# Patient Record
Sex: MALE | Race: WHITE | NOT HISPANIC OR LATINO | Employment: FULL TIME | URBAN - METROPOLITAN AREA
[De-identification: names, ages, dates, MRNs, and addresses within clinical notes are randomized per-mention and may not be internally consistent; named-entity substitution may affect disease eponyms.]

---

## 2017-06-14 ENCOUNTER — GENERIC CONVERSION - ENCOUNTER (OUTPATIENT)
Dept: OTHER | Facility: OTHER | Age: 57
End: 2017-06-14

## 2017-06-14 ENCOUNTER — ALLSCRIPTS OFFICE VISIT (OUTPATIENT)
Dept: OTHER | Facility: OTHER | Age: 57
End: 2017-06-14

## 2017-06-14 ENCOUNTER — TRANSCRIBE ORDERS (OUTPATIENT)
Dept: LAB | Facility: CLINIC | Age: 57
End: 2017-06-14

## 2017-06-14 ENCOUNTER — APPOINTMENT (OUTPATIENT)
Dept: LAB | Facility: CLINIC | Age: 57
End: 2017-06-14
Payer: COMMERCIAL

## 2017-06-14 DIAGNOSIS — I10 ESSENTIAL (PRIMARY) HYPERTENSION: ICD-10-CM

## 2017-06-14 DIAGNOSIS — R60.9 EDEMA: ICD-10-CM

## 2017-06-14 DIAGNOSIS — Z86.718 PERSONAL HISTORY OF OTHER VENOUS THROMBOSIS AND EMBOLISM: ICD-10-CM

## 2017-06-14 DIAGNOSIS — Z11.59 ENCOUNTER FOR SCREENING FOR OTHER VIRAL DISEASES: ICD-10-CM

## 2017-06-14 DIAGNOSIS — Z12.5 ENCOUNTER FOR SCREENING FOR MALIGNANT NEOPLASM OF PROSTATE: ICD-10-CM

## 2017-06-14 LAB
ALBUMIN SERPL BCP-MCNC: 3.8 G/DL (ref 3.5–5)
ALP SERPL-CCNC: 92 U/L (ref 46–116)
ALT SERPL W P-5'-P-CCNC: 54 U/L (ref 12–78)
ANION GAP SERPL CALCULATED.3IONS-SCNC: 6 MMOL/L (ref 4–13)
AST SERPL W P-5'-P-CCNC: 22 U/L (ref 5–45)
BILIRUB SERPL-MCNC: 0.55 MG/DL (ref 0.2–1)
BUN SERPL-MCNC: 15 MG/DL (ref 5–25)
CALCIUM SERPL-MCNC: 8.8 MG/DL (ref 8.3–10.1)
CHLORIDE SERPL-SCNC: 106 MMOL/L (ref 100–108)
CHOLEST SERPL-MCNC: 207 MG/DL (ref 50–200)
CO2 SERPL-SCNC: 29 MMOL/L (ref 21–32)
CREAT SERPL-MCNC: 0.78 MG/DL (ref 0.6–1.3)
ERYTHROCYTE [DISTWIDTH] IN BLOOD BY AUTOMATED COUNT: 14.2 % (ref 11.6–15.1)
GFR SERPL CREATININE-BSD FRML MDRD: >60 ML/MIN/1.73SQ M
GLUCOSE P FAST SERPL-MCNC: 93 MG/DL (ref 65–99)
HCT VFR BLD AUTO: 43.7 % (ref 36.5–49.3)
HDLC SERPL-MCNC: 44 MG/DL (ref 40–60)
HGB BLD-MCNC: 14.5 G/DL (ref 12–17)
LDLC SERPL CALC-MCNC: 136 MG/DL (ref 0–100)
MCH RBC QN AUTO: 28.3 PG (ref 26.8–34.3)
MCHC RBC AUTO-ENTMCNC: 33.2 G/DL (ref 31.4–37.4)
MCV RBC AUTO: 85 FL (ref 82–98)
PLATELET # BLD AUTO: 204 THOUSANDS/UL (ref 149–390)
PMV BLD AUTO: 10.6 FL (ref 8.9–12.7)
POTASSIUM SERPL-SCNC: 4 MMOL/L (ref 3.5–5.3)
PROT SERPL-MCNC: 7.2 G/DL (ref 6.4–8.2)
RBC # BLD AUTO: 5.12 MILLION/UL (ref 3.88–5.62)
SODIUM SERPL-SCNC: 141 MMOL/L (ref 136–145)
TRIGL SERPL-MCNC: 137 MG/DL
WBC # BLD AUTO: 7.3 THOUSAND/UL (ref 4.31–10.16)

## 2017-06-14 PROCEDURE — 80053 COMPREHEN METABOLIC PANEL: CPT

## 2017-06-14 PROCEDURE — 85027 COMPLETE CBC AUTOMATED: CPT

## 2017-06-14 PROCEDURE — 80061 LIPID PANEL: CPT

## 2017-06-14 PROCEDURE — 36415 COLL VENOUS BLD VENIPUNCTURE: CPT

## 2017-06-30 ENCOUNTER — ALLSCRIPTS OFFICE VISIT (OUTPATIENT)
Dept: OTHER | Facility: OTHER | Age: 57
End: 2017-06-30

## 2017-12-26 ENCOUNTER — APPOINTMENT (OUTPATIENT)
Dept: LAB | Facility: CLINIC | Age: 57
End: 2017-12-26
Payer: COMMERCIAL

## 2017-12-26 ENCOUNTER — TRANSCRIBE ORDERS (OUTPATIENT)
Dept: LAB | Facility: CLINIC | Age: 57
End: 2017-12-26

## 2017-12-26 ENCOUNTER — GENERIC CONVERSION - ENCOUNTER (OUTPATIENT)
Dept: OTHER | Facility: OTHER | Age: 57
End: 2017-12-26

## 2017-12-26 ENCOUNTER — ALLSCRIPTS OFFICE VISIT (OUTPATIENT)
Dept: OTHER | Facility: OTHER | Age: 57
End: 2017-12-26

## 2017-12-26 DIAGNOSIS — E78.5 HYPERLIPIDEMIA: ICD-10-CM

## 2017-12-26 DIAGNOSIS — Z12.5 ENCOUNTER FOR SCREENING FOR MALIGNANT NEOPLASM OF PROSTATE: ICD-10-CM

## 2017-12-26 DIAGNOSIS — Z11.59 ENCOUNTER FOR SCREENING FOR OTHER VIRAL DISEASES (CODE): ICD-10-CM

## 2017-12-26 DIAGNOSIS — I10 ESSENTIAL (PRIMARY) HYPERTENSION: ICD-10-CM

## 2017-12-26 LAB
ALBUMIN SERPL BCP-MCNC: 3.6 G/DL (ref 3.5–5)
ALP SERPL-CCNC: 97 U/L (ref 46–116)
ALT SERPL W P-5'-P-CCNC: 57 U/L (ref 12–78)
ANION GAP SERPL CALCULATED.3IONS-SCNC: 5 MMOL/L (ref 4–13)
AST SERPL W P-5'-P-CCNC: 22 U/L (ref 5–45)
BILIRUB SERPL-MCNC: 0.54 MG/DL (ref 0.2–1)
BUN SERPL-MCNC: 14 MG/DL (ref 5–25)
CALCIUM SERPL-MCNC: 8.9 MG/DL (ref 8.3–10.1)
CHLORIDE SERPL-SCNC: 105 MMOL/L (ref 100–108)
CO2 SERPL-SCNC: 29 MMOL/L (ref 21–32)
CREAT SERPL-MCNC: 0.74 MG/DL (ref 0.6–1.3)
CREAT UR-MCNC: 129 MG/DL
GFR SERPL CREATININE-BSD FRML MDRD: 103 ML/MIN/1.73SQ M
GLUCOSE P FAST SERPL-MCNC: 109 MG/DL (ref 65–99)
LDLC SERPL DIRECT ASSAY-MCNC: 136 MG/DL (ref 0–100)
MICROALBUMIN UR-MCNC: 8.8 MG/L (ref 0–20)
MICROALBUMIN/CREAT 24H UR: 7 MG/G CREATININE (ref 0–30)
POTASSIUM SERPL-SCNC: 4.2 MMOL/L (ref 3.5–5.3)
PROT SERPL-MCNC: 7.2 G/DL (ref 6.4–8.2)
PSA SERPL-MCNC: 3 NG/ML (ref 0–4)
SODIUM SERPL-SCNC: 139 MMOL/L (ref 136–145)
TRIGL SERPL-MCNC: 91 MG/DL
TSH SERPL DL<=0.05 MIU/L-ACNC: 1.18 UIU/ML (ref 0.36–3.74)

## 2017-12-26 PROCEDURE — 84478 ASSAY OF TRIGLYCERIDES: CPT

## 2017-12-26 PROCEDURE — 84443 ASSAY THYROID STIM HORMONE: CPT

## 2017-12-26 PROCEDURE — 82043 UR ALBUMIN QUANTITATIVE: CPT

## 2017-12-26 PROCEDURE — G0103 PSA SCREENING: HCPCS

## 2017-12-26 PROCEDURE — 80053 COMPREHEN METABOLIC PANEL: CPT

## 2017-12-26 PROCEDURE — 86803 HEPATITIS C AB TEST: CPT

## 2017-12-26 PROCEDURE — 83721 ASSAY OF BLOOD LIPOPROTEIN: CPT

## 2017-12-26 PROCEDURE — 36415 COLL VENOUS BLD VENIPUNCTURE: CPT

## 2017-12-26 PROCEDURE — 82570 ASSAY OF URINE CREATININE: CPT

## 2017-12-27 LAB — HCV AB SER QL: NORMAL

## 2018-01-10 NOTE — RESULT NOTES
Verified Results  (1) CBC/ PLT (NO DIFF) [id number] 11:43AM [facility] Order Number: [id number]     Test Name Result Flag Reference   HEMATOCRIT 43 7 %  36 5-49 3   HEMOGLOBIN 14 5 g/dL  12 0-17 0   MCHC 33 2 g/dL  31 4-37 4   MCH 28 3 pg  26 8-34 3   MCV 85 fL  82-98   PLATELET COUNT 538 Thousands/uL  149-390   RBC COUNT 5 12 Million/uL  3 88-5 62   RDW 14 2 %  11 6-15 1   WBC COUNT 7 30 Thousand/uL  4 31-10 16   MPV 10 6 fL  8 9-12 7     (1) LIPID PANEL FASTING W DIRECT LDL REFLEX [id number] 11:43AM [facility] Order Number: [id number]     Test Name Result Flag Reference   CHOLESTEROL 207 mg/dL H    LDL CHOLESTEROL CALCULATED 136 mg/dL H 0-100   Triglyceride:         Normal              <150 mg/dl       Borderline High    150-199 mg/dl       High               200-499 mg/dl       Very High          >499 mg/dl  Cholesterol:         Desirable        <200 mg/dl      Borderline High  200-239 mg/dl      High             >239 mg/dl  HDL Cholesterol:        High    >59 mg/dL      Low     <41 mg/dL  LDL Cholesterol:        Optimal          <100 mg/dl        Near Optimal     100-129 mg/dl        Above Optimal          Borderline High   130-159 mg/dl          High              160-189 mg/dl          Very High        >189 mg/dl  LDL CALCULATED:    This screening LDL is a calculated result  It does not have the accuracy of the Direct Measured LDL in the monitoring of patients with hyperlipidemia and/or statin therapy  Direct Measure LDL (CAS391) must be ordered separately in these patients  TRIGLYCERIDES 137 mg/dL  <=150   Specimen collection should occur prior to N-Acetylcysteine or Metamizole administration due to the potential for falsely depressed results  HDL,DIRECT 44 mg/dL  40-60   Specimen collection should occur prior to Metamizole administration due to the potential for falsely depressed results       (1) COMPREHENSIVE METABOLIC PANEL [id number] 15:43TA Emilee Sandy TW Order Number: [id number]     Test Name Result Flag Reference   SODIUM 141 mmol/L  136-145   POTASSIUM 4 0 mmol/L  3 5-5 3   CHLORIDE 106 mmol/L  100-108   CARBON DIOXIDE 29 mmol/L  21-32   ANION GAP (CALC) 6 mmol/L  4-13   BLOOD UREA NITROGEN 15 mg/dL  5-25   CREATININE 0 78 mg/dL  0 60-1 30   Standardized to IDMS reference method   CALCIUM 8 8 mg/dL  8 3-10 1   BILI, TOTAL 0 55 mg/dL  0 20-1 00   ALK PHOSPHATAS 92 U/L     ALT (SGPT) 54 U/L  12-78   AST(SGOT) 22 U/L  5-45   ALBUMIN 3 8 g/dL  3 5-5 0   TOTAL PROTEIN 7 2 g/dL  6 4-8 2   eGFR Non-African American      >60 0 ml/min/1 73sq m   [facility] Disease Education Program recommendations are as follows:  GFR calculation is accurate only with a steady state creatinine  Chronic Kidney disease less than 60 ml/min/1 73 sq  meters  Kidney failure less than 15 ml/min/1 73 sq  meters     GLUCOSE FASTING 93 mg/dL  65-99

## 2018-01-13 VITALS
HEIGHT: 71 IN | SYSTOLIC BLOOD PRESSURE: 130 MMHG | HEART RATE: 84 BPM | BODY MASS INDEX: 44.1 KG/M2 | TEMPERATURE: 98.2 F | WEIGHT: 315 LBS | DIASTOLIC BLOOD PRESSURE: 82 MMHG | RESPIRATION RATE: 18 BRPM

## 2018-01-14 VITALS
SYSTOLIC BLOOD PRESSURE: 160 MMHG | WEIGHT: 315 LBS | BODY MASS INDEX: 57.9 KG/M2 | DIASTOLIC BLOOD PRESSURE: 108 MMHG | HEART RATE: 88 BPM | RESPIRATION RATE: 20 BRPM | TEMPERATURE: 98.8 F

## 2018-01-15 NOTE — RESULT NOTES
Verified Results  (1) COMPREHENSIVE METABOLIC PANEL [id number] 54:39UZ Robi Gracia Order Number: [id number]      National Kidney Disease Education Program recommendations are as follows:  GFR calculation is accurate only with a steady state creatinine  Chronic Kidney disease less than 60 ml/min/1 73 sq  meters  Kidney failure less than 15 ml/min/1 73 sq  meters  Test Name Result Flag Reference   GLUCOSE,RANDM 89 mg/dL     If the patient is fasting, the ADA then defines impaired fasting glucose as > 100 mg/dL and diabetes as > or equal to 123 mg/dL     SODIUM 142 mmol/L  136-145   POTASSIUM 4 7 mmol/L  3 5-5 3   CHLORIDE 106 mmol/L  100-108   CARBON DIOXIDE 29 mmol/L  21-32   ANION GAP (CALC) 7 mmol/L  4-13   BLOOD UREA NITROGEN 16 mg/dL  5-25   CREATININE 0 74 mg/dL  0 60-1 30   Standardized to IDMS reference method   CALCIUM 8 6 mg/dL  8 3-10 1   BILI, TOTAL 0 59 mg/dL  0 20-1 00   ALK PHOSPHATAS 105 U/L     ALT (SGPT) 46 U/L  12-78   AST(SGOT) 18 U/L  5-45   ALBUMIN 3 9 g/dL  3 5-5 0   TOTAL PROTEIN 6 6 g/dL  6 4-8 2   eGFR Non-African American      >60 0 ml/min/1 73sq m

## 2018-01-24 VITALS
DIASTOLIC BLOOD PRESSURE: 100 MMHG | OXYGEN SATURATION: 97 % | BODY MASS INDEX: 44.1 KG/M2 | TEMPERATURE: 98.6 F | RESPIRATION RATE: 18 BRPM | SYSTOLIC BLOOD PRESSURE: 140 MMHG | WEIGHT: 315 LBS | HEIGHT: 71 IN | HEART RATE: 82 BPM

## 2018-02-12 ENCOUNTER — OFFICE VISIT (OUTPATIENT)
Dept: INTERNAL MEDICINE CLINIC | Facility: CLINIC | Age: 58
End: 2018-02-12
Payer: COMMERCIAL

## 2018-02-12 VITALS
BODY MASS INDEX: 44.1 KG/M2 | HEIGHT: 71 IN | OXYGEN SATURATION: 98 % | RESPIRATION RATE: 18 BRPM | TEMPERATURE: 101.8 F | HEART RATE: 82 BPM | WEIGHT: 315 LBS | DIASTOLIC BLOOD PRESSURE: 82 MMHG | SYSTOLIC BLOOD PRESSURE: 146 MMHG

## 2018-02-12 DIAGNOSIS — G47.9 SLEEP DISTURBANCE: Primary | ICD-10-CM

## 2018-02-12 DIAGNOSIS — J11.1 INFLUENZA: ICD-10-CM

## 2018-02-12 PROBLEM — E78.5 HYPERLIPEMIA: Status: ACTIVE | Noted: 2017-06-30

## 2018-02-12 PROBLEM — F41.1 GENERALIZED ANXIETY DISORDER: Status: ACTIVE | Noted: 2017-12-26

## 2018-02-12 PROCEDURE — 99213 OFFICE O/P EST LOW 20 MIN: CPT | Performed by: INTERNAL MEDICINE

## 2018-02-12 PROCEDURE — 3008F BODY MASS INDEX DOCD: CPT | Performed by: INTERNAL MEDICINE

## 2018-02-12 RX ORDER — ZOLPIDEM TARTRATE 10 MG/1
1 TABLET ORAL
COMMUNITY
Start: 2015-10-30 | End: 2018-02-12 | Stop reason: SDUPTHER

## 2018-02-12 RX ORDER — OSELTAMIVIR PHOSPHATE 75 MG/1
75 CAPSULE ORAL 2 TIMES DAILY
Qty: 10 CAPSULE | Refills: 0 | Status: SHIPPED | OUTPATIENT
Start: 2018-02-12 | End: 2018-02-17

## 2018-02-12 RX ORDER — LISINOPRIL 10 MG/1
1 TABLET ORAL DAILY
COMMUNITY
Start: 2016-04-07 | End: 2018-03-05 | Stop reason: SDUPTHER

## 2018-02-12 RX ORDER — ZOLPIDEM TARTRATE 10 MG/1
10 TABLET ORAL
Qty: 30 TABLET | Refills: 0 | Status: SHIPPED | OUTPATIENT
Start: 2018-02-12 | End: 2018-03-19 | Stop reason: SDUPTHER

## 2018-02-12 RX ORDER — ESCITALOPRAM OXALATE 20 MG/1
TABLET ORAL
Refills: 0 | COMMUNITY
Start: 2018-01-24 | End: 2018-12-31 | Stop reason: SDUPTHER

## 2018-02-12 NOTE — PATIENT INSTRUCTIONS
Influenza   AMBULATORY CARE:   Influenza  (the flu) is an infection caused by the influenza virus  The flu is easily spread when an infected person coughs, sneezes, or has close contact with others  You may be able to spread the flu to others for 1 week or longer after signs or symptoms appear  Common signs and symptoms include the following:   · Fever and chills    · Headaches, body aches, and muscle or joint pain    · Cough, runny nose, and sore throat    · Loss of appetite, nausea, vomiting, or diarrhea    · Tiredness    · Trouble breathing  Call 911 for any of the following:   · You have trouble breathing, and your lips look purple or blue  · You have a seizure  Seek care immediately if:   · You are dizzy, or you are urinating less or not at all  · You have a headache with a stiff neck, and you feel tired or confused  · You have new pain or pressure in your chest     · Your symptoms, such as shortness of breath, vomiting, or diarrhea, get worse  · Your symptoms, such as fever and coughing, seem to get better, but then get worse  Contact your healthcare provider if:   · You have new muscle pain or weakness  · You have questions or concerns about your condition or care  Treatment for influenza  may include any of the following:  · Acetaminophen  decreases pain and fever  It is available without a doctor's order  Ask how much to take and how often to take it  Follow directions  Acetaminophen can cause liver damage if not taken correctly  · NSAIDs , such as ibuprofen, help decrease swelling, pain, and fever  This medicine is available with or without a doctor's order  NSAIDs can cause stomach bleeding or kidney problems in certain people  If you take blood thinner medicine, always ask your healthcare provider if NSAIDs are safe for you  Always read the medicine label and follow directions  · Antivirals  help fight a viral infection    Manage your symptoms:   · Rest  as much as you can to help you recover  · Drink liquids as directed  to help prevent dehydration  Ask how much liquid to drink each day and which liquids are best for you  Prevent the spread of the flu:   · Wash your hands often  Use soap and water  Wash your hands after you use the bathroom, change a child's diapers, or sneeze  Wash your hands before you prepare or eat food  Use gel hand cleanser when soap and water are not available  Do not touch your eyes, nose, or mouth unless you have washed your hands first            · Cover your mouth when you sneeze or cough  Cough into a tissue or the bend of your arm  · Clean shared items with a germ-killing   Clean table surfaces, doorknobs, and light switches  Do not share towels, silverware, and dishes with people who are sick  Wash bed sheets, towels, silverware, and dishes with soap and water  · Wear a mask  over your mouth and nose if you are sick or are near anyone who is sick  · Stay away from others  if you are sick  · Influenza vaccine  helps prevent influenza (flu)  Everyone older than 6 months should get a yearly influenza vaccine  Get the vaccine as soon as it is available, usually in September or October each year  Follow up with your healthcare provider as directed:  Write down your questions so you remember to ask them during your visits  © 2017 [address] Information is for End User's use only and may not be sold, redistributed or otherwise used for commercial purposes  All illustrations and images included in CareNotes® are the copyrighted property of A D A M [employer] Inc  or Jeremy Venegas  The above information is an  only  It is not intended as medical advice for individual conditions or treatments  Talk to your doctor, nurse or pharmacist before following any medical regimen to see if it is safe and effective for you

## 2018-02-12 NOTE — PROGRESS NOTES
Assessment/Plan:    No problem-specific Assessment & Plan notes found for this encounter  Diagnoses and all orders for this visit:    Sleep disturbance  -     zolpidem (AMBIEN) 10 mg tablet; Take 1 tablet (10 mg total) by mouth daily at bedtime as needed for sleep    Influenza  -     oseltamivir (TAMIFLU) 75 mg capsule; Take 1 capsule (75 mg total) by mouth 2 (two) times a day for 5 days    Other orders  -     Lorcaserin HCl (BELVIQ) 10 MG TABS; Take 1 tablet by mouth 2 (two) times a day  -     escitalopram (LEXAPRO) 20 mg tablet;   -     lisinopril (ZESTRIL) 10 mg tablet; Take 1 tablet by mouth daily  -     Discontinue: zolpidem (AMBIEN) 10 mg tablet; Take 1 tablet by mouth      A/P: Acting more like the flu than a bacterial infection  Rest, increase PO fluids, and OTC motrin or tylenol prn  Will start tamiflu  To call if he starts to develop colored sputum and productive cough  RTC PRN  Subjective:      Patient ID: Dariela Johnson is a 65 y o  male  Non smoking WM presents with a one day h/o URI s/s  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, but works in a school  Reports acute onset of fever/chills, headaches, muscle aches, and general malaise  Mild sore throat and slight nasal congestion  No cough or chest congestion  Headache    Associated symptoms include coughing, a fever and a sore throat  Pertinent negatives include no abdominal pain, ear pain, eye redness, nausea, neck pain, rhinorrhea, sinus pressure, vomiting or weakness  Cough   Associated symptoms include chills, a fever, headaches, myalgias and a sore throat  Pertinent negatives include no chest pain, ear pain, eye redness, postnasal drip, rhinorrhea, shortness of breath or wheezing  The following portions of the patient's history were reviewed and updated as appropriate:   He  has a past medical history of Cancer ([address] ); Hypertension; and Obesity  He  does not have any pertinent problems on file    He  has a past surgical history that includes Knee surgery (Bilateral)  His family history includes Heart disease in his father  He  reports that he has never smoked  He has never used smokeless tobacco  He reports that he drinks alcohol  He reports that he does not use drugs  Current Outpatient Prescriptions   Medication Sig Dispense Refill    escitalopram (LEXAPRO) 20 mg tablet   0    lisinopril (ZESTRIL) 10 mg tablet Take 1 tablet by mouth daily      zolpidem (AMBIEN) 10 mg tablet Take 1 tablet (10 mg total) by mouth daily at bedtime as needed for sleep 30 tablet 0    Lorcaserin HCl (BELVIQ) 10 MG TABS Take 1 tablet by mouth 2 (two) times a day      oseltamivir (TAMIFLU) 75 mg capsule Take 1 capsule (75 mg total) by mouth 2 (two) times a day for 5 days 10 capsule 0     No current facility-administered medications for this visit  No current outpatient prescriptions on file prior to visit  No current facility-administered medications on file prior to visit  He is allergic to penicillin v     Review of Systems   Constitutional: Positive for activity change, appetite change, chills, fatigue and fever  Negative for diaphoresis  HENT: Positive for sore throat  Negative for congestion, ear pain, facial swelling, nosebleeds, postnasal drip, rhinorrhea, sinus pain, sinus pressure and sneezing  Eyes: Negative for redness  Respiratory: Positive for cough  Negative for chest tightness, shortness of breath and wheezing  Cardiovascular: Negative for chest pain, palpitations and leg swelling  Gastrointestinal: Negative for abdominal pain, constipation, diarrhea, nausea and vomiting  Genitourinary: Negative for difficulty urinating, dysuria and frequency  Musculoskeletal: Positive for myalgias  Negative for arthralgias, gait problem, neck pain and neck stiffness  Neurological: Positive for headaches  Negative for weakness and light-headedness  Psychiatric/Behavioral: Negative for confusion  The patient is not nervous/anxious  Objective:    Vitals:    02/12/18 1004   BP: 146/82   Pulse: 82   Resp: 18   Temp: (!) 101 8 °F (38 8 °C)   SpO2: 98%        Physical Exam   Constitutional: He is oriented to person, place, and time  He appears well-developed and well-nourished  No distress  HENT:   Head: Normocephalic and atraumatic  Right Ear: External ear normal    Left Ear: External ear normal    Mouth/Throat: Oropharynx is clear and moist  No oropharyngeal exudate  Eyes: Conjunctivae and EOM are normal  Pupils are equal, round, and reactive to light  Neck: Neck supple  Cardiovascular: Normal rate, regular rhythm and normal heart sounds  Pulmonary/Chest: Effort normal and breath sounds normal  No respiratory distress  He has no wheezes  Abdominal: Soft  Bowel sounds are normal  There is no tenderness  Musculoskeletal: He exhibits tenderness  He exhibits no edema  Lymphadenopathy:     He has no cervical adenopathy  Neurological: He is alert and oriented to person, place, and time  Skin: He is not diaphoretic  Psychiatric: He has a normal mood and affect  His behavior is normal  Judgment and thought content normal    Nursing note and vitals reviewed

## 2018-02-13 ENCOUNTER — TELEPHONE (OUTPATIENT)
Dept: INTERNAL MEDICINE CLINIC | Facility: CLINIC | Age: 58
End: 2018-02-13

## 2018-02-13 DIAGNOSIS — R30.0 DYSURIA: Primary | ICD-10-CM

## 2018-02-13 RX ORDER — LEVOFLOXACIN 500 MG/1
500 TABLET, FILM COATED ORAL EVERY 24 HOURS
Qty: 10 TABLET | Refills: 0 | Status: SHIPPED | OUTPATIENT
Start: 2018-02-13 | End: 2018-02-23

## 2018-02-13 NOTE — TELEPHONE ENCOUNTER
Pt called, saw you yesterday for flu sx"s, now thinks he has a UTI, a lot of pressure, urge, some burning, asking for a rx? p t cant come in, still now feeling well, pt uses [facility]

## 2018-03-05 ENCOUNTER — TELEPHONE (OUTPATIENT)
Dept: FAMILY MEDICINE CLINIC | Facility: CLINIC | Age: 58
End: 2018-03-05

## 2018-03-05 DIAGNOSIS — I10 HYPERTENSION, UNSPECIFIED TYPE: Primary | ICD-10-CM

## 2018-03-05 RX ORDER — LISINOPRIL 10 MG/1
10 TABLET ORAL DAILY
Qty: 30 TABLET | Refills: 5 | Status: SHIPPED | OUTPATIENT
Start: 2018-03-05 | End: 2018-09-10 | Stop reason: SDUPTHER

## 2018-03-05 NOTE — TELEPHONE ENCOUNTER
Pt needs the office to call a script into [facility] on [address] for the lisinopril 10 mg tablet 1 QD    Due to no power at the [facility]    Please advise    [phone]

## 2018-03-19 DIAGNOSIS — G47.9 SLEEP DISTURBANCE: ICD-10-CM

## 2018-03-19 RX ORDER — ZOLPIDEM TARTRATE 10 MG/1
10 TABLET ORAL
Qty: 30 TABLET | Refills: 0 | Status: SHIPPED | OUTPATIENT
Start: 2018-03-19 | End: 2018-04-19 | Stop reason: SDUPTHER

## 2018-04-19 DIAGNOSIS — G47.9 SLEEP DISTURBANCE: ICD-10-CM

## 2018-04-19 RX ORDER — ZOLPIDEM TARTRATE 10 MG/1
10 TABLET ORAL
Qty: 30 TABLET | Refills: 0 | Status: SHIPPED | OUTPATIENT
Start: 2018-04-19 | End: 2018-05-24 | Stop reason: SDUPTHER

## 2018-05-24 DIAGNOSIS — G47.9 SLEEP DISTURBANCE: ICD-10-CM

## 2018-05-24 RX ORDER — ZOLPIDEM TARTRATE 10 MG/1
10 TABLET ORAL
Qty: 30 TABLET | Refills: 0 | Status: SHIPPED | OUTPATIENT
Start: 2018-05-24 | End: 2018-06-22 | Stop reason: SDUPTHER

## 2018-06-22 DIAGNOSIS — G47.9 SLEEP DISTURBANCE: ICD-10-CM

## 2018-06-22 RX ORDER — ZOLPIDEM TARTRATE 10 MG/1
10 TABLET ORAL
Qty: 30 TABLET | Refills: 0 | Status: SHIPPED | OUTPATIENT
Start: 2018-06-22 | End: 2018-07-30 | Stop reason: SDUPTHER

## 2018-07-30 DIAGNOSIS — G47.9 SLEEP DISTURBANCE: ICD-10-CM

## 2018-07-30 RX ORDER — ZOLPIDEM TARTRATE 10 MG/1
10 TABLET ORAL
Qty: 30 TABLET | Refills: 0 | Status: SHIPPED | OUTPATIENT
Start: 2018-07-30 | End: 2018-08-31 | Stop reason: SDUPTHER

## 2018-08-31 DIAGNOSIS — G47.9 SLEEP DISTURBANCE: ICD-10-CM

## 2018-08-31 RX ORDER — ZOLPIDEM TARTRATE 10 MG/1
10 TABLET ORAL
Qty: 30 TABLET | Refills: 0 | OUTPATIENT
Start: 2018-08-31

## 2018-08-31 RX ORDER — ZOLPIDEM TARTRATE 10 MG/1
10 TABLET ORAL
Qty: 30 TABLET | Refills: 0 | Status: SHIPPED | OUTPATIENT
Start: 2018-08-31 | End: 2018-10-08 | Stop reason: SDUPTHER

## 2018-09-10 DIAGNOSIS — I10 HYPERTENSION, UNSPECIFIED TYPE: ICD-10-CM

## 2018-09-10 RX ORDER — LISINOPRIL 10 MG/1
10 TABLET ORAL DAILY
Qty: 30 TABLET | Refills: 0 | Status: SHIPPED | OUTPATIENT
Start: 2018-09-10 | End: 2018-10-08 | Stop reason: SDUPTHER

## 2018-10-08 ENCOUNTER — OFFICE VISIT (OUTPATIENT)
Dept: INTERNAL MEDICINE CLINIC | Facility: CLINIC | Age: 58
End: 2018-10-08
Payer: COMMERCIAL

## 2018-10-08 VITALS
BODY MASS INDEX: 44.1 KG/M2 | TEMPERATURE: 99.3 F | OXYGEN SATURATION: 94 % | HEART RATE: 83 BPM | SYSTOLIC BLOOD PRESSURE: 138 MMHG | DIASTOLIC BLOOD PRESSURE: 80 MMHG | RESPIRATION RATE: 18 BRPM | HEIGHT: 71 IN | WEIGHT: 315 LBS

## 2018-10-08 DIAGNOSIS — L81.7 SCHAMBERG'S CAPILLARITIS: ICD-10-CM

## 2018-10-08 DIAGNOSIS — E66.01 CLASS 3 SEVERE OBESITY DUE TO EXCESS CALORIES WITHOUT SERIOUS COMORBIDITY WITH BODY MASS INDEX (BMI) OF 50.0 TO 59.9 IN ADULT (HCC): ICD-10-CM

## 2018-10-08 DIAGNOSIS — Z12.11 SCREEN FOR COLON CANCER: ICD-10-CM

## 2018-10-08 DIAGNOSIS — C43.9 MALIGNANT MELANOMA OF SKIN (HCC): ICD-10-CM

## 2018-10-08 DIAGNOSIS — Z23 ENCOUNTER FOR VACCINATION: ICD-10-CM

## 2018-10-08 DIAGNOSIS — Z13.1 SCREENING FOR DIABETES MELLITUS (DM): ICD-10-CM

## 2018-10-08 DIAGNOSIS — I10 BENIGN ESSENTIAL HYPERTENSION: Primary | ICD-10-CM

## 2018-10-08 DIAGNOSIS — E78.2 MIXED HYPERLIPIDEMIA: ICD-10-CM

## 2018-10-08 DIAGNOSIS — F41.1 GENERALIZED ANXIETY DISORDER: ICD-10-CM

## 2018-10-08 DIAGNOSIS — I10 HYPERTENSION, UNSPECIFIED TYPE: ICD-10-CM

## 2018-10-08 DIAGNOSIS — G47.33 OBSTRUCTIVE SLEEP APNEA: ICD-10-CM

## 2018-10-08 DIAGNOSIS — G47.9 SLEEP DISTURBANCE: ICD-10-CM

## 2018-10-08 DIAGNOSIS — J30.1 SEASONAL ALLERGIC RHINITIS DUE TO POLLEN: ICD-10-CM

## 2018-10-08 PROCEDURE — 1036F TOBACCO NON-USER: CPT | Performed by: INTERNAL MEDICINE

## 2018-10-08 PROCEDURE — 3075F SYST BP GE 130 - 139MM HG: CPT | Performed by: INTERNAL MEDICINE

## 2018-10-08 PROCEDURE — 3079F DIAST BP 80-89 MM HG: CPT | Performed by: INTERNAL MEDICINE

## 2018-10-08 PROCEDURE — 90471 IMMUNIZATION ADMIN: CPT | Performed by: INTERNAL MEDICINE

## 2018-10-08 PROCEDURE — 90682 RIV4 VACC RECOMBINANT DNA IM: CPT

## 2018-10-08 PROCEDURE — 99214 OFFICE O/P EST MOD 30 MIN: CPT | Performed by: INTERNAL MEDICINE

## 2018-10-08 RX ORDER — LISINOPRIL 10 MG/1
10 TABLET ORAL DAILY
Qty: 30 TABLET | Refills: 5 | Status: SHIPPED | OUTPATIENT
Start: 2018-10-08 | End: 2019-04-10 | Stop reason: SDUPTHER

## 2018-10-08 RX ORDER — ZOLPIDEM TARTRATE 10 MG/1
10 TABLET ORAL
Qty: 30 TABLET | Refills: 0 | Status: SHIPPED | OUTPATIENT
Start: 2018-10-08 | End: 2018-11-07 | Stop reason: SDUPTHER

## 2018-10-08 NOTE — PROGRESS NOTES
Assessment/Plan:    No problem-specific Assessment & Plan notes found for this encounter  Diagnoses and all orders for this visit:    Benign essential hypertension  -     CBC and differential; Future  -     Comprehensive metabolic panel; Future    Mixed hyperlipidemia  -     Lipid Panel with Direct LDL reflex; Future    Generalized anxiety disorder    Malignant melanoma of skin ([facility] [address] )    Obstructive sleep apnea    Seasonal allergic rhinitis due to pollen    Screening for diabetes mellitus (DM)  -     Hemoglobin A1C; Future    Screen for colon cancer    Sleep disturbance  -     zolpidem (AMBIEN) 10 mg tablet; Take 1 tablet (10 mg total) by mouth daily at bedtime as needed for sleep    Hypertension, unspecified type  -     lisinopril (ZESTRIL) 10 mg tablet; Take 1 tablet (10 mg total) by mouth daily    Encounter for vaccination  -     influenza vaccine, 1841-4763, quadrivalent, recombinant, PF, 0 5 mL, for patients 18 yr+ (FLUBLOK)    Class 3 severe obesity due to excess calories without serious comorbidity with body mass index (BMI) of 50 0 to 59 9 in adult [facility])    Schamberg's capillaritis      A/P: Doing well and will check labs  Discussed the weight gain and bariatric considerations and wants to defer referral at this time  Update flu vaccine and has a FIT test at home  Continue current treatment  RTC four months for routine  Subjective:      Patient ID: Sury Springer is a 63 y o  male  WM RTC for f/u htn, hyperlipidemia, etc  Doing well and no new issues  Remains active w/o difficutly and no falls  BRITTANI and DJD s/s are controlled  Allergies starting to flare  No new suspicious skin lesions with Melanoma in remission after just seeing derm  ELIDIA stable and using CPAP nightly  Due for labs, vaccines, and CRC  The following portions of the patient's history were reviewed and updated as appropriate:   He  has a past medical history of Arthritis; 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[facility] [address] );  Cancer ([facility] [address] ); Generalized anxiety disorder; Hypertension; Obesity; and Primary osteoarthritis of both knees  He   Patient Active Problem List    Diagnosis Date Noted    Schamberg's capillaritis 10/08/2018    Generalized anxiety disorder 12/26/2017    Hyperlipemia 06/30/2017    Allergic rhinitis due to pollen 04/07/2016    Benign essential hypertension 23/20/4002    Umbilical hernia 66/60/9250    First degree AV block 10/30/2015    Malignant melanoma of skin ([facility] [address] ) 10/30/2015    Obesity 10/30/2015    Obstructive sleep apnea 10/30/2015    Primary osteoarthritis of both knees 10/30/2015    Sleep disturbances 10/30/2015     He  has a past surgical history that includes Knee surgery (Bilateral, 2006, 2008); Knee arthroscopy; Mohs surgery; Tonsillectomy and adenoidectomy; and Uvulectomy  His family history includes Arthritis in his maternal grandmother; Cancer in his mother; Heart disease in his father, maternal grandmother, other, and paternal grandmother; Hypotension in his mother; Liver cancer in his other and paternal grandfather; Lung cancer in his other; Other in his brother; Prostate cancer in his family and other; Stroke in his maternal grandfather, maternal grandmother, and mother; Thyroid cancer in his mother  He  reports that he has never smoked  He has never used smokeless tobacco  He reports that he drinks alcohol  He reports that he does not use drugs  Current Outpatient Prescriptions   Medication Sig Dispense Refill    escitalopram (LEXAPRO) 20 mg tablet   0    lisinopril (ZESTRIL) 10 mg tablet Take 1 tablet (10 mg total) by mouth daily 30 tablet 5    zolpidem (AMBIEN) 10 mg tablet Take 1 tablet (10 mg total) by mouth daily at bedtime as needed for sleep 30 tablet 0     No current facility-administered medications for this visit        Current Outpatient Prescriptions on File Prior to Visit   Medication Sig    escitalopram (LEXAPRO) 20 mg tablet     [DISCONTINUED] lisinopril (ZESTRIL) 10 mg tablet Take 1 tablet (10 mg total) by mouth daily    [DISCONTINUED] zolpidem (AMBIEN) 10 mg tablet Take 1 tablet (10 mg total) by mouth daily at bedtime as needed for sleep    [DISCONTINUED] Lorcaserin HCl (BELVIQ) 10 MG TABS Take 1 tablet by mouth 2 (two) times a day     No current facility-administered medications on file prior to visit  He is allergic to penicillin v     Review of Systems   Constitutional: Negative for activity change, chills, diaphoresis, fatigue and fever  HENT: Negative  Eyes: Negative for visual disturbance  Respiratory: Negative for cough, chest tightness, shortness of breath and wheezing  Cardiovascular: Negative for chest pain, palpitations and leg swelling  Gastrointestinal: Negative for abdominal pain, constipation, diarrhea, nausea and vomiting  Endocrine: Negative for cold intolerance and heat intolerance  Genitourinary: Negative for difficulty urinating, dysuria and frequency  Musculoskeletal: Negative for arthralgias, gait problem and myalgias  Allergic/Immunologic: Positive for environmental allergies  Neurological: Negative for dizziness, tremors, seizures, weakness, light-headedness and headaches  Psychiatric/Behavioral: Negative for confusion and dysphoric mood  The patient is not nervous/anxious  Objective:      /80 (BP Location: Left arm, Patient Position: Sitting, Cuff Size: Large)   Pulse 83   Temp 99 3 °F (37 4 °C) (Tympanic)   Resp 18   Ht 5' 11" (1 803 m)   Wt (!) 191 kg (422 lb)   SpO2 94%   BMI 58 86 kg/m²          Physical Exam   Constitutional: He is oriented to person, place, and time  He appears well-developed and well-nourished  No distress  HENT:   Head: Normocephalic and atraumatic  Mouth/Throat: Oropharynx is clear and moist    Eyes: Pupils are equal, round, and reactive to light  Conjunctivae and EOM are normal    Neck: Neck supple  No JVD present     Cardiovascular: Normal rate, regular rhythm and normal heart sounds  No murmur heard  Pulmonary/Chest: Breath sounds normal  No respiratory distress  He has no wheezes  Abdominal: Soft  Bowel sounds are normal  He exhibits no distension  There is no tenderness  Musculoskeletal: He exhibits no edema  Neurological: He is alert and oriented to person, place, and time  Psychiatric: He has a normal mood and affect  His behavior is normal  Judgment and thought content normal    Nursing note and vitals reviewed

## 2018-10-08 NOTE — PATIENT INSTRUCTIONS

## 2018-11-07 DIAGNOSIS — G47.9 SLEEP DISTURBANCE: ICD-10-CM

## 2018-11-07 RX ORDER — ZOLPIDEM TARTRATE 10 MG/1
10 TABLET ORAL
Qty: 30 TABLET | Refills: 0 | Status: SHIPPED | OUTPATIENT
Start: 2018-11-07 | End: 2018-12-06 | Stop reason: SDUPTHER

## 2018-12-06 DIAGNOSIS — G47.9 SLEEP DISTURBANCE: ICD-10-CM

## 2018-12-07 RX ORDER — ZOLPIDEM TARTRATE 10 MG/1
10 TABLET ORAL
Qty: 30 TABLET | Refills: 0 | Status: SHIPPED | OUTPATIENT
Start: 2018-12-07 | End: 2019-01-08 | Stop reason: SDUPTHER

## 2018-12-31 DIAGNOSIS — F41.1 GENERALIZED ANXIETY DISORDER: Primary | ICD-10-CM

## 2018-12-31 RX ORDER — ESCITALOPRAM OXALATE 20 MG/1
20 TABLET ORAL DAILY
Qty: 30 TABLET | Refills: 5 | Status: SHIPPED | OUTPATIENT
Start: 2018-12-31 | End: 2019-07-25 | Stop reason: SDUPTHER

## 2019-01-08 DIAGNOSIS — G47.9 SLEEP DISTURBANCE: ICD-10-CM

## 2019-01-08 RX ORDER — ZOLPIDEM TARTRATE 10 MG/1
10 TABLET ORAL
Qty: 30 TABLET | Refills: 0 | Status: SHIPPED | OUTPATIENT
Start: 2019-01-08 | End: 2019-02-15 | Stop reason: SDUPTHER

## 2019-02-15 DIAGNOSIS — G47.9 SLEEP DISTURBANCE: ICD-10-CM

## 2019-02-16 RX ORDER — ZOLPIDEM TARTRATE 10 MG/1
10 TABLET ORAL
Qty: 30 TABLET | Refills: 0 | Status: SHIPPED | OUTPATIENT
Start: 2019-02-16 | End: 2019-03-20 | Stop reason: SDUPTHER

## 2019-03-20 DIAGNOSIS — G47.9 SLEEP DISTURBANCE: ICD-10-CM

## 2019-03-20 RX ORDER — ZOLPIDEM TARTRATE 10 MG/1
10 TABLET ORAL
Qty: 30 TABLET | Refills: 0 | Status: SHIPPED | OUTPATIENT
Start: 2019-03-20 | End: 2019-04-22 | Stop reason: SDUPTHER

## 2019-04-10 DIAGNOSIS — I10 HYPERTENSION, UNSPECIFIED TYPE: ICD-10-CM

## 2019-04-10 RX ORDER — LISINOPRIL 10 MG/1
10 TABLET ORAL DAILY
Qty: 30 TABLET | Refills: 5 | Status: SHIPPED | OUTPATIENT
Start: 2019-04-10 | End: 2019-10-15 | Stop reason: SDUPTHER

## 2019-04-22 DIAGNOSIS — G47.9 SLEEP DISTURBANCE: ICD-10-CM

## 2019-04-22 RX ORDER — ZOLPIDEM TARTRATE 10 MG/1
10 TABLET ORAL
Qty: 30 TABLET | Refills: 0 | Status: SHIPPED | OUTPATIENT
Start: 2019-04-22 | End: 2019-05-24 | Stop reason: SDUPTHER

## 2019-05-24 DIAGNOSIS — G47.9 SLEEP DISTURBANCE: ICD-10-CM

## 2019-05-24 RX ORDER — ZOLPIDEM TARTRATE 10 MG/1
10 TABLET ORAL
Qty: 30 TABLET | Refills: 0 | Status: SHIPPED | OUTPATIENT
Start: 2019-05-24 | End: 2019-06-25 | Stop reason: SDUPTHER

## 2019-06-25 DIAGNOSIS — G47.9 SLEEP DISTURBANCE: ICD-10-CM

## 2019-06-25 RX ORDER — ZOLPIDEM TARTRATE 10 MG/1
10 TABLET ORAL
Qty: 30 TABLET | Refills: 0 | Status: SHIPPED | OUTPATIENT
Start: 2019-06-25 | End: 2019-07-25 | Stop reason: SDUPTHER

## 2019-07-25 ENCOUNTER — TELEPHONE (OUTPATIENT)
Dept: INTERNAL MEDICINE CLINIC | Facility: CLINIC | Age: 59
End: 2019-07-25

## 2019-07-25 DIAGNOSIS — F41.1 GENERALIZED ANXIETY DISORDER: ICD-10-CM

## 2019-07-25 DIAGNOSIS — G47.9 SLEEP DISTURBANCE: ICD-10-CM

## 2019-07-25 RX ORDER — ZOLPIDEM TARTRATE 10 MG/1
10 TABLET ORAL
Qty: 30 TABLET | Refills: 0 | Status: SHIPPED | OUTPATIENT
Start: 2019-07-25 | End: 2019-08-23 | Stop reason: SDUPTHER

## 2019-07-25 RX ORDER — ESCITALOPRAM OXALATE 20 MG/1
20 TABLET ORAL DAILY
Qty: 30 TABLET | Refills: 5 | Status: SHIPPED | OUTPATIENT
Start: 2019-07-25 | End: 2020-01-27 | Stop reason: SDUPTHER

## 2019-08-23 DIAGNOSIS — G47.9 SLEEP DISTURBANCE: ICD-10-CM

## 2019-08-23 RX ORDER — ZOLPIDEM TARTRATE 10 MG/1
10 TABLET ORAL
Qty: 30 TABLET | Refills: 0 | Status: SHIPPED | OUTPATIENT
Start: 2019-08-23 | End: 2019-09-25 | Stop reason: SDUPTHER

## 2019-09-12 ENCOUNTER — APPOINTMENT (OUTPATIENT)
Dept: LAB | Facility: CLINIC | Age: 59
End: 2019-09-12
Payer: COMMERCIAL

## 2019-09-12 ENCOUNTER — TRANSCRIBE ORDERS (OUTPATIENT)
Dept: LAB | Facility: CLINIC | Age: 59
End: 2019-09-12

## 2019-09-12 DIAGNOSIS — Z13.1 SCREENING FOR DIABETES MELLITUS (DM): ICD-10-CM

## 2019-09-12 DIAGNOSIS — Z99.89 OSA ON CPAP: ICD-10-CM

## 2019-09-12 DIAGNOSIS — E66.01 MORBID (SEVERE) OBESITY DUE TO EXCESS CALORIES (HCC): Primary | ICD-10-CM

## 2019-09-12 DIAGNOSIS — G47.33 OSA ON CPAP: ICD-10-CM

## 2019-09-12 DIAGNOSIS — I10 BENIGN ESSENTIAL HYPERTENSION: ICD-10-CM

## 2019-09-12 DIAGNOSIS — E78.2 MIXED HYPERLIPIDEMIA: ICD-10-CM

## 2019-09-12 DIAGNOSIS — E66.01 MORBID (SEVERE) OBESITY DUE TO EXCESS CALORIES (HCC): ICD-10-CM

## 2019-09-12 DIAGNOSIS — I10 ESSENTIAL HYPERTENSION: ICD-10-CM

## 2019-09-12 LAB
25(OH)D3 SERPL-MCNC: 26.4 NG/ML (ref 30–100)
ALBUMIN SERPL BCP-MCNC: 3.8 G/DL (ref 3.5–5)
ALP SERPL-CCNC: 110 U/L (ref 46–116)
ALT SERPL W P-5'-P-CCNC: 55 U/L (ref 12–78)
ANION GAP SERPL CALCULATED.3IONS-SCNC: 4 MMOL/L (ref 4–13)
AST SERPL W P-5'-P-CCNC: 21 U/L (ref 5–45)
BASOPHILS # BLD AUTO: 0.05 THOUSANDS/ΜL (ref 0–0.1)
BASOPHILS NFR BLD AUTO: 1 % (ref 0–1)
BILIRUB SERPL-MCNC: 0.5 MG/DL (ref 0.2–1)
BUN SERPL-MCNC: 17 MG/DL (ref 5–25)
CALCIUM SERPL-MCNC: 9.2 MG/DL (ref 8.3–10.1)
CHLORIDE SERPL-SCNC: 105 MMOL/L (ref 100–108)
CHOLEST SERPL-MCNC: 184 MG/DL (ref 50–200)
CO2 SERPL-SCNC: 28 MMOL/L (ref 21–32)
CREAT SERPL-MCNC: 0.87 MG/DL (ref 0.6–1.3)
EOSINOPHIL # BLD AUTO: 0.12 THOUSAND/ΜL (ref 0–0.61)
EOSINOPHIL NFR BLD AUTO: 1 % (ref 0–6)
ERYTHROCYTE [DISTWIDTH] IN BLOOD BY AUTOMATED COUNT: 14.5 % (ref 11.6–15.1)
EST. AVERAGE GLUCOSE BLD GHB EST-MCNC: 123 MG/DL
GFR SERPL CREATININE-BSD FRML MDRD: 96 ML/MIN/1.73SQ M
GLUCOSE P FAST SERPL-MCNC: 94 MG/DL (ref 65–99)
HBA1C MFR BLD: 5.9 % (ref 4.2–6.3)
HCT VFR BLD AUTO: 48.3 % (ref 36.5–49.3)
HDLC SERPL-MCNC: 42 MG/DL (ref 40–60)
HGB BLD-MCNC: 15.5 G/DL (ref 12–17)
IMM GRANULOCYTES # BLD AUTO: 0.02 THOUSAND/UL (ref 0–0.2)
IMM GRANULOCYTES NFR BLD AUTO: 0 % (ref 0–2)
LDLC SERPL CALC-MCNC: 120 MG/DL (ref 0–100)
LYMPHOCYTES # BLD AUTO: 2.13 THOUSANDS/ΜL (ref 0.6–4.47)
LYMPHOCYTES NFR BLD AUTO: 22 % (ref 14–44)
MCH RBC QN AUTO: 27.6 PG (ref 26.8–34.3)
MCHC RBC AUTO-ENTMCNC: 32.1 G/DL (ref 31.4–37.4)
MCV RBC AUTO: 86 FL (ref 82–98)
MONOCYTES # BLD AUTO: 0.79 THOUSAND/ΜL (ref 0.17–1.22)
MONOCYTES NFR BLD AUTO: 8 % (ref 4–12)
NEUTROPHILS # BLD AUTO: 6.71 THOUSANDS/ΜL (ref 1.85–7.62)
NEUTS SEG NFR BLD AUTO: 68 % (ref 43–75)
NRBC BLD AUTO-RTO: 0 /100 WBCS
PLATELET # BLD AUTO: 207 THOUSANDS/UL (ref 149–390)
PMV BLD AUTO: 10.7 FL (ref 8.9–12.7)
POTASSIUM SERPL-SCNC: 4.2 MMOL/L (ref 3.5–5.3)
PROT SERPL-MCNC: 7.5 G/DL (ref 6.4–8.2)
RBC # BLD AUTO: 5.61 MILLION/UL (ref 3.88–5.62)
SODIUM SERPL-SCNC: 137 MMOL/L (ref 136–145)
TRIGL SERPL-MCNC: 110 MG/DL
TSH SERPL DL<=0.05 MIU/L-ACNC: 1.67 UIU/ML (ref 0.36–3.74)
VIT B12 SERPL-MCNC: 664 PG/ML (ref 100–900)
WBC # BLD AUTO: 9.82 THOUSAND/UL (ref 4.31–10.16)

## 2019-09-12 PROCEDURE — 82607 VITAMIN B-12: CPT

## 2019-09-12 PROCEDURE — 80061 LIPID PANEL: CPT

## 2019-09-12 PROCEDURE — 82306 VITAMIN D 25 HYDROXY: CPT

## 2019-09-12 PROCEDURE — 80053 COMPREHEN METABOLIC PANEL: CPT

## 2019-09-12 PROCEDURE — 84443 ASSAY THYROID STIM HORMONE: CPT

## 2019-09-12 PROCEDURE — 36415 COLL VENOUS BLD VENIPUNCTURE: CPT

## 2019-09-12 PROCEDURE — 83036 HEMOGLOBIN GLYCOSYLATED A1C: CPT

## 2019-09-12 PROCEDURE — 85025 COMPLETE CBC W/AUTO DIFF WBC: CPT

## 2019-09-25 DIAGNOSIS — G47.9 SLEEP DISTURBANCE: ICD-10-CM

## 2019-09-25 RX ORDER — ZOLPIDEM TARTRATE 10 MG/1
10 TABLET ORAL
Qty: 30 TABLET | Refills: 0 | Status: SHIPPED | OUTPATIENT
Start: 2019-09-25 | End: 2019-10-24 | Stop reason: SDUPTHER

## 2019-09-25 NOTE — TELEPHONE ENCOUNTER
Scheduled Medication Review:  Pt's scheduled medication use was reviewed by myself/staff via the [employer] website  Pt's use has been found to be appropriate w/o any concerns for misuse by the patient  Pt's current conditions require continued scheduled medication use at this time  Future review for continued appropriate medication use and misuse will continue

## 2019-10-01 ENCOUNTER — OFFICE VISIT (OUTPATIENT)
Dept: INTERNAL MEDICINE CLINIC | Facility: CLINIC | Age: 59
End: 2019-10-01
Payer: COMMERCIAL

## 2019-10-01 VITALS
OXYGEN SATURATION: 97 % | TEMPERATURE: 100.1 F | DIASTOLIC BLOOD PRESSURE: 82 MMHG | WEIGHT: 315 LBS | HEART RATE: 68 BPM | SYSTOLIC BLOOD PRESSURE: 136 MMHG | RESPIRATION RATE: 18 BRPM | BODY MASS INDEX: 44.1 KG/M2 | HEIGHT: 71 IN

## 2019-10-01 DIAGNOSIS — E78.2 MIXED HYPERLIPIDEMIA: ICD-10-CM

## 2019-10-01 DIAGNOSIS — Z23 NEED FOR INFLUENZA VACCINATION: ICD-10-CM

## 2019-10-01 DIAGNOSIS — Z23 ENCOUNTER FOR VACCINATION: ICD-10-CM

## 2019-10-01 DIAGNOSIS — I10 HYPERTENSION, UNSPECIFIED TYPE: ICD-10-CM

## 2019-10-01 DIAGNOSIS — G47.33 OBSTRUCTIVE SLEEP APNEA: ICD-10-CM

## 2019-10-01 DIAGNOSIS — Z12.11 SCREEN FOR COLON CANCER: ICD-10-CM

## 2019-10-01 DIAGNOSIS — I10 ESSENTIAL HYPERTENSION: Primary | ICD-10-CM

## 2019-10-01 DIAGNOSIS — Z12.11 SCREENING FOR COLON CANCER: ICD-10-CM

## 2019-10-01 DIAGNOSIS — E66.01 MORBID OBESITY WITH BMI OF 50.0-59.9, ADULT (HCC): ICD-10-CM

## 2019-10-01 DIAGNOSIS — E55.9 VITAMIN D DEFICIENCY: ICD-10-CM

## 2019-10-01 DIAGNOSIS — Z13.31 NEGATIVE DEPRESSION SCREENING: ICD-10-CM

## 2019-10-01 DIAGNOSIS — C43.9 MALIGNANT MELANOMA OF SKIN (HCC): ICD-10-CM

## 2019-10-01 DIAGNOSIS — F41.1 GENERALIZED ANXIETY DISORDER: ICD-10-CM

## 2019-10-01 PROCEDURE — 90471 IMMUNIZATION ADMIN: CPT | Performed by: INTERNAL MEDICINE

## 2019-10-01 PROCEDURE — 90682 RIV4 VACC RECOMBINANT DNA IM: CPT | Performed by: INTERNAL MEDICINE

## 2019-10-01 PROCEDURE — 3008F BODY MASS INDEX DOCD: CPT | Performed by: INTERNAL MEDICINE

## 2019-10-01 PROCEDURE — 3079F DIAST BP 80-89 MM HG: CPT | Performed by: INTERNAL MEDICINE

## 2019-10-01 PROCEDURE — 3075F SYST BP GE 130 - 139MM HG: CPT | Performed by: INTERNAL MEDICINE

## 2019-10-01 PROCEDURE — 99214 OFFICE O/P EST MOD 30 MIN: CPT | Performed by: INTERNAL MEDICINE

## 2019-10-01 NOTE — PROGRESS NOTES
Assessment/Plan:  Problem List Items Addressed This Visit        Respiratory    Obstructive sleep apnea       Cardiovascular and Mediastinum    Essential hypertension - Primary       Musculoskeletal and Integument    Malignant melanoma of skin ([facility] )       Other    Generalized anxiety disorder    Hyperlipemia    Vitamin D deficiency      Other Visit Diagnoses     Screening for colon cancer        Relevant Orders    Ambulatory referral to Gastroenterology    Negative depression screening        Screen for colon cancer        Morbid obesity with BMI of 50 0-59 9, adult ([facility] )        Hypertension, unspecified type        Need for influenza vaccination        Relevant Orders    influenza vaccine, [id number], quadrivalent, recombinant, PF, 0 5 mL, for patients 18 yr+ ([facility]) (Completed)    Encounter for vaccination               Diagnoses and all orders for this visit:    Essential hypertension    Screening for colon cancer  -     Ambulatory referral to Gastroenterology    Mixed hyperlipidemia    Generalized anxiety disorder    Vitamin D deficiency    Malignant melanoma of skin ([facility] )    Obstructive sleep apnea    Negative depression screening    Screen for colon cancer    Morbid obesity with BMI of 50 0-59 9, adult ([place] [address] )    Hypertension, unspecified type    Need for influenza vaccination  -     influenza vaccine, [id number], quadrivalent, recombinant, PF, 0 5 mL, for patients 18 yr+ ([facility])    Encounter for vaccination    Other orders  -     Cancel: Occult Blood, Fecal Immunochemical        No problem-specific Assessment & Plan notes found for this encounter  A/P: Doing well and labs are up to date  Refer for colonoscopy  Discussed BMI and will give information on diet and exercise  Pt is to meet with Bariatrics in the next few weeks for possible gastric sleeve  Continue current treatment and RTC four months for routine  Will up date his flu vaccine  Pt to start otc vit d replacement      Subjective:      Patient ID: Ana Lilia Dawn is a 63 y o  male  WM RTC for f/u htn, hyperlipidemia, etc  Doing well and no new issues  Remains active w/o difficulty and no falls  ELIDIA is stable and using CPAP  DJD pain in no worse  Skin cancer is in remission  BRITTANI is controlled  Seen at [facility] bariatrics and is in the process of being evaled for gastric sleeve  Due for vaccines  Labs are up to date and acceptable except for a low vit d  The following portions of the patient's history were reviewed and updated as appropriate:   He has a past medical history of Arthritis, 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[facility] ), Cancer ([facility] ), Generalized anxiety disorder, Hypertension, Obesity, and Primary osteoarthritis of both knees  ,  does not have any pertinent problems on file  ,   has a past surgical history that includes Knee surgery (Bilateral, 2006, 2008); Knee arthroscopy; Mohs surgery; Tonsillectomy and adenoidectomy; and Uvulectomy  ,  family history includes Arthritis in his maternal grandmother; Cancer in his mother; Heart disease in his father, maternal grandmother, other, and paternal grandmother; Hypotension in his mother; Liver cancer in his other and paternal grandfather; Lung cancer in his other; Other in his brother; Prostate cancer in his family and other; Stroke in his maternal grandfather, maternal grandmother, and mother; Thyroid cancer in his mother  ,   reports that he has never smoked  He has never used smokeless tobacco  He reports that he drinks alcohol  He reports that he does not use drugs  ,  is allergic to penicillin v   Current Outpatient Medications   Medication Sig Dispense Refill    escitalopram (LEXAPRO) 20 mg tablet Take 1 tablet (20 mg total) by mouth daily 30 tablet 5    lisinopril (ZESTRIL) 10 mg tablet Take 1 tablet (10 mg total) by mouth daily 30 tablet 5    zolpidem (AMBIEN) 10 mg tablet Take 1 tablet (10 mg total) by mouth daily at bedtime as needed for sleep 30 tablet 0     No current facility-administered medications for this visit  Review of Systems   Constitutional: Negative for activity change, chills, diaphoresis, fatigue and fever  HENT: Negative  Eyes: Negative for visual disturbance  Respiratory: Negative for cough, chest tightness, shortness of breath and wheezing  Cardiovascular: Negative for chest pain, palpitations and leg swelling  Gastrointestinal: Negative for abdominal pain, constipation, diarrhea, nausea and vomiting  Endocrine: Negative for cold intolerance and heat intolerance  Genitourinary: Negative for difficulty urinating, dysuria and frequency  Musculoskeletal: Negative for arthralgias, gait problem and myalgias  Neurological: Negative for dizziness, seizures, syncope, weakness, light-headedness and headaches  Psychiatric/Behavioral: Negative for confusion and dysphoric mood  The patient is not nervous/anxious  PHQ-9 Depression Screening    PHQ-9:    Frequency of the following problems over the past two weeks:       Little interest or pleasure in doing things:  0 - not at all  Feeling down, depressed, or hopeless:  0 - not at all  PHQ-2 Score:  0        Objective:  Vitals:    10/01/19 1517   BP: 136/82   BP Location: Left arm   Patient Position: Sitting   Cuff Size: Extra-Large   Pulse: 68   Resp: 18   Temp: 100 1 °F (37 8 °C)   SpO2: 97%   Weight: (!) 183 kg (404 lb 3 2 oz)   Height: 5' 11" (1 803 m)     Body mass index is 56 37 kg/m²  Physical Exam   Constitutional: He is oriented to person, place, and time  He appears well-developed and well-nourished  No distress  HENT:   Head: Normocephalic and atraumatic  Mouth/Throat: Oropharynx is clear and moist    Eyes: Pupils are equal, round, and reactive to light  Conjunctivae and EOM are normal    Neck: Neck supple  No JVD present  Cardiovascular: Normal rate, regular rhythm and normal heart sounds  Pulmonary/Chest: Effort normal and breath sounds normal  No respiratory distress  He has no wheezes   He has no rales    Abdominal: Soft  Bowel sounds are normal  He exhibits no distension  There is no tenderness  Musculoskeletal: He exhibits no edema  Neurological: He is alert and oriented to person, place, and time  Psychiatric: He has a normal mood and affect  His behavior is normal  Judgment and thought content normal    Nursing note and vitals reviewed  BMI Counseling: Body mass index is 56 37 kg/m²  The BMI is above normal  Nutrition recommendations include reducing portion sizes, decreasing overall calorie intake, reducing intake of saturated fat and trans fat and reducing intake of cholesterol  Exercise recommendations include moderate aerobic physical activity for 150 minutes/week

## 2019-10-01 NOTE — PATIENT INSTRUCTIONS
Chronic Hypertension   AMBULATORY CARE:   Hypertension  is high blood pressure (BP)  Your BP is the force of your blood moving against the walls of your arteries  Normal BP is less than 120/80  Prehypertension is between 120/80 and 139/89  Hypertension is 140/90 or higher  Hypertension causes your BP to get so high that your heart has to work much harder than normal  This can damage your heart  Chronic hypertension is a long-term condition that you can control with a healthy lifestyle or medicines  A controlled blood pressure helps protect your organs, such as your heart, lungs, brain, and kidneys  Common symptoms include the following:   · Headache     · Blurred vision    · Chest pain     · Dizziness or weakness     · Trouble breathing     · Nosebleeds  Call 911 for any of the following:   · You have discomfort in your chest that feels like squeezing, pressure, fullness, or pain  · You become confused or have difficulty speaking  · You suddenly feel lightheaded or have trouble breathing  · You have pain or discomfort in your back, neck, jaw, stomach, or arm  Seek care immediately if:   · You have a severe headache or vision loss  · You have weakness in an arm or leg  Contact your healthcare provider if:   · You feel faint, dizzy, confused, or drowsy  · You have been taking your BP medicine and your BP is still higher than your healthcare provider says it should be  · You have questions or concerns about your condition or care  Treatment for chronic hypertension  may include medicine to lower your BP and lower your cholesterol level  A low cholesterol level helps prevent heart disease and makes it easier to control your blood pressure  Heart disease can make your blood pressure harder to control  You may also need to make lifestyle changes  Take your medicine exactly as directed    Manage chronic hypertension:  Talk with your healthcare provider about these and other ways to manage hypertension:  · Take your BP at home  Sit and rest for 5 minutes before you take your BP  Extend your arm and support it on a flat surface  Your arm should be at the same level as your heart  Follow the directions that came with your BP monitor  If possible, take at least 2 BP readings each time  Take your BP at least twice a day at the same times each day, such as morning and evening  Keep a record of your BP readings and bring it to your follow-up visits  Ask your healthcare provider what your blood pressure should be  · Limit sodium (salt) as directed  Too much sodium can affect your fluid balance  Check labels to find low-sodium or no-salt-added foods  Some low-sodium foods use potassium salts for flavor  Too much potassium can also cause health problems  Your healthcare provider will tell you how much sodium and potassium are safe for you to have in a day  He or she may recommend that you limit sodium to 2,300 mg a day  · Follow the meal plan recommended by your healthcare provider  A dietitian or your provider can give you more information on low-sodium plans or the DASH (Dietary Approaches to Stop Hypertension) eating plan  The DASH plan is low in sodium, unhealthy fats, and total fat  It is high in potassium, calcium, and fiber  · Exercise to maintain a healthy weight  Exercise at least 30 minutes per day, on most days of the week  This will help decrease your blood pressure  Ask about the best exercise plan for you  · Decrease stress  This may help lower your BP  Learn ways to relax, such as deep breathing or listening to music  · Limit alcohol  Women should limit alcohol to 1 drink a day  Men should limit alcohol to 2 drinks a day  A drink of alcohol is 12 ounces of beer, 5 ounces of wine, or 1½ ounces of liquor  · Do not smoke  Nicotine and other chemicals in cigarettes and cigars can increase your BP and also cause lung damage   Ask your healthcare provider for information if you currently smoke and need help to quit  E-cigarettes or smokeless tobacco still contain nicotine  Talk to your healthcare provider before you use these products  Follow up with your healthcare provider as directed: You will need to return to have your BP checked and to have other lab tests done  Write down your questions so you remember to ask them during your visits  © 2017 [facility] [employer] Information is for End User's use only and may not be sold, redistributed or otherwise used for commercial purposes  All illustrations and images included in CareNotes® are the copyrighted property of [employer]  or [employer] Veneg[employer]  The above information is an  only  It is not intended as medical advice for individual conditions or treatments  Talk to your doctor, nurse or pharmacist before following any medical regimen to see if it is safe and effective for you  Obesity   AMBULATORY CARE:   Obesity  is when your body mass index (BMI) is greater than 30  Your healthcare provider will use your height and weight to measure your BMI  The risks of obesity include  many health problems, such as injuries or physical disability  You may need tests to check for the following:  · Diabetes     · High blood pressure or high cholesterol     · Heart disease     · Gallbladder or liver disease     · Cancer of the colon, breast, prostate, liver, or kidney     · Sleep apnea     · Arthritis or gout  Seek care immediately if:   · You have a severe headache, confusion, or difficulty speaking  · You have weakness on one side of your body  · You have chest pain, sweating, or shortness of breath  Contact your healthcare provider if:   · You have symptoms of gallbladder or liver disease, such as pain in your upper abdomen  · You have knee or hip pain and discomfort while walking  · You have symptoms of diabetes, such as intense hunger and thirst, and frequent urination  · You have symptoms of sleep apnea, such as snoring or daytime sleepiness  · You have questions or concerns about your condition or care  Treatment for obesity  focuses on helping you lose weight to improve your health  Even a small decrease in BMI can reduce the risk for many health problems  Your healthcare provider will help you set a weight-loss goal   · Lifestyle changes  are the first step in treating obesity  These include making healthy food choices and getting regular physical activity  Your healthcare provider may suggest a weight-loss program that involves coaching, education, and therapy  · Medicine  may help you lose weight when it is used with a healthy diet and physical activity  · Surgery  can help you lose weight if you are very obese and have other health problems  There are several types of weight-loss surgery  Ask your healthcare provider for more information  Be successful losing weight:   · Set small, realistic goals  An example of a small goal is to walk for 20 minutes 5 days a week  Anther goal is to lose 5% of your body weight  · Tell friends, family members, and coworkers about your goals  and ask for their support  Ask a friend to lose weight with you, or join a weight-loss support group  · Identify foods or triggers that may cause you to overeat , and find ways to avoid them  Remove tempting high-calorie foods from your home and workplace  Place a bowl of fresh fruit on your kitchen counter  If stress causes you to eat, then find other ways to cope with stress  · Keep a diary to track what you eat and drink  Also write down how many minutes of physical activity you do each day  Weigh yourself once a week and record it in your diary  Eating changes: You will need to eat 500 to 1,000 fewer calories each day than you currently eat to lose 1 to 2 pounds a week  The following changes will help you cut calories:  · Eat smaller portions    Use small plates, no larger than 9 inches in diameter  Fill your plate half full of fruits and vegetables  Measure your food using measuring cups until you know what a serving size looks like  · Eat 3 meals and 1 or 2 snacks each day  Plan your meals in advance  pOal Alexander and eat at home most of the time  Eat slowly  · Eat fruits and vegetables at every meal   They are low in calories and high in fiber, which makes you feel full  Do not add butter, margarine, or cream sauce to vegetables  Use herbs to season steamed vegetables  · Eat less fat and fewer fried foods  Eat more baked or grilled chicken and fish  These protein sources are lower in calories and fat than red meat  Limit fast food  Dress your salads with olive oil and vinegar instead of bottled dressing  · Limit the amount of sugar you eat  Do not drink sugary beverages  Limit alcohol  Activity changes:  Physical activity is good for your body in many ways  It helps you burn calories and build strong muscles  It decreases stress and depression, and improves your mood  It can also help you sleep better  Talk to your healthcare provider before you begin an exercise program   · Exercise for at least 30 minutes 5 days a week  Start slowly  Set aside time each day for physical activity that you enjoy and that is convenient for you  It is best to do both weight training and an activity that increases your heart rate, such as walking, bicycling, or swimming  · Find ways to be more active  Do yard work and housecleaning  Walk up the stairs instead of using elevators  Spend your leisure time going to events that require walking, such as outdoor festivals or fairs  This extra physical activity can help you lose weight and keep it off  Follow up with your healthcare provider as directed: You may need to meet with a dietitian  Write down your questions so you remember to ask them during your visits     © 2017 [employer] Information is for End User's use only and may not be sold, redistributed or otherwise used for commercial purposes  All illustrations and images included in CareNotes® are the copyrighted property of [employer]  or Jeremy Venegas  The above information is an  only  It is not intended as medical advice for individual conditions or treatments  Talk to your doctor, nurse or pharmacist before following any medical regimen to see if it is safe and effective for you  Low Fat Diet   AMBULATORY CARE:   A low-fat diet  is an eating plan that is low in total fat, unhealthy fat, and cholesterol  You may need to follow a low-fat diet if you have trouble digesting or absorbing fat  You may also need to follow this diet if you have high cholesterol  You can also lower your cholesterol by increasing the amount of fiber in your diet  Soluble fiber is a type of fiber that helps to decrease cholesterol levels  Different types of fat in food:   · Limit unhealthy fats  A diet that is high in cholesterol, saturated fat, and trans fat may cause unhealthy cholesterol levels  Unhealthy cholesterol levels increase your risk of heart disease  ¨ Cholesterol:  Limit intake of cholesterol to less than 200 mg per day  Cholesterol is found in meat, eggs, and dairy  ¨ Saturated fat:  Limit saturated fat to less than 7% of your total daily calories  Ask your dietitian how many calories you need each day  Saturated fat is found in butter, cheese, ice cream, whole milk, and palm oil  Saturated fat is also found in meat, such as beef, pork, chicken skin, and processed meats  Processed meats include sausage, hot dogs, and bologna  ¨ Trans fat:  Avoid trans fat as much as possible  Trans fat is used in fried and baked foods  Foods that say trans fat free on the label may still have up to 0 5 grams of trans fat per serving  · Include healthy fats  Replace foods that are high in saturated and trans fat with foods high in healthy fats  This may help to decrease high cholesterol levels  ¨ Monounsaturated fats: These are found in avocados, nuts, and vegetable oils, such as olive, canola, and sunflower oil  ¨ Polyunsaturated fats: These can be found in vegetable oils, such as soybean or corn oil  Omega-3 fats can help to decrease the risk of heart disease  Omega-3 fats are found in fish, such as salmon, herring, trout, and tuna  Omega-3 fats can also be found in plant foods, such as walnuts, flaxseed, soybeans, and canola oil    Foods to limit or avoid:   · Grains:      ¨ Snacks that are made with partially hydrogenated oils, such as chips, regular crackers, and butter-flavored popcorn    ¨ High-fat baked goods, such as biscuits, croissants, doughnuts, pies, cookies, and pastries    · Dairy:      ¨ Whole milk, 2% milk, and yogurt and ice cream made with whole milk    ¨ Half and half creamer, heavy cream, and whipping cream    ¨ Cheese, cream cheese, and sour cream    · Meats and proteins:      ¨ High-fat cuts of meat (T-bone steak, regular hamburger, and ribs)    ¨ Fried meat, poultry (turkey and chicken), and fish    ¨ Poultry (chicken and turkey) with skin    ¨ Cold cuts (salami or bologna), hot dogs, dela cruz, and sausage    ¨ Whole eggs and egg yolks    · Vegetables and fruits with added fat:      ¨ Fried vegetables or vegetables in butter or high-fat sauces, such as cream or cheese sauces    ¨ Fried fruit or fruit served with butter or cream    · Fats:      ¨ Butter, stick margarine, and shortening    ¨ Coconut, palm oil, and palm kernel oil  Foods to include:   · Grains:      ¨ Whole-grain breads, cereals, pasta, and brown rice    ¨ Low-fat crackers and pretzels    · Vegetables and fruits:      ¨ Fresh, frozen, or canned vegetables (no salt or low-sodium)    ¨ Fresh, frozen, dried, or canned fruit (canned in light syrup or fruit juice)    ¨ Avocado    · Low-fat dairy products:      ¨ Nonfat (skim) or 1% milk    ¨ Nonfat or low-fat cheese, yogurt, and cottage cheese    · Meats and proteins:      ¨ Chicken or turkey with no skin    ¨ Baked or broiled fish    ¨ Lean beef and pork (loin, round, extra lean hamburger)    ¨ Beans and peas, unsalted nuts, soy products    ¨ Egg whites and substitutes    ¨ Seeds and nuts    · Fats:      ¨ Unsaturated oil, such as canola, olive, peanut, soybean, or sunflower oil    ¨ Soft or liquid margarine and vegetable oil spread    ¨ Low-fat salad dressing  Other ways to decrease fat:   · Read food labels before you buy foods  Choose foods that have less than 30% of calories from fat  Choose low-fat or fat-free dairy products  Remember that fat free does not mean calorie free  These foods still contain calories, and too many calories can lead to weight gain  · Trim fat from meat and avoid fried food  Trim all visible fat from meat before you cook it  Remove the skin from poultry  Do not acuna meat, fish, or poultry  Bake, roast, boil, or broil these foods instead  Avoid fried foods  Eat a baked potato instead of [place] fries  Steam vegetables instead of sautéing them in butter  · Add less fat to foods  Use imitation dela cruz bits on salads and baked potatoes instead of regular dela cruz bits  Use fat-free or low-fat salad dressings instead of regular dressings  Use low-fat or nonfat butter-flavored topping instead of regular butter or margarine on popcorn and other foods  Ways to decrease fat in recipes:  Replace high-fat ingredients with low-fat or nonfat ones  This may cause baked goods to be drier than usual  You may need to use nonfat cooking spray on pans to prevent food from sticking  You also may need to change the amount of other ingredients, such as water, in the recipe  Try the following:  · Use low-fat or light margarine instead of regular margarine or shortening  · Use lean ground turkey breast or chicken, or lean ground beef (less than 5% fat) instead of hamburger       · Add 1 teaspoon of canola oil to 8 ounces of skim milk instead of using cream or half and half  · Use grated zucchini, carrots, or apples in breads instead of coconut  · Use blenderized, low-fat cottage cheese, plain tofu, or low-fat ricotta cheese instead of cream cheese  · Use 1 egg white and 1 teaspoon of canola oil, or use ¼ cup (2 ounces) of fat-free egg substitute instead of a whole egg  · Replace half of the oil that is called for in a recipe with applesauce when you bake  Use 3 tablespoons of cocoa powder and 1 tablespoon of canola oil instead of a square of baking chocolate  How to increase fiber:  Eat enough high-fiber foods to get 20 to 30 grams of fiber every day  Slowly increase your fiber intake to avoid stomach cramps, gas, and other problems  · Eat 3 ounces of whole-grain foods each day  An ounce is about 1 slice of bread  Eat whole-grain breads, such as whole-wheat bread  Whole wheat, whole-wheat flour, or other whole grains should be listed as the first ingredient on the food label  Replace white flour with whole-grain flour or use half of each in recipes  Whole-grain flour is heavier than white flour, so you may have to add more yeast or baking powder  · Eat a high-fiber cereal for breakfast   Oatmeal is a good source of soluble fiber  Look for cereals that have bran or fiber in the name  Choose whole-grain products, such as brown rice, barley, and whole-wheat pasta  · Eat more beans, peas, and lentils  For example, add beans to soups or salads  Eat at least 5 cups of fruits and vegetables each day  Eat fruits and vegetables with the peel because the peel is high in fiber  © 2017 [address]  Information is for End User's use only and may not be sold, redistributed or otherwise used for commercial purposes  All illustrations and images included in CareNotes® are the copyrighted property of [employer]  or Jeremy Venegas  The above information is an  only   It is not intended as medical advice for individual conditions or treatments  Talk to your doctor, nurse or pharmacist before following any medical regimen to see if it is safe and effective for you  Heart Healthy Diet   AMBULATORY CARE:   A heart healthy diet  is an eating plan low in total fat, unhealthy fats, and sodium (salt)  A heart healthy diet helps decrease your risk for heart disease and stroke  Limit the amount of fat you eat to 25% to 35% of your total daily calories  Limit sodium to less than 2,300 mg each day  Healthy fats:  Healthy fats can help improve cholesterol levels  The risk for heart disease is decreased when cholesterol levels are normal  Choose healthy fats, such as the following:  · Unsaturated fat  is found in foods such as soybean, canola, olive, corn, and safflower oils  It is also found in soft tub margarine that is made with liquid vegetable oil  · Omega-3 fat  is found in certain fish, such as salmon, tuna, and trout, and in walnuts and flaxseed  Unhealthy fats:  Unhealthy fats can cause unhealthy cholesterol levels in your blood and increase your risk of heart disease  Limit unhealthy fats, such as the following:  · Cholesterol  is found in animal foods, such as eggs and lobster, and in dairy products made from whole milk  Limit cholesterol to less than 300 milligrams (mg) each day  You may need to limit cholesterol to 200 mg each day if you have heart disease  · Saturated fat  is found in meats, such as dela cruz and hamburger  It is also found in chicken or turkey skin, whole milk, and butter  Limit saturated fat to less than 7% of your total daily calories  Limit saturated fat to less than 6% if you have heart disease or are at increased risk for it  · Trans fat  is found in packaged foods, such as potato chips and cookies  It is also in hard margarine, some fried foods, and shortening  Avoid trans fats as much as possible    Heart healthy foods and drinks to include:  Ask your dietitian or healthcare provider how many servings to have from each of the following food groups:  · Grains:      ¨ Whole-wheat breads, cereals, and pastas, and brown rice    ¨ Low-fat, low-sodium crackers and chips    · Vegetables:      ¨ Broccoli, green beans, green peas, and spinach    ¨ Collards, kale, and lima beans    ¨ Carrots, sweet potatoes, tomatoes, and peppers    ¨ Canned vegetables with no salt added    · Fruits:      ¨ Bananas, peaches, pears, and pineapple    ¨ Grapes, raisins, and dates    ¨ Oranges, tangerines, grapefruit, orange juice, and grapefruit juice    ¨ Apricots, mangoes, melons, and papaya    ¨ Raspberries and strawberries    ¨ Canned fruit with no added sugar    · Low-fat dairy products:      ¨ Nonfat (skim) milk, 1% milk, and low-fat almond, cashew, or soy milks fortified with calcium    ¨ Low-fat cheese, regular or frozen yogurt, and cottage cheese    · Meats and proteins , such as lean cuts of beef and pork (loin, leg, round), skinless chicken and turkey, legumes, soy products, egg whites, and nuts  Foods and drinks to limit or avoid:  Ask your dietitian or healthcare provider about these and other foods that are high in unhealthy fat, sodium, and sugar:  · Snack or packaged foods , such as frozen dinners, cookies, macaroni and cheese, and cereals with more than 300 mg of sodium per serving    · Canned or dry mixes  for cakes, soups, sauces, or gravies    · Vegetables with added sodium , such as instant potatoes, vegetables with added sauces, or regular canned vegetables    · Other foods high in sodium , such as ketchup, barbecue sauce, salad dressing, pickles, olives, soy sauce, and miso    · High-fat dairy foods  such as whole or 2% milk, cream cheese, or sour cream, and cheeses     · High-fat protein foods  such as high-fat cuts of beef (T-bone steaks, ribs), chicken or turkey with skin, and organ meats, such as liver    · Cured or smoked meats , such as hot dogs, dela cruz, and sausage    · Unhealthy fats and oils , such as butter, stick margarine, shortening, and cooking oils such as coconut or palm oil    · Food and drinks high in sugar , such as soft drinks (soda), sports drinks, sweetened tea, candy, cake, cookies, pies, and doughnuts  Other diet guidelines to follow:   · Eat more foods containing omega-3 fats  Eat fish high in omega-3 fats at least 2 times a week  · Limit alcohol  Too much alcohol can damage your heart and raise your blood pressure  Women should limit alcohol to 1 drink a day  Men should limit alcohol to 2 drinks a day  A drink of alcohol is 12 ounces of beer, 5 ounces of wine, or 1½ ounces of liquor  · Choose low-sodium foods  High-sodium foods can lead to high blood pressure  Add little or no salt to food you prepare  Use herbs and spices in place of salt  · Eat more fiber  to help lower cholesterol levels  Eat at least 5 servings of fruits and vegetables each day  Eat 3 ounces of whole-grain foods each day  Legumes (beans) are also a good source of fiber  Lifestyle guidelines:   · Do not smoke  Nicotine and other chemicals in cigarettes and cigars can cause lung and heart damage  Ask your healthcare provider for information if you currently smoke and need help to quit  E-cigarettes or smokeless tobacco still contain nicotine  Talk to your healthcare provider before you use these products  · Exercise regularly  to help you maintain a healthy weight and improve your blood pressure and cholesterol levels  Ask your healthcare provider about the best exercise plan for you  Do not start an exercise program without asking your healthcare provider  Follow up with your healthcare provider as directed:  Write down your questions so you remember to ask them during your visits  © 2017 [address] [employer] Information is for End User's use only and may not be sold, redistributed or otherwise used for commercial purposes   All illustrations and images included in CareNotes® are the copyrighted property of [employer]  or Jeremy Venegas  The above information is an  only  It is not intended as medical advice for individual conditions or treatments  Talk to your doctor, nurse or pharmacist before following any medical regimen to see if it is safe and effective for you

## 2019-10-15 DIAGNOSIS — I10 HYPERTENSION, UNSPECIFIED TYPE: ICD-10-CM

## 2019-10-15 RX ORDER — LISINOPRIL 10 MG/1
10 TABLET ORAL DAILY
Qty: 30 TABLET | Refills: 5 | Status: SHIPPED | OUTPATIENT
Start: 2019-10-15 | End: 2020-04-16 | Stop reason: SDUPTHER

## 2019-10-24 DIAGNOSIS — G47.9 SLEEP DISTURBANCE: ICD-10-CM

## 2019-10-24 RX ORDER — ZOLPIDEM TARTRATE 10 MG/1
10 TABLET ORAL
Qty: 30 TABLET | Refills: 0 | Status: SHIPPED | OUTPATIENT
Start: 2019-10-24 | End: 2019-11-27 | Stop reason: SDUPTHER

## 2019-10-24 NOTE — TELEPHONE ENCOUNTER
Scheduled Medication Review:  Pt's scheduled medication use was reviewed by myself/staff via the [employer] website  Pt's use has been found to be appropriate w/o any concerns for misuse by the patient  Pt's current conditions require continued scheduled medication use at this time  Future review for continued appropriate medication use and misuse will continue

## 2019-11-26 ENCOUNTER — TELEPHONE (OUTPATIENT)
Dept: INTERNAL MEDICINE CLINIC | Facility: CLINIC | Age: 59
End: 2019-11-26

## 2019-11-27 DIAGNOSIS — G47.9 SLEEP DISTURBANCE: ICD-10-CM

## 2019-11-27 RX ORDER — ZOLPIDEM TARTRATE 10 MG/1
10 TABLET ORAL
Qty: 30 TABLET | Refills: 0 | Status: SHIPPED | OUTPATIENT
Start: 2019-11-27 | End: 2019-12-26 | Stop reason: SDUPTHER

## 2019-11-27 NOTE — TELEPHONE ENCOUNTER
Scheduled Medication Review:  Pt's scheduled medication use was reviewed by myself/staff via the [employer] website  Pt's use has been found to be appropriate w/o any concerns for misuse by the patient  Pt's current conditions require continued scheduled medication use at this time  Future review for continued appropriate medication use and misuse will continue

## 2019-12-13 ENCOUNTER — CONSULT (OUTPATIENT)
Dept: INTERNAL MEDICINE CLINIC | Facility: CLINIC | Age: 59
End: 2019-12-13
Payer: COMMERCIAL

## 2019-12-13 VITALS
TEMPERATURE: 98.9 F | SYSTOLIC BLOOD PRESSURE: 126 MMHG | OXYGEN SATURATION: 98 % | HEART RATE: 80 BPM | BODY MASS INDEX: 44.1 KG/M2 | HEIGHT: 71 IN | WEIGHT: 315 LBS | DIASTOLIC BLOOD PRESSURE: 76 MMHG

## 2019-12-13 DIAGNOSIS — G47.33 OBSTRUCTIVE SLEEP APNEA: ICD-10-CM

## 2019-12-13 DIAGNOSIS — I10 ESSENTIAL HYPERTENSION: ICD-10-CM

## 2019-12-13 DIAGNOSIS — E66.01 MORBID OBESITY (HCC): ICD-10-CM

## 2019-12-13 DIAGNOSIS — Z01.818 VISIT FOR PRE-OPERATIVE EXAMINATION: Primary | ICD-10-CM

## 2019-12-13 PROCEDURE — 99244 OFF/OP CNSLTJ NEW/EST MOD 40: CPT | Performed by: INTERNAL MEDICINE

## 2019-12-13 NOTE — PROGRESS NOTES
Assessment/Plan:  Problem List Items Addressed This Visit     None           There are no diagnoses linked to this encounter  No problem-specific Assessment & Plan notes found for this encounter  A/P: PAT labs, CXR, and ekg reviewed and acceptable  Pt and co-morbidities are controlled  Pt is a Guillaume's Class I with a cardiac risk of 1%, but is at high due to his body habitus  Pt to hold any NSAID's, ASA, omega 3,and MVT one week prior  Would hold his lisinopril at least 24 hours prior to sx  On the say of sx, may take his Lexapro with a sip of water and resume the rest of his meds once he is eating  Pt to bring his CPAP to the hospital  Will need aggressive pulmonary secretion control due to body habitus  Needs aggressive perioperative DVT prophylaxis due to past history and body habitus  Get pt mobile ASAP  No other recs at this time  Thanks and good luck  Subjective:      Patient ID: Gary Knox is a 63 y o  male  WM presents at the request of Dr Juliana Montoya for pre-op eval for upcoming gastric sleeve sx  tentatively scheduled for 12/20  Since last visit, doing well and no recent illnesses  Remains active w/o difficulty and no falls  No travel history  Denies depression  No recent illnesses  No fever, chills, or sweats  No unexplained wt changes  Denies CP, SOB, or palpitations  No edema  No orthopnea or PND  No sz or syncope  No changes in bowel or bladder habits  PMH includes seasonal allergies, ELIDIA, morbid obesity,  DVT, HTN, first degree AVB, Melanoma, BRITTANI, hyperlipidemia, DJD, Sleep disturbances, and vitamin D  Past sx include knee sx, uvulectomy, tonsils,  and Moh's  and reports no problems with prior procedures or anesthesia  Non smoking and social ETOH use  Has a  history of DVT, but no PE  NO history of bleeding issues and is not on anti-coagulants  Denies dental plates  Denies C spine issues  No objections to getting blood products if deemed necessary  Had PAT testing done         The following portions of the patient's history were reviewed and updated as appropriate:   He has a past medical history of Arthritis, 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[facility] [address] ), Cancer ([facility] [address] ), Generalized anxiety disorder, Hypertension, Obesity, and Primary osteoarthritis of both knees  ,  does not have any pertinent problems on file  ,   has a past surgical history that includes Knee surgery (Bilateral, 2006, 2008); Knee arthroscopy; Mohs surgery; Tonsillectomy and adenoidectomy; and Uvulectomy  ,  family history includes Arthritis in his maternal grandmother; Cancer in his mother; Heart disease in his father, maternal grandmother, other, and paternal grandmother; Hypotension in his mother; Liver cancer in his other and paternal grandfather; Lung cancer in his other; Other in his brother; Prostate cancer in his family and other; Stroke in his maternal grandfather, maternal grandmother, and mother; Thyroid cancer in his mother  ,   reports that he has never smoked  He has never used smokeless tobacco  He reports that he drinks alcohol  He reports that he does not use drugs  ,  is allergic to penicillin v   Current Outpatient Medications   Medication Sig Dispense Refill    escitalopram (LEXAPRO) 20 mg tablet Take 1 tablet (20 mg total) by mouth daily 30 tablet 5    lisinopril (ZESTRIL) 10 mg tablet Take 1 tablet (10 mg total) by mouth daily 30 tablet 5    zolpidem (AMBIEN) 10 mg tablet Take 1 tablet (10 mg total) by mouth daily at bedtime as needed for sleep 30 tablet 0     No current facility-administered medications for this visit  Review of Systems   Constitutional: Negative for activity change, chills, diaphoresis, fatigue and fever  HENT: Negative  Eyes: Negative for visual disturbance  Respiratory: Negative for cough, chest tightness, shortness of breath and wheezing  Cardiovascular: Negative for chest pain, palpitations and leg swelling     Gastrointestinal: Negative for abdominal pain, constipation, diarrhea, nausea and vomiting  Endocrine: Negative for cold intolerance and heat intolerance  Genitourinary: Negative for difficulty urinating, dysuria and frequency  Musculoskeletal: Negative for arthralgias, gait problem and myalgias  Allergic/Immunologic: Negative for immunocompromised state  Neurological: Negative for dizziness, seizures, syncope, weakness, light-headedness, numbness and headaches  Psychiatric/Behavioral: Negative for confusion, dysphoric mood, sleep disturbance and suicidal ideas  The patient is not nervous/anxious  PHQ-9 Depression Screening    PHQ-9:    Frequency of the following problems over the past two weeks:       Little interest or pleasure in doing things:  0 - not at all  Feeling down, depressed, or hopeless:  0 - not at all  PHQ-2 Score:  0        Objective:  Vitals:    12/13/19 1324   BP: 126/76   Pulse: 80   Temp: 98 9 °F (37 2 °C)   SpO2: 98%   Weight: (!) 173 kg (380 lb 8 oz)   Height: 5' 11" (1 803 m)     Body mass index is 53 07 kg/m²  Physical Exam   Constitutional: He is oriented to person, place, and time  He appears well-developed and well-nourished  No distress  HENT:   Head: Normocephalic and atraumatic  Mouth/Throat: Oropharynx is clear and moist    No oropharyngeal obstructions  Eyes: Pupils are equal, round, and reactive to light  Conjunctivae and EOM are normal    Neck: Normal range of motion  Neck supple  No JVD present  No tracheal deviation present  No thyromegaly present  No c spine restrictions  Cardiovascular: Normal rate, regular rhythm and normal heart sounds  Pulmonary/Chest: Effort normal and breath sounds normal  No respiratory distress  He has no wheezes  He has no rales  Abdominal: Soft  Bowel sounds are normal  He exhibits no distension  There is no tenderness  Musculoskeletal: Normal range of motion  He exhibits no edema, tenderness or deformity  Lymphadenopathy:     He has no cervical adenopathy  Neurological: He is alert and oriented to person, place, and time  He displays normal reflexes  No cranial nerve deficit  He exhibits normal muscle tone  Coordination normal    Psychiatric: He has a normal mood and affect  His behavior is normal  Judgment and thought content normal    Nursing note and vitals reviewed

## 2019-12-26 DIAGNOSIS — G47.9 SLEEP DISTURBANCE: ICD-10-CM

## 2019-12-26 RX ORDER — ZOLPIDEM TARTRATE 10 MG/1
10 TABLET ORAL
Qty: 30 TABLET | Refills: 0 | Status: SHIPPED | OUTPATIENT
Start: 2019-12-26 | End: 2020-01-27 | Stop reason: SDUPTHER

## 2019-12-26 NOTE — TELEPHONE ENCOUNTER
Scheduled Medication Review:  Pt's scheduled medication use was reviewed by myself/staff via the [employer] website  Pt's use has been found to be appropriate w/o any concerns for misuse by the patient  Pt's current conditions require continued scheduled medication use at this time  Future review for continued appropriate medication use and misuse will continue

## 2020-01-10 ENCOUNTER — TELEPHONE (OUTPATIENT)
Dept: GASTROENTEROLOGY | Facility: CLINIC | Age: 60
End: 2020-01-10

## 2020-01-10 NOTE — TELEPHONE ENCOUNTER
Failed OA (CPAP)  New patient appt scheduled with Segundo Pollock at [facility] on 4/30/20      01/10/20  Screened by: Kalpana Fleming MA    Referring Provider Dr Montes    : Rodger Johnson  If patient answers NO to medical questions, then schedule procedure  If patient answers YES to medical questions, then schedule office appointment  Previous Colonoscopy no  Date and Facility of last colonoscopy? Comments:        Pre- Screening: There is no height or weight on file to calculate BMI  Has patient been referred for a routine screening Colonoscopy? no  Is the patient between 39-70 years old? yes    SCHEDULING STAFF   If the patient is between 45yrs-49yrs, please advise patient to confirm benefits/coverage with their insurance company for a routine screening colonoscopy, some insurance carriers will only cover at [phone] or older   If the patient is over 76years old, please schedule an office visit   If the patient had a previous colonoscopy send to the procedure  before continuing    Have you been diagnosed with a bleeding disorder or anemia? no    Do you take NO  Have you been diagnosed with Diabetes or are you taking any   Diabetic medications? NO    Do you have any of the following symptoms? NO    Have you had a coronary or vascular stent within the last year? no    Have you had a heart attack or stroke in the last 6 months? no    Have you had intestinal surgery in the last 3 months? no    Do you have problems with: NO    Do you use:  cpap    Have you been hospitalized in the last Month? no    Have you had chest pain (angina) or breathing problems  (COPD) in the last 3 months? no    Do you have any difficulty walking up a flight of stairs? no    Have you had Kidney failure or insufficiency? no    Have you had heart valve surgery? no    Are you confined to a wheelchair?  no

## 2020-01-13 ENCOUNTER — TELEPHONE (OUTPATIENT)
Dept: GASTROENTEROLOGY | Facility: CLINIC | Age: 60
End: 2020-01-13

## 2020-01-13 NOTE — TELEPHONE ENCOUNTER
Left message on machine for patient to contact our office to arrange appointment for colonoscopy consult  As per Tello JEFFERY  Patient fld OA

## 2020-01-27 DIAGNOSIS — F41.1 GENERALIZED ANXIETY DISORDER: ICD-10-CM

## 2020-01-27 DIAGNOSIS — G47.9 SLEEP DISTURBANCE: ICD-10-CM

## 2020-01-27 RX ORDER — ESCITALOPRAM OXALATE 20 MG/1
20 TABLET ORAL DAILY
Qty: 30 TABLET | Refills: 5 | Status: SHIPPED | OUTPATIENT
Start: 2020-01-27 | End: 2020-07-31 | Stop reason: SDUPTHER

## 2020-01-27 RX ORDER — ZOLPIDEM TARTRATE 10 MG/1
10 TABLET ORAL
Qty: 30 TABLET | Refills: 0 | Status: SHIPPED | OUTPATIENT
Start: 2020-01-27 | End: 2020-02-25 | Stop reason: SDUPTHER

## 2020-01-27 NOTE — TELEPHONE ENCOUNTER
Scheduled Medication Review:  Pt's scheduled medication use was reviewed by myself/staff via the [employer] website  Pt's use has been found to be appropriate w/o any concerns for misuse by the patient  Pt's current conditions require continued scheduled medication use at this time  Future review for continued appropriate medication use and misuse will continue

## 2020-02-25 DIAGNOSIS — G47.9 SLEEP DISTURBANCE: ICD-10-CM

## 2020-02-25 RX ORDER — ZOLPIDEM TARTRATE 10 MG/1
10 TABLET ORAL
Qty: 30 TABLET | Refills: 0 | Status: SHIPPED | OUTPATIENT
Start: 2020-02-25 | End: 2020-03-25 | Stop reason: SDUPTHER

## 2020-02-25 NOTE — TELEPHONE ENCOUNTER
Scheduled Medication Review:  Pt's scheduled medication use was reviewed by myself/staff via the [employer] website  Pt's use has been found to be appropriate w/o any concerns for misuse by the patient  Pt's current conditions require continued scheduled medication use at this time  Future review for continued appropriate medication use and misuse will continue

## 2020-03-25 DIAGNOSIS — G47.9 SLEEP DISTURBANCE: ICD-10-CM

## 2020-03-25 RX ORDER — ZOLPIDEM TARTRATE 10 MG/1
10 TABLET ORAL
Qty: 30 TABLET | Refills: 0 | Status: SHIPPED | OUTPATIENT
Start: 2020-03-25 | End: 2020-04-27 | Stop reason: SDUPTHER

## 2020-03-25 NOTE — TELEPHONE ENCOUNTER
Scheduled Medication Review:  Pt's scheduled medication use was reviewed by myself/staff via the [employer] website  Pt's use has been found to be appropriate w/o any concerns for misuse by the patient  Pt's current conditions require continued scheduled medication use at this time  Future review for continued appropriate medication use and misuse will continue

## 2020-04-16 DIAGNOSIS — I10 HYPERTENSION, UNSPECIFIED TYPE: ICD-10-CM

## 2020-04-16 RX ORDER — LISINOPRIL 10 MG/1
10 TABLET ORAL DAILY
Qty: 30 TABLET | Refills: 5 | Status: SHIPPED | OUTPATIENT
Start: 2020-04-16 | End: 2020-10-20 | Stop reason: SDUPTHER

## 2020-04-27 DIAGNOSIS — G47.9 SLEEP DISTURBANCE: ICD-10-CM

## 2020-04-27 RX ORDER — ZOLPIDEM TARTRATE 10 MG/1
10 TABLET ORAL
Qty: 30 TABLET | Refills: 0 | Status: SHIPPED | OUTPATIENT
Start: 2020-04-27 | End: 2020-05-26 | Stop reason: SDUPTHER

## 2020-04-29 RX ORDER — KETOCONAZOLE 20 MG/ML
SHAMPOO TOPICAL
COMMUNITY
Start: 2020-01-28

## 2020-04-29 RX ORDER — PANTOPRAZOLE SODIUM 40 MG/1
40 TABLET, DELAYED RELEASE ORAL DAILY
COMMUNITY
Start: 2019-12-20 | End: 2020-04-30

## 2020-04-29 RX ORDER — CEPHALEXIN 500 MG/1
CAPSULE ORAL
COMMUNITY
Start: 2020-02-21 | End: 2020-06-09

## 2020-04-29 RX ORDER — PANTOPRAZOLE SODIUM 40 MG/1
40 TABLET, DELAYED RELEASE ORAL DAILY
COMMUNITY
Start: 2020-03-13 | End: 2020-04-30

## 2020-04-30 ENCOUNTER — TELEPHONE (OUTPATIENT)
Dept: GASTROENTEROLOGY | Facility: CLINIC | Age: 60
End: 2020-04-30

## 2020-04-30 ENCOUNTER — TELEMEDICINE (OUTPATIENT)
Dept: GASTROENTEROLOGY | Facility: CLINIC | Age: 60
End: 2020-04-30
Payer: COMMERCIAL

## 2020-04-30 VITALS — BODY MASS INDEX: 41.84 KG/M2 | WEIGHT: 300 LBS

## 2020-04-30 DIAGNOSIS — Z12.11 SCREEN FOR COLON CANCER: Primary | ICD-10-CM

## 2020-04-30 PROCEDURE — 99213 OFFICE O/P EST LOW 20 MIN: CPT | Performed by: PHYSICIAN ASSISTANT

## 2020-05-26 DIAGNOSIS — G47.9 SLEEP DISTURBANCE: ICD-10-CM

## 2020-05-26 RX ORDER — ZOLPIDEM TARTRATE 10 MG/1
10 TABLET ORAL
Qty: 30 TABLET | Refills: 0 | Status: SHIPPED | OUTPATIENT
Start: 2020-05-26 | End: 2020-06-26 | Stop reason: SDUPTHER

## 2020-05-27 DIAGNOSIS — Z11.59 SCREENING FOR VIRAL DISEASE: Primary | ICD-10-CM

## 2020-06-01 ENCOUNTER — TELEPHONE (OUTPATIENT)
Dept: GASTROENTEROLOGY | Facility: CLINIC | Age: 60
End: 2020-06-01

## 2020-06-04 ENCOUNTER — TELEPHONE (OUTPATIENT)
Dept: GASTROENTEROLOGY | Facility: CLINIC | Age: 60
End: 2020-06-04

## 2020-06-04 DIAGNOSIS — Z11.59 SCREENING FOR VIRAL DISEASE: ICD-10-CM

## 2020-06-04 PROCEDURE — U0003 INFECTIOUS AGENT DETECTION BY NUCLEIC ACID (DNA OR RNA); SEVERE ACUTE RESPIRATORY SYNDROME CORONAVIRUS 2 (SARS-COV-2) (CORONAVIRUS DISEASE [COVID-19]), AMPLIFIED PROBE TECHNIQUE, MAKING USE OF HIGH THROUGHPUT TECHNOLOGIES AS DESCRIBED BY CMS-2020-01-R: HCPCS

## 2020-06-08 ENCOUNTER — ANESTHESIA EVENT (OUTPATIENT)
Dept: PERIOP | Facility: HOSPITAL | Age: 60
End: 2020-06-08

## 2020-06-09 ENCOUNTER — ANESTHESIA (OUTPATIENT)
Dept: PERIOP | Facility: HOSPITAL | Age: 60
End: 2020-06-09

## 2020-06-09 ENCOUNTER — HOSPITAL ENCOUNTER (OUTPATIENT)
Dept: PERIOP | Facility: HOSPITAL | Age: 60
Setting detail: OUTPATIENT SURGERY
Discharge: HOME | End: 2020-06-09
Payer: COMMERCIAL

## 2020-06-09 VITALS
OXYGEN SATURATION: 98 % | RESPIRATION RATE: 18 BRPM | SYSTOLIC BLOOD PRESSURE: 116 MMHG | HEART RATE: 59 BPM | DIASTOLIC BLOOD PRESSURE: 61 MMHG | TEMPERATURE: 98 F

## 2020-06-09 DIAGNOSIS — Z12.11 SCREEN FOR COLON CANCER: ICD-10-CM

## 2020-06-09 LAB — SARS-COV-2 RNA SPEC QL NAA+PROBE: NOT DETECTED

## 2020-06-09 PROCEDURE — 45385 COLONOSCOPY W/LESION REMOVAL: CPT | Performed by: INTERNAL MEDICINE

## 2020-06-09 PROCEDURE — 88305 TISSUE EXAM BY PATHOLOGIST: CPT | Performed by: PATHOLOGY

## 2020-06-09 PROCEDURE — 45380 COLONOSCOPY AND BIOPSY: CPT | Performed by: INTERNAL MEDICINE

## 2020-06-09 RX ORDER — GLYCOPYRROLATE 0.2 MG/ML
INJECTION INTRAMUSCULAR; INTRAVENOUS AS NEEDED
Status: DISCONTINUED | OUTPATIENT
Start: 2020-06-09 | End: 2020-06-09 | Stop reason: SURG

## 2020-06-09 RX ORDER — ALBUTEROL SULFATE 2.5 MG/3ML
2.5 SOLUTION RESPIRATORY (INHALATION) ONCE AS NEEDED
Status: DISCONTINUED | OUTPATIENT
Start: 2020-06-09 | End: 2020-06-13 | Stop reason: HOSPADM

## 2020-06-09 RX ORDER — LIDOCAINE HYDROCHLORIDE 10 MG/ML
0.5 INJECTION, SOLUTION EPIDURAL; INFILTRATION; INTRACAUDAL; PERINEURAL ONCE AS NEEDED
Status: DISCONTINUED | OUTPATIENT
Start: 2020-06-09 | End: 2020-06-13 | Stop reason: HOSPADM

## 2020-06-09 RX ORDER — ONDANSETRON 2 MG/ML
4 INJECTION INTRAMUSCULAR; INTRAVENOUS ONCE AS NEEDED
Status: DISCONTINUED | OUTPATIENT
Start: 2020-06-09 | End: 2020-06-13 | Stop reason: HOSPADM

## 2020-06-09 RX ORDER — METOCLOPRAMIDE HYDROCHLORIDE 5 MG/ML
10 INJECTION INTRAMUSCULAR; INTRAVENOUS ONCE AS NEEDED
Status: DISCONTINUED | OUTPATIENT
Start: 2020-06-09 | End: 2020-06-13 | Stop reason: HOSPADM

## 2020-06-09 RX ORDER — LIDOCAINE HYDROCHLORIDE 10 MG/ML
INJECTION, SOLUTION EPIDURAL; INFILTRATION; INTRACAUDAL; PERINEURAL AS NEEDED
Status: DISCONTINUED | OUTPATIENT
Start: 2020-06-09 | End: 2020-06-09 | Stop reason: SURG

## 2020-06-09 RX ORDER — SODIUM CHLORIDE, SODIUM LACTATE, POTASSIUM CHLORIDE, CALCIUM CHLORIDE 600; 310; 30; 20 MG/100ML; MG/100ML; MG/100ML; MG/100ML
125 INJECTION, SOLUTION INTRAVENOUS CONTINUOUS
Status: DISCONTINUED | OUTPATIENT
Start: 2020-06-09 | End: 2020-06-13 | Stop reason: HOSPADM

## 2020-06-09 RX ORDER — PROPOFOL 10 MG/ML
INJECTION, EMULSION INTRAVENOUS AS NEEDED
Status: DISCONTINUED | OUTPATIENT
Start: 2020-06-09 | End: 2020-06-09 | Stop reason: SURG

## 2020-06-09 RX ADMIN — PROPOFOL 50 MG: 10 INJECTION, EMULSION INTRAVENOUS at 07:47

## 2020-06-09 RX ADMIN — GLYCOPYRROLATE 0.2 MG: 0.2 INJECTION, SOLUTION INTRAMUSCULAR; INTRAVENOUS at 07:31

## 2020-06-09 RX ADMIN — GLYCOPYRROLATE 0.2 MG: 0.2 INJECTION, SOLUTION INTRAMUSCULAR; INTRAVENOUS at 07:54

## 2020-06-09 RX ADMIN — PROPOFOL 50 MG: 10 INJECTION, EMULSION INTRAVENOUS at 08:05

## 2020-06-09 RX ADMIN — SODIUM CHLORIDE, SODIUM LACTATE, POTASSIUM CHLORIDE, AND CALCIUM CHLORIDE 125 ML/HR: .6; .31; .03; .02 INJECTION, SOLUTION INTRAVENOUS at 07:24

## 2020-06-09 RX ADMIN — SODIUM CHLORIDE, SODIUM LACTATE, POTASSIUM CHLORIDE, AND CALCIUM CHLORIDE: .6; .31; .03; .02 INJECTION, SOLUTION INTRAVENOUS at 07:11

## 2020-06-09 RX ADMIN — PROPOFOL 50 MG: 10 INJECTION, EMULSION INTRAVENOUS at 07:43

## 2020-06-09 RX ADMIN — PROPOFOL 50 MG: 10 INJECTION, EMULSION INTRAVENOUS at 07:50

## 2020-06-09 RX ADMIN — PROPOFOL 50 MG: 10 INJECTION, EMULSION INTRAVENOUS at 07:35

## 2020-06-09 RX ADMIN — LIDOCAINE HYDROCHLORIDE 100 MG: 10 INJECTION, SOLUTION EPIDURAL; INFILTRATION; INTRACAUDAL; PERINEURAL at 07:31

## 2020-06-09 RX ADMIN — PROPOFOL 50 MG: 10 INJECTION, EMULSION INTRAVENOUS at 07:39

## 2020-06-09 RX ADMIN — PROPOFOL 50 MG: 10 INJECTION, EMULSION INTRAVENOUS at 07:59

## 2020-06-09 RX ADMIN — PROPOFOL 100 MG: 10 INJECTION, EMULSION INTRAVENOUS at 07:31

## 2020-06-09 RX ADMIN — PROPOFOL 50 MG: 10 INJECTION, EMULSION INTRAVENOUS at 07:54

## 2020-06-11 ENCOUNTER — TELEPHONE (OUTPATIENT)
Dept: INTERNAL MEDICINE CLINIC | Facility: CLINIC | Age: 60
End: 2020-06-11

## 2020-06-11 DIAGNOSIS — Z20.828 EXPOSURE TO SARS-ASSOCIATED CORONAVIRUS: ICD-10-CM

## 2020-06-11 DIAGNOSIS — Z20.822 CLOSE EXPOSURE TO COVID-19 VIRUS: Primary | ICD-10-CM

## 2020-06-11 PROCEDURE — U0003 INFECTIOUS AGENT DETECTION BY NUCLEIC ACID (DNA OR RNA); SEVERE ACUTE RESPIRATORY SYNDROME CORONAVIRUS 2 (SARS-COV-2) (CORONAVIRUS DISEASE [COVID-19]), AMPLIFIED PROBE TECHNIQUE, MAKING USE OF HIGH THROUGHPUT TECHNOLOGIES AS DESCRIBED BY CMS-2020-01-R: HCPCS

## 2020-06-13 ENCOUNTER — TELEMEDICINE (OUTPATIENT)
Dept: INTERNAL MEDICINE CLINIC | Facility: CLINIC | Age: 60
End: 2020-06-13
Payer: COMMERCIAL

## 2020-06-13 DIAGNOSIS — Z20.822 CLOSE EXPOSURE TO COVID-19 VIRUS: Primary | ICD-10-CM

## 2020-06-13 LAB — SARS-COV-2 RNA SPEC QL NAA+PROBE: NOT DETECTED

## 2020-06-13 PROCEDURE — 99442 PR PHYS/QHP TELEPHONE EVALUATION 11-20 MIN: CPT | Performed by: INTERNAL MEDICINE

## 2020-06-15 ENCOUNTER — TELEPHONE (OUTPATIENT)
Dept: GASTROENTEROLOGY | Facility: AMBULARY SURGERY CENTER | Age: 60
End: 2020-06-15

## 2020-06-25 DIAGNOSIS — G47.9 SLEEP DISTURBANCE: ICD-10-CM

## 2020-06-25 LAB — EXT SARS-COV-2: DETECTED

## 2020-06-26 RX ORDER — ZOLPIDEM TARTRATE 10 MG/1
10 TABLET ORAL
Qty: 30 TABLET | Refills: 0 | Status: SHIPPED | OUTPATIENT
Start: 2020-06-26 | End: 2020-07-27 | Stop reason: SDUPTHER

## 2020-07-06 ENCOUNTER — TELEPHONE (OUTPATIENT)
Dept: INTERNAL MEDICINE CLINIC | Facility: CLINIC | Age: 60
End: 2020-07-06

## 2020-07-06 NOTE — TELEPHONE ENCOUNTER
No results on Care everywhere showing positive results  Need results in order to order f/u test  Also, pt will need two more negative test in a row at least 24 hours apart  Not just one

## 2020-07-06 NOTE — TELEPHONE ENCOUNTER
Call placed to office, no answer, left message on voicemail to return call to clinic.   Pt was tested positive on 06/26/20 by [facility]     Patient wants to be retested after his 10days +72hr quarintine, which is this Thursday  Does he need a script to get this test? At [facility] covid tent?

## 2020-07-07 DIAGNOSIS — U07.1 COVID-19 VIRUS DETECTED: Primary | ICD-10-CM

## 2020-07-08 DIAGNOSIS — U07.1 COVID-19 VIRUS DETECTED: ICD-10-CM

## 2020-07-08 PROCEDURE — U0003 INFECTIOUS AGENT DETECTION BY NUCLEIC ACID (DNA OR RNA); SEVERE ACUTE RESPIRATORY SYNDROME CORONAVIRUS 2 (SARS-COV-2) (CORONAVIRUS DISEASE [COVID-19]), AMPLIFIED PROBE TECHNIQUE, MAKING USE OF HIGH THROUGHPUT TECHNOLOGIES AS DESCRIBED BY CMS-2020-01-R: HCPCS

## 2020-07-09 PROBLEM — I44.1 MOBITZ (TYPE) I (WENCKEBACH'S) ATRIOVENTRICULAR BLOCK: Status: ACTIVE | Noted: 2020-02-03

## 2020-07-10 LAB — SARS-COV-2 RNA SPEC QL NAA+PROBE: NOT DETECTED

## 2020-07-11 DIAGNOSIS — Z20.828 EXPOSURE TO SARS-ASSOCIATED CORONAVIRUS: Primary | ICD-10-CM

## 2020-07-13 ENCOUNTER — TELEPHONE (OUTPATIENT)
Dept: INTERNAL MEDICINE CLINIC | Facility: CLINIC | Age: 60
End: 2020-07-13

## 2020-07-13 DIAGNOSIS — Z20.828 EXPOSURE TO SARS-ASSOCIATED CORONAVIRUS: ICD-10-CM

## 2020-07-13 PROCEDURE — U0003 INFECTIOUS AGENT DETECTION BY NUCLEIC ACID (DNA OR RNA); SEVERE ACUTE RESPIRATORY SYNDROME CORONAVIRUS 2 (SARS-COV-2) (CORONAVIRUS DISEASE [COVID-19]), AMPLIFIED PROBE TECHNIQUE, MAKING USE OF HIGH THROUGHPUT TECHNOLOGIES AS DESCRIBED BY CMS-2020-01-R: HCPCS

## 2020-07-13 NOTE — TELEPHONE ENCOUNTER
Pt just had a second test this am for the Covid virus-he has an appt tomorrow with you  For a RTN    Can he come in or does he need a virtual until the results come back?

## 2020-07-14 ENCOUNTER — TELEMEDICINE (OUTPATIENT)
Dept: INTERNAL MEDICINE CLINIC | Facility: CLINIC | Age: 60
End: 2020-07-14
Payer: COMMERCIAL

## 2020-07-14 DIAGNOSIS — F41.1 GENERALIZED ANXIETY DISORDER: ICD-10-CM

## 2020-07-14 DIAGNOSIS — Z13.0 SCREENING FOR DEFICIENCY ANEMIA: ICD-10-CM

## 2020-07-14 DIAGNOSIS — Z13.29 SCREENING FOR THYROID DISORDER: ICD-10-CM

## 2020-07-14 DIAGNOSIS — Z12.5 SCREENING FOR PROSTATE CANCER: ICD-10-CM

## 2020-07-14 DIAGNOSIS — E55.9 VITAMIN D DEFICIENCY: ICD-10-CM

## 2020-07-14 DIAGNOSIS — G47.33 OBSTRUCTIVE SLEEP APNEA: ICD-10-CM

## 2020-07-14 DIAGNOSIS — Z13.1 SCREENING FOR DIABETES MELLITUS (DM): ICD-10-CM

## 2020-07-14 DIAGNOSIS — E78.2 MIXED HYPERLIPIDEMIA: ICD-10-CM

## 2020-07-14 DIAGNOSIS — U07.1 COVID-19 VIRUS DETECTED: ICD-10-CM

## 2020-07-14 DIAGNOSIS — I10 ESSENTIAL HYPERTENSION: Primary | ICD-10-CM

## 2020-07-14 DIAGNOSIS — Z13.220 SCREENING FOR LIPID DISORDERS: ICD-10-CM

## 2020-07-14 DIAGNOSIS — C43.9 MALIGNANT MELANOMA OF SKIN (HCC): ICD-10-CM

## 2020-07-14 PROBLEM — Z12.11 SCREEN FOR COLON CANCER: Status: RESOLVED | Noted: 2020-04-30 | Resolved: 2020-07-14

## 2020-07-14 PROCEDURE — 99214 OFFICE O/P EST MOD 30 MIN: CPT | Performed by: INTERNAL MEDICINE

## 2020-07-14 PROCEDURE — 1036F TOBACCO NON-USER: CPT | Performed by: INTERNAL MEDICINE

## 2020-07-14 PROCEDURE — 3078F DIAST BP <80 MM HG: CPT | Performed by: INTERNAL MEDICINE

## 2020-07-14 PROCEDURE — 3074F SYST BP LT 130 MM HG: CPT | Performed by: INTERNAL MEDICINE

## 2020-07-14 NOTE — PROGRESS NOTES
Virtual Regular Visit      Assessment/Plan:    Problem List Items Addressed This Visit        Respiratory    Obstructive sleep apnea       Cardiovascular and Mediastinum    Essential hypertension - Primary    Relevant Orders    Comprehensive metabolic panel    Microalbumin / creatinine urine ratio       Musculoskeletal and Integument    Malignant melanoma of skin ([facility] [address] )       Other    Generalized anxiety disorder    Mixed hyperlipidemia    Relevant Orders    LDL cholesterol, direct    Triglycerides    Vitamin D deficiency    Relevant Orders    Vitamin D 25 hydroxy      Other Visit Diagnoses     Screening for prostate cancer        Relevant Orders    PSA, Total Screen    Screening for lipid disorders        Screening for diabetes mellitus (DM)        Screening for thyroid disorder        Screening for deficiency anemia        COVID-19 virus detected                   Reason for visit is   Chief Complaint   Patient presents with    Virtual Brief Visit     facetime covid ruled out, pt feels better today, no s/s        Encounter provider Catalina Ba DO    Provider located at [address] [place] [phone]      Recent Visits  Date Type Provider Dept   07/13/20 Telephone MARÍA ELENA Hightower Pg recent visits within past 7 days and meeting all other requirements     Today's Visits  Date Type Provider Dept   07/14/20 Telemedicine DO Fab Dangelo today's visits and meeting all other requirements     Future Appointments  No visits were found meeting these conditions  Showing future appointments within next 150 days and meeting all other requirements        The patient was identified by name and date of birth  Kamini Alexander was informed that this is a telemedicine visit and that the visit is being conducted through [facility] and patient was informed that this is a secure, HIPAA-compliant platform   He agrees to proceed     My office door was closed  No one else was in the room  He acknowledged consent and understanding of privacy and security of the video platform  The patient has agreed to participate and understands they can discontinue the visit at any time  Patient is aware this is a billable service  Subjective  Kamini Alexander is a 63 y o  male    WM RTC for f/u htn, hyperlipidemia, etc  Underwent successful gastric sleeve and is down about 140#  Doing ok and recently had symptomatic COVID-19 with f/u testing negative time one with second f/u pending  Remains active w/o difficulty and no falls  ELIDIA is stable  Skin cancer is in remission  BRITTANI is controlled  Due for labs  Past Medical History:   Diagnosis Date    Arthritis     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[address] )     Cancer (HCC)     of back    Generalized anxiety disorder     Hypertension     Obesity     Primary osteoarthritis of both knees        Past Surgical History:   Procedure Laterality Date    JOINT REPLACEMENT Bilateral     KNEE ARTHROSCOPY      Therapeutic; Last Assessed: 10/30/2015    KNEE SURGERY Bilateral 2006, 2008    Knee replacement    MOHS SURGERY      Mohs Micrographic Surg Trunk First Stage, up to 5 Specimens    TONSILLECTOMY AND ADENOIDECTOMY      UVULECTOMY      Last Assessed: 10/30/2015       Current Outpatient Medications   Medication Sig Dispense Refill    B COMPLEX VITAMINS PO Take 1 tablet by mouth daily      Calcium Carbonate-Vitamin D 600-400 MG-UNIT per tablet Take 1 tablet by mouth      escitalopram (LEXAPRO) 20 mg tablet Take 1 tablet (20 mg total) by mouth daily 30 tablet 5    ketoconazole (NIZORAL) 2 % shampoo       lisinopril (ZESTRIL) 10 mg tablet Take 1 tablet (10 mg total) by mouth daily 30 tablet 5    Na Sulfate-K Sulfate-Mg Sulf (Suprep Bowel Prep Kit) 17 5-3 13-1 6 GM/177ML SOLN day before, use 6 oz bottle   4 hours before procedure, take 2nd dose 2 Bottle 0    pediatric multivitamin-iron (POLY-VI-SOL WITH IRON) solution Take 1 tablet by mouth 2 (two) times a day      zolpidem (AMBIEN) 10 mg tablet Take 1 tablet (10 mg total) by mouth daily at bedtime as needed for sleep 30 tablet 0     No current facility-administered medications for this visit  Allergies   Allergen Reactions    Penicillin V Anaphylaxis       Review of Systems   Constitutional: Negative for activity change, chills, diaphoresis, fatigue and fever  HENT: Negative  Eyes: Negative for visual disturbance  Respiratory: Negative for cough, chest tightness, shortness of breath and wheezing  Cardiovascular: Negative for chest pain, palpitations and leg swelling  Gastrointestinal: Negative for abdominal pain, constipation, diarrhea, nausea and vomiting  Endocrine: Negative for cold intolerance and heat intolerance  Genitourinary: Negative for difficulty urinating, dysuria and frequency  Musculoskeletal: Negative for arthralgias, gait problem and myalgias  Neurological: Negative for dizziness, seizures, syncope, weakness, light-headedness and headaches  Psychiatric/Behavioral: Negative for confusion, dysphoric mood and sleep disturbance  The patient is not nervous/anxious  Video Exam    Vitals:       Physical Exam   Constitutional: He is oriented to person, place, and time  He appears well-developed and well-nourished  No distress  HENT:   Head: Normocephalic and atraumatic  Eyes: Pupils are equal, round, and reactive to light  Conjunctivae and EOM are normal    Pulmonary/Chest: Effort normal  No respiratory distress  Abdominal: Soft  Bowel sounds are normal  He exhibits no distension  There is no tenderness  Musculoskeletal: He exhibits no edema  Neurological: He is alert and oriented to person, place, and time  Psychiatric: He has a normal mood and affect  His behavior is normal  Judgment and thought content normal    Nursing note reviewed       A/P: Doing well and will check labs  Reports BP at recent bariatric visit was good  Discussed BMI and will continue to f/u with bariatrics  Will continue  Current treatment and may need to adjust meds if further wt loss occurs  Await second COVID-19 test and briefly discussed antibody donation  RTC four months for routine  I spent 25 minutes directly with the patient during this visit      VIRTUAL VISIT DISCLAIMER    Radha Menendez acknowledges that he has consented to an online visit or consultation  He understands that the online visit is based solely on information provided by him, and that, in the absence of a face-to-face physical evaluation by the physician, the diagnosis he receives is both limited and provisional in terms of accuracy and completeness  This is not intended to replace a full medical face-to-face evaluation by the physician  Radha Menendez understands and accepts these terms

## 2020-07-14 NOTE — PATIENT INSTRUCTIONS

## 2020-07-19 LAB — SARS-COV-2 RNA SPEC QL NAA+PROBE: NOT DETECTED

## 2020-07-27 DIAGNOSIS — G47.9 SLEEP DISTURBANCE: ICD-10-CM

## 2020-07-27 RX ORDER — ZOLPIDEM TARTRATE 10 MG/1
10 TABLET ORAL
Qty: 30 TABLET | Refills: 0 | Status: SHIPPED | OUTPATIENT
Start: 2020-07-27 | End: 2020-08-26 | Stop reason: SDUPTHER

## 2020-07-31 DIAGNOSIS — F41.1 GENERALIZED ANXIETY DISORDER: ICD-10-CM

## 2020-07-31 RX ORDER — ESCITALOPRAM OXALATE 20 MG/1
20 TABLET ORAL DAILY
Qty: 30 TABLET | Refills: 5 | Status: SHIPPED | OUTPATIENT
Start: 2020-07-31 | End: 2021-02-08 | Stop reason: SDUPTHER

## 2020-08-26 DIAGNOSIS — G47.9 SLEEP DISTURBANCE: ICD-10-CM

## 2020-08-26 RX ORDER — ZOLPIDEM TARTRATE 10 MG/1
10 TABLET ORAL
Qty: 30 TABLET | Refills: 0 | Status: SHIPPED | OUTPATIENT
Start: 2020-08-26 | End: 2020-09-24 | Stop reason: SDUPTHER

## 2020-09-24 DIAGNOSIS — G47.9 SLEEP DISTURBANCE: ICD-10-CM

## 2020-09-24 RX ORDER — ZOLPIDEM TARTRATE 10 MG/1
10 TABLET ORAL
Qty: 30 TABLET | Refills: 0 | Status: SHIPPED | OUTPATIENT
Start: 2020-09-24 | End: 2020-10-26 | Stop reason: SDUPTHER

## 2020-10-20 DIAGNOSIS — I10 HYPERTENSION, UNSPECIFIED TYPE: ICD-10-CM

## 2020-10-20 RX ORDER — LISINOPRIL 10 MG/1
10 TABLET ORAL DAILY
Qty: 30 TABLET | Refills: 5 | Status: SHIPPED | OUTPATIENT
Start: 2020-10-20 | End: 2021-04-27 | Stop reason: SDUPTHER

## 2020-10-26 DIAGNOSIS — G47.9 SLEEP DISTURBANCE: ICD-10-CM

## 2020-10-26 RX ORDER — ZOLPIDEM TARTRATE 10 MG/1
10 TABLET ORAL
Qty: 30 TABLET | Refills: 0 | Status: SHIPPED | OUTPATIENT
Start: 2020-10-26 | End: 2020-11-24 | Stop reason: SDUPTHER

## 2020-11-16 ENCOUNTER — OFFICE VISIT (OUTPATIENT)
Dept: INTERNAL MEDICINE CLINIC | Facility: CLINIC | Age: 60
End: 2020-11-16
Payer: COMMERCIAL

## 2020-11-16 VITALS
HEIGHT: 71 IN | SYSTOLIC BLOOD PRESSURE: 122 MMHG | OXYGEN SATURATION: 97 % | BODY MASS INDEX: 38.5 KG/M2 | DIASTOLIC BLOOD PRESSURE: 70 MMHG | HEART RATE: 64 BPM | WEIGHT: 275 LBS | TEMPERATURE: 97.5 F | RESPIRATION RATE: 18 BRPM

## 2020-11-16 DIAGNOSIS — E78.2 MIXED HYPERLIPIDEMIA: ICD-10-CM

## 2020-11-16 DIAGNOSIS — Z13.0 SCREENING FOR DEFICIENCY ANEMIA: ICD-10-CM

## 2020-11-16 DIAGNOSIS — Z12.5 SCREENING FOR PROSTATE CANCER: ICD-10-CM

## 2020-11-16 DIAGNOSIS — Z13.31 NEGATIVE DEPRESSION SCREENING: ICD-10-CM

## 2020-11-16 DIAGNOSIS — E55.9 VITAMIN D DEFICIENCY: ICD-10-CM

## 2020-11-16 DIAGNOSIS — Z13.220 SCREENING FOR LIPID DISORDERS: ICD-10-CM

## 2020-11-16 DIAGNOSIS — Z13.1 SCREENING FOR DIABETES MELLITUS (DM): ICD-10-CM

## 2020-11-16 DIAGNOSIS — Z11.4 SCREENING FOR HIV (HUMAN IMMUNODEFICIENCY VIRUS): ICD-10-CM

## 2020-11-16 DIAGNOSIS — Z98.84 S/P GASTRIC BYPASS: ICD-10-CM

## 2020-11-16 DIAGNOSIS — I10 ESSENTIAL HYPERTENSION: Primary | ICD-10-CM

## 2020-11-16 DIAGNOSIS — Z13.29 SCREENING FOR THYROID DISORDER: ICD-10-CM

## 2020-11-16 DIAGNOSIS — Z23 ENCOUNTER FOR VACCINATION: ICD-10-CM

## 2020-11-16 DIAGNOSIS — M17.0 PRIMARY OSTEOARTHRITIS OF BOTH KNEES: ICD-10-CM

## 2020-11-16 DIAGNOSIS — C43.9 MALIGNANT MELANOMA OF SKIN (HCC): ICD-10-CM

## 2020-11-16 DIAGNOSIS — F41.1 GENERALIZED ANXIETY DISORDER: ICD-10-CM

## 2020-11-16 PROCEDURE — 3078F DIAST BP <80 MM HG: CPT | Performed by: INTERNAL MEDICINE

## 2020-11-16 PROCEDURE — 99214 OFFICE O/P EST MOD 30 MIN: CPT | Performed by: INTERNAL MEDICINE

## 2020-11-16 PROCEDURE — 3008F BODY MASS INDEX DOCD: CPT | Performed by: INTERNAL MEDICINE

## 2020-11-16 PROCEDURE — 1036F TOBACCO NON-USER: CPT | Performed by: INTERNAL MEDICINE

## 2020-11-16 PROCEDURE — 3725F SCREEN DEPRESSION PERFORMED: CPT | Performed by: INTERNAL MEDICINE

## 2020-11-16 PROCEDURE — 3074F SYST BP LT 130 MM HG: CPT | Performed by: INTERNAL MEDICINE

## 2020-11-24 DIAGNOSIS — G47.9 SLEEP DISTURBANCE: ICD-10-CM

## 2020-11-24 RX ORDER — ZOLPIDEM TARTRATE 10 MG/1
10 TABLET ORAL
Qty: 30 TABLET | Refills: 0 | Status: SHIPPED | OUTPATIENT
Start: 2020-11-24 | End: 2020-12-28 | Stop reason: SDUPTHER

## 2020-12-28 DIAGNOSIS — G47.9 SLEEP DISTURBANCE: ICD-10-CM

## 2020-12-28 RX ORDER — ZOLPIDEM TARTRATE 10 MG/1
10 TABLET ORAL
Qty: 30 TABLET | Refills: 0 | Status: SHIPPED | OUTPATIENT
Start: 2020-12-28 | End: 2021-01-27 | Stop reason: SDUPTHER

## 2021-01-27 DIAGNOSIS — G47.9 SLEEP DISTURBANCE: ICD-10-CM

## 2021-01-28 RX ORDER — ZOLPIDEM TARTRATE 10 MG/1
10 TABLET ORAL
Qty: 30 TABLET | Refills: 0 | Status: SHIPPED | OUTPATIENT
Start: 2021-01-28 | End: 2021-02-26 | Stop reason: SDUPTHER

## 2021-02-08 DIAGNOSIS — F41.1 GENERALIZED ANXIETY DISORDER: ICD-10-CM

## 2021-02-08 RX ORDER — ESCITALOPRAM OXALATE 20 MG/1
20 TABLET ORAL DAILY
Qty: 30 TABLET | Refills: 5 | Status: SHIPPED | OUTPATIENT
Start: 2021-02-08 | End: 2021-08-31 | Stop reason: SDUPTHER

## 2021-02-26 DIAGNOSIS — G47.9 SLEEP DISTURBANCE: ICD-10-CM

## 2021-02-26 RX ORDER — ZOLPIDEM TARTRATE 10 MG/1
10 TABLET ORAL
Qty: 30 TABLET | Refills: 0 | Status: SHIPPED | OUTPATIENT
Start: 2021-02-26 | End: 2021-03-29 | Stop reason: SDUPTHER

## 2021-03-11 ENCOUNTER — APPOINTMENT (OUTPATIENT)
Dept: LAB | Facility: CLINIC | Age: 61
End: 2021-03-11
Payer: COMMERCIAL

## 2021-03-11 DIAGNOSIS — Z12.5 SCREENING FOR PROSTATE CANCER: ICD-10-CM

## 2021-03-11 DIAGNOSIS — E55.9 VITAMIN D DEFICIENCY: ICD-10-CM

## 2021-03-11 DIAGNOSIS — F41.1 GENERALIZED ANXIETY DISORDER: ICD-10-CM

## 2021-03-11 DIAGNOSIS — E78.2 MIXED HYPERLIPIDEMIA: ICD-10-CM

## 2021-03-11 DIAGNOSIS — Z11.4 SCREENING FOR HIV (HUMAN IMMUNODEFICIENCY VIRUS): ICD-10-CM

## 2021-03-11 DIAGNOSIS — I10 ESSENTIAL HYPERTENSION: ICD-10-CM

## 2021-03-11 DIAGNOSIS — R97.20 ELEVATED PSA: Primary | ICD-10-CM

## 2021-03-11 LAB
25(OH)D3 SERPL-MCNC: 29.6 NG/ML (ref 30–100)
ALBUMIN SERPL BCP-MCNC: 3.6 G/DL (ref 3.5–5)
ALP SERPL-CCNC: 95 U/L (ref 46–116)
ALT SERPL W P-5'-P-CCNC: 27 U/L (ref 12–78)
ANION GAP SERPL CALCULATED.3IONS-SCNC: 3 MMOL/L (ref 4–13)
AST SERPL W P-5'-P-CCNC: 11 U/L (ref 5–45)
BASOPHILS # BLD AUTO: 0.07 THOUSANDS/ΜL (ref 0–0.1)
BASOPHILS NFR BLD AUTO: 1 % (ref 0–1)
BILIRUB SERPL-MCNC: 0.89 MG/DL (ref 0.2–1)
BUN SERPL-MCNC: 23 MG/DL (ref 5–25)
CALCIUM SERPL-MCNC: 8.7 MG/DL (ref 8.3–10.1)
CHLORIDE SERPL-SCNC: 109 MMOL/L (ref 100–108)
CHOLEST SERPL-MCNC: 175 MG/DL (ref 50–200)
CO2 SERPL-SCNC: 30 MMOL/L (ref 21–32)
CREAT SERPL-MCNC: 0.7 MG/DL (ref 0.6–1.3)
CREAT UR-MCNC: 129 MG/DL
EOSINOPHIL # BLD AUTO: 0.14 THOUSAND/ΜL (ref 0–0.61)
EOSINOPHIL NFR BLD AUTO: 3 % (ref 0–6)
ERYTHROCYTE [DISTWIDTH] IN BLOOD BY AUTOMATED COUNT: 13 % (ref 11.6–15.1)
GFR SERPL CREATININE-BSD FRML MDRD: 103 ML/MIN/1.73SQ M
GLUCOSE P FAST SERPL-MCNC: 83 MG/DL (ref 65–99)
HCT VFR BLD AUTO: 46.3 % (ref 36.5–49.3)
HDLC SERPL-MCNC: 58 MG/DL
HGB BLD-MCNC: 15 G/DL (ref 12–17)
IMM GRANULOCYTES # BLD AUTO: 0.01 THOUSAND/UL (ref 0–0.2)
IMM GRANULOCYTES NFR BLD AUTO: 0 % (ref 0–2)
LDLC SERPL CALC-MCNC: 102 MG/DL (ref 0–100)
LDLC SERPL DIRECT ASSAY-MCNC: 105 MG/DL (ref 0–100)
LYMPHOCYTES # BLD AUTO: 1.56 THOUSANDS/ΜL (ref 0.6–4.47)
LYMPHOCYTES NFR BLD AUTO: 31 % (ref 14–44)
MCH RBC QN AUTO: 28.8 PG (ref 26.8–34.3)
MCHC RBC AUTO-ENTMCNC: 32.4 G/DL (ref 31.4–37.4)
MCV RBC AUTO: 89 FL (ref 82–98)
MICROALBUMIN UR-MCNC: 6.7 MG/L (ref 0–20)
MICROALBUMIN/CREAT 24H UR: 5 MG/G CREATININE (ref 0–30)
MONOCYTES # BLD AUTO: 0.51 THOUSAND/ΜL (ref 0.17–1.22)
MONOCYTES NFR BLD AUTO: 10 % (ref 4–12)
NEUTROPHILS # BLD AUTO: 2.7 THOUSANDS/ΜL (ref 1.85–7.62)
NEUTS SEG NFR BLD AUTO: 55 % (ref 43–75)
NRBC BLD AUTO-RTO: 0 /100 WBCS
PLATELET # BLD AUTO: 166 THOUSANDS/UL (ref 149–390)
PMV BLD AUTO: 9.7 FL (ref 8.9–12.7)
POTASSIUM SERPL-SCNC: 4.2 MMOL/L (ref 3.5–5.3)
PROT SERPL-MCNC: 6.4 G/DL (ref 6.4–8.2)
PSA SERPL-MCNC: 4.4 NG/ML (ref 0–4)
RBC # BLD AUTO: 5.2 MILLION/UL (ref 3.88–5.62)
SODIUM SERPL-SCNC: 142 MMOL/L (ref 136–145)
TRIGL SERPL-MCNC: 73 MG/DL
TSH SERPL DL<=0.05 MIU/L-ACNC: 1.89 UIU/ML (ref 0.36–3.74)
WBC # BLD AUTO: 4.99 THOUSAND/UL (ref 4.31–10.16)

## 2021-03-11 PROCEDURE — 36415 COLL VENOUS BLD VENIPUNCTURE: CPT

## 2021-03-11 PROCEDURE — 85025 COMPLETE CBC W/AUTO DIFF WBC: CPT

## 2021-03-11 PROCEDURE — 82570 ASSAY OF URINE CREATININE: CPT | Performed by: INTERNAL MEDICINE

## 2021-03-11 PROCEDURE — 80053 COMPREHEN METABOLIC PANEL: CPT

## 2021-03-11 PROCEDURE — 84443 ASSAY THYROID STIM HORMONE: CPT

## 2021-03-11 PROCEDURE — 83721 ASSAY OF BLOOD LIPOPROTEIN: CPT

## 2021-03-11 PROCEDURE — 87389 HIV-1 AG W/HIV-1&-2 AB AG IA: CPT

## 2021-03-11 PROCEDURE — G0103 PSA SCREENING: HCPCS

## 2021-03-11 PROCEDURE — 82043 UR ALBUMIN QUANTITATIVE: CPT | Performed by: INTERNAL MEDICINE

## 2021-03-11 PROCEDURE — 82306 VITAMIN D 25 HYDROXY: CPT

## 2021-03-11 PROCEDURE — 80061 LIPID PANEL: CPT

## 2021-03-12 LAB — HIV 1+2 AB+HIV1 P24 AG SERPL QL IA: NORMAL

## 2021-03-16 ENCOUNTER — OFFICE VISIT (OUTPATIENT)
Dept: INTERNAL MEDICINE CLINIC | Facility: CLINIC | Age: 61
End: 2021-03-16
Payer: COMMERCIAL

## 2021-03-16 VITALS
RESPIRATION RATE: 18 BRPM | BODY MASS INDEX: 42 KG/M2 | SYSTOLIC BLOOD PRESSURE: 134 MMHG | HEIGHT: 71 IN | WEIGHT: 300 LBS | TEMPERATURE: 98.3 F | DIASTOLIC BLOOD PRESSURE: 80 MMHG | HEART RATE: 70 BPM | OXYGEN SATURATION: 98 %

## 2021-03-16 DIAGNOSIS — E55.9 VITAMIN D DEFICIENCY: ICD-10-CM

## 2021-03-16 DIAGNOSIS — Z12.5 SCREENING FOR PROSTATE CANCER: ICD-10-CM

## 2021-03-16 DIAGNOSIS — Z13.29 SCREENING FOR THYROID DISORDER: ICD-10-CM

## 2021-03-16 DIAGNOSIS — Z98.84 S/P GASTRIC BYPASS: ICD-10-CM

## 2021-03-16 DIAGNOSIS — G47.33 OBSTRUCTIVE SLEEP APNEA: ICD-10-CM

## 2021-03-16 DIAGNOSIS — M17.0 PRIMARY OSTEOARTHRITIS OF BOTH KNEES: ICD-10-CM

## 2021-03-16 DIAGNOSIS — Z13.1 SCREENING FOR DIABETES MELLITUS (DM): ICD-10-CM

## 2021-03-16 DIAGNOSIS — E78.2 MIXED HYPERLIPIDEMIA: ICD-10-CM

## 2021-03-16 DIAGNOSIS — Z13.0 SCREENING FOR DEFICIENCY ANEMIA: ICD-10-CM

## 2021-03-16 DIAGNOSIS — F41.1 GENERALIZED ANXIETY DISORDER: ICD-10-CM

## 2021-03-16 DIAGNOSIS — I10 ESSENTIAL HYPERTENSION: Primary | ICD-10-CM

## 2021-03-16 DIAGNOSIS — Z13.220 SCREENING FOR LIPID DISORDERS: ICD-10-CM

## 2021-03-16 PROCEDURE — 3075F SYST BP GE 130 - 139MM HG: CPT | Performed by: INTERNAL MEDICINE

## 2021-03-16 PROCEDURE — 99214 OFFICE O/P EST MOD 30 MIN: CPT | Performed by: INTERNAL MEDICINE

## 2021-03-16 PROCEDURE — 1036F TOBACCO NON-USER: CPT | Performed by: INTERNAL MEDICINE

## 2021-03-16 PROCEDURE — 3008F BODY MASS INDEX DOCD: CPT | Performed by: INTERNAL MEDICINE

## 2021-03-16 PROCEDURE — 3079F DIAST BP 80-89 MM HG: CPT | Performed by: INTERNAL MEDICINE

## 2021-03-16 NOTE — PROGRESS NOTES
BMI Counseling: Body mass index is 41 84 kg/m²  The BMI is above normal  Nutrition recommendations include decreasing portion sizes and encouraging healthy choices of fruits and vegetables  Exercise recommendations include moderate physical activity 150 minutes/week  No pharmacotherapy was ordered  Assessment/Plan:  Problem List Items Addressed This Visit        Respiratory    Obstructive sleep apnea       Cardiovascular and Mediastinum    Essential hypertension - Primary    Relevant Orders    Comprehensive metabolic panel    Microalbumin / creatinine urine ratio       Musculoskeletal and Integument    Primary osteoarthritis of both knees       Other    Generalized anxiety disorder    Relevant Orders    TSH, 3rd generation with Free T4 reflex    Mixed hyperlipidemia    Relevant Orders    Lipid Panel with Direct LDL reflex    TSH, 3rd generation with Free T4 reflex    S/P gastric bypass    Relevant Orders    CBC and differential    TSH, 3rd generation    Vitamin A    Vitamin B1, whole blood    Vitamin B12/Folate, Serum Panel    Vitamin B6    Vitamin D 25 hydroxy    Iron Panel (Includes Ferritin, Iron Sat%, Iron, and TIBC)    Vitamin D deficiency      Other Visit Diagnoses     Screening for prostate cancer        Relevant Orders    PSA, Total Screen    Screening for lipid disorders        Screening for diabetes mellitus (DM)        Screening for thyroid disorder        Screening for deficiency anemia               Diagnoses and all orders for this visit:    Essential hypertension  -     Comprehensive metabolic panel; Future  -     Microalbumin / creatinine urine ratio    Mixed hyperlipidemia  -     Lipid Panel with Direct LDL reflex; Future  -     TSH, 3rd generation with Free T4 reflex; Future    Generalized anxiety disorder  -     TSH, 3rd generation with Free T4 reflex; Future    Vitamin D deficiency    S/P gastric bypass  -     CBC and differential; Future  -     TSH, 3rd generation;  Future  -     Vitamin A; Future  -     Vitamin B1, whole blood; Future  -     Vitamin B12/Folate, Serum Panel; Future  -     Vitamin B6; Future  -     Vitamin D 25 hydroxy; Future  -     Iron Panel (Includes Ferritin, Iron Sat%, Iron, and TIBC); Future    Obstructive sleep apnea    Primary osteoarthritis of both knees    Screening for prostate cancer  -     PSA, Total Screen; Future    Screening for lipid disorders    Screening for diabetes mellitus (DM)    Screening for thyroid disorder    Screening for deficiency anemia    Other orders  -     Cancel: CBC and differential; Future  -     Cancel: Comprehensive metabolic panel; Future        No problem-specific Assessment & Plan notes found for this encounter  A/P: Doing ok and will check labs  Discussed BMI and will give instruction on diet and exercises and is to f/u with bariatrics  Will try trazodone  Continue current treatment and RTC four months for routine  Subjective:      Patient ID: Ronan Gordon is a 62 y o  male  WM RTC for f/u htn, hyperlipidemia, etc  Doing ok and no new issues, but reports not being able to sleep more than several hours at a time    Is s/p gastric bypass and is doing ok  Tolerating his vitamins, supplements, and no problems swallowing  Remains active w/o difficulty and no falls  ELIDIA and DJD pain  is better with the wt loss, but not resolved  Melanoma is in remission  BRITTANI is controlled  Due for labs  The following portions of the patient's history were reviewed and updated as appropriate:   He has a past medical history of Arthritis, 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[facility] [address] ), Cancer ([facility] [address] ), Generalized anxiety disorder, Hypertension, Obesity, and Primary osteoarthritis of both knees  ,  does not have any pertinent problems on file  ,   has a past surgical history that includes Knee surgery (Bilateral, 2006, 2008); Knee arthroscopy; Mohs surgery; Tonsillectomy and adenoidectomy; Uvulectomy; and Joint replacement (Bilateral)  ,  family history includes Arthritis in his maternal grandmother; Cancer in his mother; Heart disease in his father, maternal grandmother, other, and paternal grandmother; Hypotension in his mother; Liver cancer in his other and paternal grandfather; Lung cancer in his other; Other in his brother; Prostate cancer in his family and other; Stroke in his maternal grandfather, maternal grandmother, and mother; Thyroid cancer in his mother  ,   reports that he has never smoked  He has never used smokeless tobacco  He reports current alcohol use  He reports that he does not use drugs  ,  is allergic to penicillin v   Current Outpatient Medications   Medication Sig Dispense Refill    Calcium Carbonate-Vitamin D 600-400 MG-UNIT per tablet Take 1 tablet by mouth      escitalopram (LEXAPRO) 20 mg tablet Take 1 tablet (20 mg total) by mouth daily 30 tablet 5    ketoconazole (NIZORAL) 2 % shampoo       lisinopril (ZESTRIL) 10 mg tablet Take 1 tablet (10 mg total) by mouth daily 30 tablet 5    pediatric multivitamin-iron (POLY-VI-SOL WITH IRON) solution Take 1 tablet by mouth 2 (two) times a day      zolpidem (AMBIEN) 10 mg tablet Take 1 tablet (10 mg total) by mouth daily at bedtime as needed for sleep 30 tablet 0    Na Sulfate-K Sulfate-Mg Sulf (Suprep Bowel Prep Kit) 17 5-3 13-1 6 GM/177ML SOLN day before, use 6 oz bottle  4 hours before procedure, take 2nd dose 2 Bottle 0     No current facility-administered medications for this visit  Review of Systems   Constitutional: Negative for activity change, chills, diaphoresis, fatigue and fever  HENT: Negative  Eyes: Negative for visual disturbance  Respiratory: Negative for cough, chest tightness, shortness of breath and wheezing  Cardiovascular: Negative for chest pain, palpitations and leg swelling  Gastrointestinal: Negative for abdominal pain, constipation, diarrhea, nausea and vomiting  Endocrine: Negative for cold intolerance and heat intolerance     Genitourinary: Negative for difficulty urinating, dysuria and frequency  Musculoskeletal: Negative for arthralgias, gait problem and myalgias  Neurological: Negative for dizziness, seizures, syncope, weakness, light-headedness and headaches  Psychiatric/Behavioral: Negative for confusion, dysphoric mood and sleep disturbance  The patient is not nervous/anxious  PHQ-9 Depression Screening    PHQ-9:   Frequency of the following problems over the past two weeks:            Objective:  Vitals:    03/16/21 1536   BP: 134/80   BP Location: Left arm   Patient Position: Sitting   Cuff Size: Large   Pulse: 70   Resp: 18   Temp: 98 3 °F (36 8 °C)   TempSrc: Temporal   SpO2: 98%   Weight: 136 kg (300 lb)   Height: 5' 11" (1 803 m)     Body mass index is 41 84 kg/m²  Physical Exam  Vitals signs and nursing note reviewed  Constitutional:       General: He is not in acute distress  Appearance: Normal appearance  He is not ill-appearing  HENT:      Head: Normocephalic and atraumatic  Mouth/Throat:      Mouth: Mucous membranes are moist    Eyes:      Extraocular Movements: Extraocular movements intact  Conjunctiva/sclera: Conjunctivae normal       Pupils: Pupils are equal, round, and reactive to light  Neck:      Musculoskeletal: Neck supple  Vascular: No carotid bruit  Cardiovascular:      Rate and Rhythm: Normal rate and regular rhythm  Heart sounds: Normal heart sounds  Pulmonary:      Effort: Pulmonary effort is normal  No respiratory distress  Breath sounds: Normal breath sounds  No wheezing or rales  Abdominal:      General: Bowel sounds are normal  There is no distension  Palpations: Abdomen is soft  Tenderness: There is no abdominal tenderness  Musculoskeletal:      Right lower leg: No edema  Left lower leg: No edema  Neurological:      General: No focal deficit present  Mental Status: He is alert and oriented to person, place, and time  Mental status is at baseline  Psychiatric:         Mood and Affect: Mood normal          Behavior: Behavior normal          Thought Content: Thought content normal          Judgment: Judgment normal          BMI Counseling: Body mass index is 41 84 kg/m²  The BMI is above normal  Nutrition recommendations include reducing portion sizes, decreasing overall calorie intake, reducing intake of saturated fat and trans fat and reducing intake of cholesterol  Exercise recommendations include moderate aerobic physical activity for 150 minutes/week

## 2021-03-16 NOTE — PATIENT INSTRUCTIONS
Chronic Hypertension   AMBULATORY CARE:   Hypertension  is high blood pressure  Your blood pressure is the force of your blood moving against the walls of your arteries  Hypertension causes your blood pressure to get so high that your heart has to work much harder than normal  This can damage your heart  Even if you have hypertension for years, lifestyle changes, medicines, or both can help bring your blood pressure to normal   Call 911 for any of the following:   · You have chest pain  · You have any of the following signs of a heart attack:      ? Squeezing, pressure, or pain in your chest    ? You may  also have any of the following:     § Discomfort or pain in your back, neck, jaw, stomach, or arm    § Shortness of breath    § Nausea or vomiting    § Lightheadedness or a sudden cold sweat    · You become confused or have difficulty speaking  · You suddenly feel lightheaded or have trouble breathing  Seek care immediately if:   · You have a severe headache or vision loss  · You have weakness in an arm or leg  Contact your healthcare provider if:   · You feel faint, dizzy, confused, or drowsy  · You have been taking your blood pressure medicine but your pressure is higher than your provider says it should be  · You have questions or concerns about your condition or care  Treatment for chronic hypertension  may include medicine to lower your blood pressure and cholesterol levels  A low cholesterol level helps prevent heart disease and makes it easier to control your blood pressure  Heart disease can make your blood pressure harder to control  You may also need to make lifestyle changes  What you need to know about the stages of hypertension:       · Normal blood pressure is 119/79 or lower   Your healthcare provider may only check your blood pressure each year if it stays at a normal level  · Elevated blood pressure is 120/79 to 129/79   This is sometimes called prehypertension   Your healthcare provider may suggest lifestyle changes to help lower your blood pressure to a normal level  He or she may then check it again in 3 to 6 months  · Stage 1 hypertension is 130/80  to 139/89   Your provider may recommend lifestyle changes, medication, and checks every 3 to 6 months until your blood pressure is controlled  · Stage 2 hypertension is 140/90 or higher   Your provider will recommend lifestyle changes and have you take 2 kinds of hypertension medicines  You will also need to have your blood pressure checked monthly until it is controlled  Manage chronic hypertension:   · Check your blood pressure at home  Avoid smoking, caffeine, and exercise at least 30 minutes before checking your blood pressure  Sit and rest for 5 minutes before you take your blood pressure  Extend your arm and support it on a flat surface  Your arm should be at the same level as your heart  Follow the directions that came with your blood pressure monitor  Check your blood pressure 2 times, 1 minute apart, before you take your medicine in the morning  Also check your blood pressure before your evening meal  Keep a record of your readings and bring it to your follow-up visits  Ask your healthcare provider what your blood pressure should be  · Manage any other health conditions you have  Health conditions such as diabetes can increase your risk for hypertension  Follow your healthcare provider's instructions and take all your medicines as directed  Talk to your healthcare provider about any new health conditions you have recently developed  · Ask about all medicines  Certain medicines can increase your blood pressure  Examples include oral birth control pills, decongestants, herbal supplements, and NSAIDs, such as ibuprofen  Your healthcare provider can tell you which medicines are safe for you to take  This includes prescription and over-the-counter medicines      Lifestyle changes you can make to lower your blood pressure: Your provider may want you to make more lifestyle changes if you are having trouble controlling your blood pressure  This may feel difficult over time, especially if you think you are making good changes but your pressure is still high  It might help to focus on one new change at a time  For example, try to add 1 more day of exercise, or exercise for an extra 10 minutes on 2 days  Small changes can make a big difference  Your healthcare provider can also refer you to specialists such as a dietitian who can help you make small changes  · Limit sodium (salt) as directed  Too much sodium can affect your fluid balance  Check labels to find low-sodium or no-salt-added foods  Some low-sodium foods use potassium salts for flavor  Too much potassium can also cause health problems  Your healthcare provider will tell you how much sodium and potassium are safe for you to have in a day  He or she may recommend that you limit sodium to 2,300 mg a day  · Follow the meal plan recommended by your healthcare provider  A dietitian or your provider can give you more information on low-sodium plans or the DASH (Dietary Approaches to Stop Hypertension) eating plan  The DASH plan is low in sodium, unhealthy fats, and total fat  It is high in potassium, calcium, and fiber  · Exercise to maintain a healthy weight  Exercise at least 30 minutes per day, on most days of the week  This will help decrease your blood pressure  Ask your healthcare provider about the best exercise plan for you  · Decrease stress  This may help lower your blood pressure  Learn ways to relax, such as deep breathing or listening to music  · Limit alcohol as directed  Alcohol can increase your blood pressure  A drink of alcohol is 12 ounces of beer, 5 ounces of wine, or 1½ ounces of liquor  · Do not smoke    Nicotine and other chemicals in cigarettes and cigars can increase your blood pressure and also cause lung damage  Ask your healthcare provider for information if you currently smoke and need help to quit  E-cigarettes or smokeless tobacco still contain nicotine  Talk to your healthcare provider before you use these products  Follow up with your healthcare provider as directed: You will need to return to have your blood pressure checked and to have other lab tests done  Write down your questions so you remember to ask them during your visits  © Copyright [address] Information is for End User's use only and may not be sold, redistributed or otherwise used for commercial purposes  All illustrations and images included in CareNotes® are the copyrighted property of [employer] A M [employer] Inc  or [employer]   The above information is an  only  It is not intended as medical advice for individual conditions or treatments  Talk to your doctor, nurse or pharmacist before following any medical regimen to see if it is safe and effective for you  Obesity   AMBULATORY CARE:   Obesity  is when your body mass index (BMI) is greater than 30  Your healthcare provider will use your height and weight to measure your BMI  The risks of obesity include  many health problems, such as injuries or physical disability  You may need tests to check for the following:  · Diabetes    · High blood pressure or high cholesterol    · Heart disease    · Gallbladder or liver disease    · Cancer of the colon, breast, prostate, liver, or kidney    · Sleep apnea    · Arthritis or gout    Seek care immediately if:   · You have a severe headache, confusion, or difficulty speaking  · You have weakness on one side of your body  · You have chest pain, sweating, or shortness of breath  Contact your healthcare provider if:   · You have symptoms of gallbladder or liver disease, such as pain in your upper abdomen  · You have knee or hip pain and discomfort while walking      · You have symptoms of diabetes, such as intense hunger and thirst, and frequent urination  · You have symptoms of sleep apnea, such as snoring or daytime sleepiness  · You have questions or concerns about your condition or care  Treatment for obesity  focuses on helping you lose weight to improve your health  Even a small decrease in BMI can reduce the risk for many health problems  Your healthcare provider will help you set a weight-loss goal   · Lifestyle changes  are the first step in treating obesity  These include making healthy food choices and getting regular physical activity  Your healthcare provider may suggest a weight-loss program that involves coaching, education, and therapy  · Medicine  may help you lose weight when it is used with a healthy diet and physical activity  · Surgery  can help you lose weight if you are very obese and have other health problems  There are several types of weight-loss surgery  Ask your healthcare provider for more information  Be successful losing weight:   · Set small, realistic goals  An example of a small goal is to walk for 20 minutes 5 days a week  Anther goal is to lose 5% of your body weight  · Tell friends, family members, and coworkers about your goals  and ask for their support  Ask a friend to lose weight with you, or join a weight-loss support group  · Identify foods or triggers that may cause you to overeat , and find ways to avoid them  Remove tempting high-calorie foods from your home and workplace  Place a bowl of fresh fruit on your kitchen counter  If stress causes you to eat, then find other ways to cope with stress  · Keep a diary to track what you eat and drink  Also write down how many minutes of physical activity you do each day  Weigh yourself once a week and record it in your diary  Eating changes: You will need to eat 500 to 1,000 fewer calories each day than you currently eat to lose 1 to 2 pounds a week   The following changes will help you cut calories:  · Eat smaller portions  Use small plates, no larger than 9 inches in diameter  Fill your plate half full of fruits and vegetables  Measure your food using measuring cups until you know what a serving size looks like  · Eat 3 meals and 1 or 2 snacks each day  Plan your meals in advance  Alfredo Ace and eat at home most of the time  Eat slowly  Do not skip meals  Skipping meals can lead to overeating later in the day  This can make it harder for you to lose weight  Talk with a dietitian to help you make a meal plan and schedule that is right for you  · Eat fruits and vegetables at every meal   They are low in calories and high in fiber, which makes you feel full  Do not add butter, margarine, or cream sauce to vegetables  Use herbs to season steamed vegetables  · Eat less fat and fewer fried foods  Eat more baked or grilled chicken and fish  These protein sources are lower in calories and fat than red meat  Limit fast food  Dress your salads with olive oil and vinegar instead of bottled dressing  · Limit the amount of sugar you eat  Do not drink sugary beverages  Limit alcohol  Activity changes:  Physical activity is good for your body in many ways  It helps you burn calories and build strong muscles  It decreases stress and depression, and improves your mood  It can also help you sleep better  Talk to your healthcare provider before you begin an exercise program   · Exercise for at least 30 minutes 5 days a week  Start slowly  Set aside time each day for physical activity that you enjoy and that is convenient for you  It is best to do both weight training and an activity that increases your heart rate, such as walking, bicycling, or swimming  · Find ways to be more active  Do yard work and housecleaning  Walk up the stairs instead of using elevators  Spend your leisure time going to events that require walking, such as outdoor festivals or fairs   This extra physical activity can help you lose weight and keep it off  Follow up with your healthcare provider as directed: You may need to meet with a dietitian  Write down your questions so you remember to ask them during your visits  © Copyright [address] Information is for End User's use only and may not be sold, redistributed or otherwise used for commercial purposes  All illustrations and images included in CareNotes® are the copyrighted property of [employer]  or [facility] [employer]   The above information is an  only  It is not intended as medical advice for individual conditions or treatments  Talk to your doctor, nurse or pharmacist before following any medical regimen to see if it is safe and effective for you  Low Fat Diet   AMBULATORY CARE:   A low-fat diet  is an eating plan that is low in total fat, unhealthy fat, and cholesterol  You may need to follow a low-fat diet if you have trouble digesting or absorbing fat  You may also need to follow this diet if you have high cholesterol  You can also lower your cholesterol by increasing the amount of fiber in your diet  Soluble fiber is a type of fiber that helps to decrease cholesterol levels  Different types of fat in food:   · Limit unhealthy fats  A diet that is high in cholesterol, saturated fat, and trans fat may cause unhealthy cholesterol levels  Unhealthy cholesterol levels increase your risk of heart disease  ? Cholesterol:  Limit intake of cholesterol to less than 200 mg per day  Cholesterol is found in meat, eggs, and dairy  ? Saturated fat:  Limit saturated fat to less than 7% of your total daily calories  Ask your dietitian how many calories you need each day  Saturated fat is found in butter, cheese, ice cream, whole milk, and palm oil  Saturated fat is also found in meat, such as beef, pork, chicken skin, and processed meats  Processed meats include sausage, hot dogs, and bologna  ? Trans fat:  Avoid trans fat as much as possible   Trans fat is used in fried and baked foods  Foods that say trans fat free on the label may still have up to 0 5 grams of trans fat per serving  · Include healthy fats  Replace foods that are high in saturated and trans fat with foods high in healthy fats  This may help to decrease high cholesterol levels  ? Monounsaturated fats: These are found in avocados, nuts, and vegetable oils, such as olive, canola, and sunflower oil  ? Polyunsaturated fats: These can be found in vegetable oils, such as soybean or corn oil  Omega-3 fats can help to decrease the risk of heart disease  Omega-3 fats are found in fish, such as salmon, herring, trout, and tuna  Omega-3 fats can also be found in plant foods, such as walnuts, flaxseed, soybeans, and canola oil  Foods to limit or avoid:   · Grains:      ? Snacks that are made with partially hydrogenated oils, such as chips, regular crackers, and butter-flavored popcorn    ? High-fat baked goods, such as biscuits, croissants, doughnuts, pies, cookies, and pastries    · Dairy:      ? Whole milk, 2% milk, and yogurt and ice cream made with whole milk    ? Half and half creamer, heavy cream, and whipping cream    ? Cheese, cream cheese, and sour cream    · Meats and proteins:      ? High-fat cuts of meat (T-bone steak, regular hamburger, and ribs)    ? Fried meat, poultry (turkey and chicken), and fish    ? Poultry (chicken and turkey) with skin    ? Cold cuts (salami or bologna), hot dogs, dela cruz, and sausage    ? Whole eggs and egg yolks    · Vegetables and fruits with added fat:      ? Fried vegetables or vegetables in butter or high-fat sauces, such as cream or cheese sauces    ? Fried fruit or fruit served with butter or cream    · Fats:      ? Butter, stick margarine, and shortening    ? Coconut, palm oil, and palm kernel oil    Foods to include:   · Grains:      ? Whole-grain breads, cereals, pasta, and brown rice    ?  Low-fat crackers and pretzels    · Vegetables and fruits:      ? Fresh, frozen, or canned vegetables (no salt or low-sodium)    ? Fresh, frozen, dried, or canned fruit (canned in light syrup or fruit juice)    ? Avocado    · Low-fat dairy products:      ? Nonfat (skim) or 1% milk    ? Nonfat or low-fat cheese, yogurt, and cottage cheese    · Meats and proteins:      ? Chicken or turkey with no skin    ? Baked or broiled fish    ? Lean beef and pork (loin, round, extra lean hamburger)    ? Beans and peas, unsalted nuts, soy products    ? Egg whites and substitutes    ? Seeds and nuts    · Fats:      ? Unsaturated oil, such as canola, olive, peanut, soybean, or sunflower oil    ? Soft or liquid margarine and vegetable oil spread    ? Low-fat salad dressing    Other ways to decrease fat:   · Read food labels before you buy foods  Choose foods that have less than 30% of calories from fat  Choose low-fat or fat-free dairy products  Remember that fat free does not mean calorie free  These foods still contain calories, and too many calories can lead to weight gain  · Trim fat from meat and avoid fried food  Trim all visible fat from meat before you cook it  Remove the skin from poultry  Do not acuna meat, fish, or poultry  Bake, roast, boil, or broil these foods instead  Avoid fried foods  Eat a baked potato instead of [place] fries  Steam vegetables instead of sautéing them in butter  · Add less fat to foods  Use imitation dela cruz bits on salads and baked potatoes instead of regular dela cruz bits  Use fat-free or low-fat salad dressings instead of regular dressings  Use low-fat or nonfat butter-flavored topping instead of regular butter or margarine on popcorn and other foods  Ways to decrease fat in recipes:  Replace high-fat ingredients with low-fat or nonfat ones  This may cause baked goods to be drier than usual  You may need to use nonfat cooking spray on pans to prevent food from sticking   You also may need to change the amount of other ingredients, such as water, in the recipe  Try the following:  · Use low-fat or light margarine instead of regular margarine or shortening  · Use lean ground turkey breast or chicken, or lean ground beef (less than 5% fat) instead of hamburger  · Add 1 teaspoon of canola oil to 8 ounces of skim milk instead of using cream or half and half  · Use grated zucchini, carrots, or apples in breads instead of coconut  · Use blenderized, low-fat cottage cheese, plain tofu, or low-fat ricotta cheese instead of cream cheese  · Use 1 egg white and 1 teaspoon of canola oil, or use ¼ cup (2 ounces) of fat-free egg substitute instead of a whole egg  · Replace half of the oil that is called for in a recipe with applesauce when you bake  Use 3 tablespoons of cocoa powder and 1 tablespoon of canola oil instead of a square of baking chocolate  How to increase fiber:  Eat enough high-fiber foods to get 20 to 30 grams of fiber every day  Slowly increase your fiber intake to avoid stomach cramps, gas, and other problems  · Eat 3 ounces of whole-grain foods each day  An ounce is about 1 slice of bread  Eat whole-grain breads, such as whole-wheat bread  Whole wheat, whole-wheat flour, or other whole grains should be listed as the first ingredient on the food label  Replace white flour with whole-grain flour or use half of each in recipes  Whole-grain flour is heavier than white flour, so you may have to add more yeast or baking powder  · Eat a high-fiber cereal for breakfast   Oatmeal is a good source of soluble fiber  Look for cereals that have bran or fiber in the name  Choose whole-grain products, such as brown rice, barley, and whole-wheat pasta  · Eat more beans, peas, and lentils  For example, add beans to soups or salads  Eat at least 5 cups of fruits and vegetables each day  Eat fruits and vegetables with the peel because the peel is high in fiber      © Copyright [employer] 2020 Information is for End User's use only and may not be sold, redistributed or otherwise used for commercial purposes  All illustrations and images included in CareNotes® are the copyrighted property of [employer] M [employer]  or Regino Beck  The above information is an  only  It is not intended as medical advice for individual conditions or treatments  Talk to your doctor, nurse or pharmacist before following any medical regimen to see if it is safe and effective for you  Heart Healthy Diet   AMBULATORY CARE:   A heart healthy diet  is an eating plan low in unhealthy fats and sodium (salt)  The plan is high in healthy fats and fiber  A heart healthy diet helps improve your cholesterol levels and lowers your risk for heart disease and stroke  A dietitian will teach you how to read and understand food labels  Heart healthy diet guidelines to follow:   · Choose foods that contain healthy fats  ? Unsaturated fats  include monounsaturated and polyunsaturated fats  Unsaturated fat is found in foods such as soybean, canola, olive, corn, and safflower oils  It is also found in soft tub margarine that is made with liquid vegetable oil  ? Omega-3 fat  is found in certain fish, such as salmon, tuna, and trout, and in walnuts and flaxseed  Eat fish high in omega-3 fats at least 2 times a week  · Get 20 to 30 grams of fiber each day  Fruits, vegetables, whole-grain foods, and legumes (cooked beans) are good sources of fiber  · Limit or do not have unhealthy fats  ? Cholesterol  is found in animal foods, such as eggs and lobster, and in dairy products made from whole milk  Limit cholesterol to less than 200 mg each day  ? Saturated fat  is found in meats, such as dela cruz and hamburger  It is also found in chicken or turkey skin, whole milk, and butter  Limit saturated fat to less than 7% of your total daily calories  ? Trans fat  is found in packaged foods, such as potato chips and cookies   It is also in hard margarine, some fried foods, and shortening  Do not eat foods that contain trans fats  · Limit sodium as directed  You may be told to limit sodium to 2,000 to 2,300 mg each day  Choose low-sodium or no-salt-added foods  Add little or no salt to food you prepare  Use herbs and spices in place of salt  Include the following in your heart healthy plan:  Ask your dietitian or healthcare provider how many servings to have from each of the following food groups:  · Grains:      ? Whole-wheat breads, cereals, and pastas, and brown rice    ? Low-fat, low-sodium crackers and chips    · Vegetables:      ? Broccoli, green beans, green peas, and spinach    ? Collards, kale, and lima beans    ? Carrots, sweet potatoes, tomatoes, and peppers    ? Canned vegetables with no salt added    · Fruits:      ? Bananas, peaches, pears, and pineapple    ? Grapes, raisins, and dates    ? Oranges, tangerines, grapefruit, orange juice, and grapefruit juice    ? Apricots, mangoes, melons, and papaya    ? Raspberries and strawberries    ? Canned fruit with no added sugar    · Low-fat dairy:      ? Nonfat (skim) milk, 1% milk, and low-fat almond, cashew, or soy milks fortified with calcium    ? Low-fat cheese, regular or frozen yogurt, and cottage cheese    · Meats and proteins:      ? Lean cuts of beef and pork (loin, leg, round), skinless chicken and turkey    ? Legumes, soy products, egg whites, or nuts    Limit or do not include the following in your heart healthy plan:   · Unhealthy fats and oils:      ? Whole or 2% milk, cream cheese, sour cream, or cheese    ? High-fat cuts of beef (T-bone steaks, ribs), chicken or turkey with skin, and organ meats such as liver    ?  Butter, stick margarine, shortening, and cooking oils such as coconut or palm oil    · Foods and liquids high in sodium:      ? Packaged foods, such as frozen dinners, cookies, macaroni and cheese, and cereals with more than 300 mg of sodium per serving    ? Vegetables with added sodium, such as instant potatoes, vegetables with added sauces, or regular canned vegetables    ? Cured or smoked meats, such as hot dogs, dela cruz, and sausage    ? High-sodium ketchup, barbecue sauce, salad dressing, pickles, olives, soy sauce, or miso    · Foods and liquids high in sugar:      ? Candy, cake, cookies, pies, or doughnuts    ? Soft drinks (soda), sports drinks, or sweetened tea    ? Canned or dry mixes for cakes, soups, sauces, or gravies    Other healthy heart guidelines:   · Do not smoke  Nicotine and other chemicals in cigarettes and cigars can cause lung and heart damage  Ask your healthcare provider for information if you currently smoke and need help to quit  E-cigarettes or smokeless tobacco still contain nicotine  Talk to your healthcare provider before you use these products  · Limit or do not drink alcohol as directed  Alcohol can damage your heart and raise your blood pressure  Your healthcare provider may give you specific daily and weekly limits  The general recommended limit is 1 drink a day for women 21 or older and for men 72 or older  Do not have more than 3 drinks in a day or 7 in a week  The recommended limit is 2 drinks a day for men 24to 59years of age  Do not have more than 4 drinks in a day or 14 in a week  A drink of alcohol is 12 ounces of beer, 5 ounces of wine, or 1½ ounces of liquor  · Exercise regularly  Exercise can help you maintain a healthy weight and improve your blood pressure and cholesterol levels  Regular exercise can also decrease your risk for heart problems  Ask your healthcare provider about the best exercise plan for you  Do not start an exercise program without asking your healthcare provider  Follow up with your doctor or cardiologist as directed:  Write down your questions so you remember to ask them during your visits    © Copyright [employer] 2020 Information is for End User's use only and may not be sold, redistributed or otherwise used for commercial purposes  All illustrations and images included in CareNotes® are the copyrighted property of A D A M , Inc  or Regino Beck  The above information is an  only  It is not intended as medical advice for individual conditions or treatments  Talk to your doctor, nurse or pharmacist before following any medical regimen to see if it is safe and effective for you

## 2021-03-18 ENCOUNTER — TELEPHONE (OUTPATIENT)
Dept: INTERNAL MEDICINE CLINIC | Facility: CLINIC | Age: 61
End: 2021-03-18

## 2021-03-18 DIAGNOSIS — G47.9 SLEEP DISTURBANCE: Primary | ICD-10-CM

## 2021-03-18 RX ORDER — TRAZODONE HYDROCHLORIDE 50 MG/1
50 TABLET ORAL
Qty: 90 TABLET | Refills: 3 | Status: SHIPPED | OUTPATIENT
Start: 2021-03-18 | End: 2021-03-18

## 2021-03-18 RX ORDER — TRAZODONE HYDROCHLORIDE 50 MG/1
50 TABLET ORAL
Qty: 90 TABLET | Refills: 3 | Status: SHIPPED | OUTPATIENT
Start: 2021-03-18 | End: 2021-07-20

## 2021-03-29 DIAGNOSIS — G47.9 SLEEP DISTURBANCE: ICD-10-CM

## 2021-03-29 RX ORDER — ZOLPIDEM TARTRATE 10 MG/1
10 TABLET ORAL
Qty: 30 TABLET | Refills: 0 | Status: SHIPPED | OUTPATIENT
Start: 2021-03-29 | End: 2021-04-26 | Stop reason: SDUPTHER

## 2021-04-26 DIAGNOSIS — G47.9 SLEEP DISTURBANCE: ICD-10-CM

## 2021-04-26 RX ORDER — ZOLPIDEM TARTRATE 10 MG/1
10 TABLET ORAL
Qty: 30 TABLET | Refills: 0 | Status: SHIPPED | OUTPATIENT
Start: 2021-04-26 | End: 2021-05-27 | Stop reason: SDUPTHER

## 2021-04-27 ENCOUNTER — TELEPHONE (OUTPATIENT)
Dept: INTERNAL MEDICINE CLINIC | Facility: CLINIC | Age: 61
End: 2021-04-27

## 2021-04-27 DIAGNOSIS — I10 HYPERTENSION, UNSPECIFIED TYPE: ICD-10-CM

## 2021-04-27 RX ORDER — LISINOPRIL 10 MG/1
10 TABLET ORAL DAILY
Qty: 30 TABLET | Refills: 5 | Status: SHIPPED | OUTPATIENT
Start: 2021-04-27 | End: 2021-11-16 | Stop reason: SDUPTHER

## 2021-05-27 DIAGNOSIS — G47.9 SLEEP DISTURBANCE: ICD-10-CM

## 2021-05-27 RX ORDER — ZOLPIDEM TARTRATE 10 MG/1
10 TABLET ORAL
Qty: 30 TABLET | Refills: 0 | Status: SHIPPED | OUTPATIENT
Start: 2021-05-27 | End: 2021-06-28 | Stop reason: SDUPTHER

## 2021-06-25 ENCOUNTER — OFFICE VISIT (OUTPATIENT)
Dept: URGENT CARE | Facility: CLINIC | Age: 61
End: 2021-06-25
Payer: COMMERCIAL

## 2021-06-25 VITALS
DIASTOLIC BLOOD PRESSURE: 85 MMHG | HEART RATE: 60 BPM | RESPIRATION RATE: 20 BRPM | SYSTOLIC BLOOD PRESSURE: 154 MMHG | TEMPERATURE: 98.6 F | OXYGEN SATURATION: 98 %

## 2021-06-25 DIAGNOSIS — H11.31 CONJUNCTIVAL HEMORRHAGE OF RIGHT EYE: Primary | ICD-10-CM

## 2021-06-25 PROCEDURE — 99213 OFFICE O/P EST LOW 20 MIN: CPT | Performed by: PHYSICIAN ASSISTANT

## 2021-06-25 NOTE — PATIENT INSTRUCTIONS
Subconjunctival Hemorrhage   WHAT YOU NEED TO KNOW:   What is a subconjunctival hemorrhage? A subconjunctival hemorrhage is when blood collects under the conjunctiva in your eye  The conjunctiva is the clear lining that covers the white part of your eye  The blood comes from broken blood vessels under the conjunctiva  What causes a subconjunctival hemorrhage? The exact cause of your subconjunctival hemorrhage may not be known  The following are common causes:  · An accident or injury to the eye    · Hard coughs or sneezes    · Medical conditions, such as high blood pressure, diabetes, or a bleeding disorder    · Strain, such as when you lift a heavy object or have a bowel movement    · Blood thinning medicines    · Vomiting    What are the signs and symptoms of a subconjunctival hemorrhage? The most common sign is a red patch on the white part of your eye  The redness may be present for 2 to 3 weeks  You may have mild pain when you move your eye  How is a subconjunctival hemorrhage diagnosed? Your healthcare provider will examine your eye and check your vision  You may need tests to check for an underlying medical condition  How is a subconjunctival hemorrhage treated? A subconjunctival hemorrhage usually goes away on its own  You may need any of the following to help manage your symptoms:  · Cold or warm compress:  Use a cold pack during the first 24 hours  Ask how often to apply it and for how long each time  After the first 24 hours, apply a warm pack on your eye  Do this 3 times each day for about 10 to 15 minutes each time  · Eyedrops: You may need artificial tears to keep your eye moist  Use the drops as directed  When should I contact my healthcare provider? · The redness in your eye has not gone away after 3 weeks  · You have another subconjunctival hemorrhage  · You have subconjunctival hemorrhages in both eyes      · You have questions or concerns about your condition or care     When should I seek immediate care? · You have eye pain or sensitivity to light  · Your vision changes  · You have white or yellow discharge from your eye  CARE AGREEMENT:   You have the right to help plan your care  Learn about your health condition and how it may be treated  Discuss treatment options with your healthcare providers to decide what care you want to receive  You always have the right to refuse treatment  The above information is an  only  It is not intended as medical advice for individual conditions or treatments  Talk to your doctor, nurse or pharmacist before following any medical regimen to see if it is safe and effective for you  © Copyright [address] Information is for End User's use only and may not be sold, redistributed or otherwise used for commercial purposes   All illustrations and images included in CareNotes® are the copyrighted property of A D A M , Inc  or [address] [place] [address]

## 2021-06-28 DIAGNOSIS — G47.9 SLEEP DISTURBANCE: ICD-10-CM

## 2021-06-28 RX ORDER — ZOLPIDEM TARTRATE 10 MG/1
10 TABLET ORAL
Qty: 30 TABLET | Refills: 0 | Status: SHIPPED | OUTPATIENT
Start: 2021-06-28 | End: 2021-07-26 | Stop reason: SDUPTHER

## 2021-07-12 ENCOUNTER — RA CDI HCC (OUTPATIENT)
Dept: OTHER | Facility: HOSPITAL | Age: 61
End: 2021-07-12

## 2021-07-12 NOTE — PROGRESS NOTES
[facility]  coding opportunities          Chart reviewed, no opportunity found: CHART REVIEWED, NO OPPORTUNITY FOUND                     Patients insurance company:  [employer] [facility] (Medicare Advantage and Commercial)

## 2021-07-19 ENCOUNTER — APPOINTMENT (OUTPATIENT)
Dept: LAB | Facility: CLINIC | Age: 61
End: 2021-07-19
Payer: COMMERCIAL

## 2021-07-19 DIAGNOSIS — Z98.84 S/P GASTRIC BYPASS: ICD-10-CM

## 2021-07-19 LAB
FERRITIN SERPL-MCNC: 117 NG/ML (ref 8–388)
FOLATE SERPL-MCNC: >20 NG/ML (ref 3.1–17.5)
IRON SATN MFR SERPL: 40 %
IRON SERPL-MCNC: 104 UG/DL (ref 65–175)
TIBC SERPL-MCNC: 257 UG/DL (ref 250–450)
VIT B12 SERPL-MCNC: 531 PG/ML (ref 100–900)

## 2021-07-19 PROCEDURE — 84590 ASSAY OF VITAMIN A: CPT

## 2021-07-19 PROCEDURE — 82728 ASSAY OF FERRITIN: CPT

## 2021-07-19 PROCEDURE — 84207 ASSAY OF VITAMIN B-6: CPT

## 2021-07-19 PROCEDURE — 82746 ASSAY OF FOLIC ACID SERUM: CPT

## 2021-07-19 PROCEDURE — 36415 COLL VENOUS BLD VENIPUNCTURE: CPT

## 2021-07-19 PROCEDURE — 83550 IRON BINDING TEST: CPT

## 2021-07-19 PROCEDURE — 83540 ASSAY OF IRON: CPT

## 2021-07-19 PROCEDURE — 82607 VITAMIN B-12: CPT

## 2021-07-19 PROCEDURE — 84425 ASSAY OF VITAMIN B-1: CPT

## 2021-07-20 ENCOUNTER — APPOINTMENT (OUTPATIENT)
Dept: LAB | Facility: CLINIC | Age: 61
End: 2021-07-20
Payer: COMMERCIAL

## 2021-07-20 ENCOUNTER — OFFICE VISIT (OUTPATIENT)
Dept: INTERNAL MEDICINE CLINIC | Facility: CLINIC | Age: 61
End: 2021-07-20
Payer: COMMERCIAL

## 2021-07-20 VITALS
DIASTOLIC BLOOD PRESSURE: 84 MMHG | BODY MASS INDEX: 43.37 KG/M2 | SYSTOLIC BLOOD PRESSURE: 118 MMHG | HEIGHT: 71 IN | HEART RATE: 60 BPM | TEMPERATURE: 98.8 F | WEIGHT: 309.8 LBS | OXYGEN SATURATION: 98 % | RESPIRATION RATE: 14 BRPM

## 2021-07-20 DIAGNOSIS — C43.9 MALIGNANT MELANOMA OF SKIN (HCC): ICD-10-CM

## 2021-07-20 DIAGNOSIS — I10 ESSENTIAL HYPERTENSION: Primary | ICD-10-CM

## 2021-07-20 DIAGNOSIS — E78.2 MIXED HYPERLIPIDEMIA: ICD-10-CM

## 2021-07-20 DIAGNOSIS — Z98.84 S/P GASTRIC BYPASS: ICD-10-CM

## 2021-07-20 DIAGNOSIS — Z13.31 NEGATIVE DEPRESSION SCREENING: ICD-10-CM

## 2021-07-20 DIAGNOSIS — R97.20 ELEVATED PSA: ICD-10-CM

## 2021-07-20 DIAGNOSIS — F41.1 GENERALIZED ANXIETY DISORDER: ICD-10-CM

## 2021-07-20 DIAGNOSIS — E55.9 VITAMIN D DEFICIENCY: ICD-10-CM

## 2021-07-20 LAB — PSA SERPL-MCNC: 5.1 NG/ML (ref 0–4)

## 2021-07-20 PROCEDURE — 1036F TOBACCO NON-USER: CPT | Performed by: INTERNAL MEDICINE

## 2021-07-20 PROCEDURE — 84153 ASSAY OF PSA TOTAL: CPT

## 2021-07-20 PROCEDURE — 3074F SYST BP LT 130 MM HG: CPT | Performed by: INTERNAL MEDICINE

## 2021-07-20 PROCEDURE — 3008F BODY MASS INDEX DOCD: CPT | Performed by: INTERNAL MEDICINE

## 2021-07-20 PROCEDURE — 3725F SCREEN DEPRESSION PERFORMED: CPT | Performed by: INTERNAL MEDICINE

## 2021-07-20 PROCEDURE — 3079F DIAST BP 80-89 MM HG: CPT | Performed by: INTERNAL MEDICINE

## 2021-07-20 PROCEDURE — 99214 OFFICE O/P EST MOD 30 MIN: CPT | Performed by: INTERNAL MEDICINE

## 2021-07-20 RX ORDER — ASPIRIN 81 MG/1
81 TABLET ORAL DAILY
COMMUNITY

## 2021-07-20 NOTE — PROGRESS NOTES
Assessment/Plan:  Problem List Items Addressed This Visit        Cardiovascular and Mediastinum    Essential hypertension - Primary       Musculoskeletal and Integument    Malignant melanoma of skin ([facility] )       Other    Vitamin D deficiency    S/P gastric bypass    Mixed hyperlipidemia    Generalized anxiety disorder      Other Visit Diagnoses     Elevated PSA        Relevant Orders    PSA Total, Diagnostic    Negative depression screening               Diagnoses and all orders for this visit:    Essential hypertension    Malignant melanoma of skin ([facility] )    Vitamin D deficiency    S/P gastric bypass    Generalized anxiety disorder    Mixed hyperlipidemia    Elevated PSA  -     PSA Total, Diagnostic; Future    Negative depression screening    Other orders  -     aspirin (ECOTRIN LOW STRENGTH) 81 mg EC tablet; Take 81 mg by mouth daily        No problem-specific Assessment & Plan notes found for this encounter  A/P: Doing well and will check a PSA since it was elevated last time  Labs otherwise up to date and post bypass labs just ordered  Continue current treatment and RTC four months for routine  Subjective:      Patient ID: Prerna Vaughn is a 62 y o  male  WM RTC for f/u htn, hyperlipidemia, etc  Doing well and no new c/o's  S/P gastric bypass and doing well  More active w/o difficulty and no falls  ELIDIA improving  Chronic pain improving and manageable  Tolerating diet and meds  Melanoma in remission  BRITTANI is controlled  Due for labs  The following portions of the patient's history were reviewed and updated as appropriate:   He has a past medical history of Arthritis, 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[facility] ), Cancer ([facility] ), Generalized anxiety disorder, Hypertension, Obesity, and Primary osteoarthritis of both knees  ,  does not have any pertinent problems on file  ,   has a past surgical history that includes Knee surgery (Bilateral, 2006, 2008); Knee arthroscopy; Mohs surgery;  Tonsillectomy and adenoidectomy; Uvulectomy; and Joint replacement (Bilateral)  ,  family history includes Arthritis in his maternal grandmother; Cancer in his mother; Heart disease in his father, maternal grandmother, other, and paternal grandmother; Hypotension in his mother; Liver cancer in his other and paternal grandfather; Lung cancer in his other; Other in his brother; Prostate cancer in his family and other; Stroke in his maternal grandfather, maternal grandmother, and mother; Thyroid cancer in his mother  ,   reports that he has never smoked  He has never used smokeless tobacco  He reports current alcohol use  He reports that he does not use drugs  ,  is allergic to penicillin v   Current Outpatient Medications   Medication Sig Dispense Refill    aspirin (ECOTRIN LOW STRENGTH) 81 mg EC tablet Take 81 mg by mouth daily      Calcium Carbonate-Vitamin D 600-400 MG-UNIT per tablet Take 1 tablet by mouth      escitalopram (LEXAPRO) 20 mg tablet Take 1 tablet (20 mg total) by mouth daily 30 tablet 5    ketoconazole (NIZORAL) 2 % shampoo       lisinopril (ZESTRIL) 10 mg tablet Take 1 tablet (10 mg total) by mouth daily 30 tablet 5    pediatric multivitamin-iron (POLY-VI-SOL WITH IRON) solution Take 1 tablet by mouth 2 (two) times a day      zolpidem (AMBIEN) 10 mg tablet Take 1 tablet (10 mg total) by mouth daily at bedtime as needed for sleep 30 tablet 0     No current facility-administered medications for this visit  Review of Systems   Constitutional: Negative for activity change, chills, diaphoresis, fatigue and fever  HENT: Negative  Eyes: Negative for visual disturbance  Respiratory: Negative for cough, chest tightness, shortness of breath and wheezing  Cardiovascular: Negative for chest pain, palpitations and leg swelling  Gastrointestinal: Negative for abdominal pain, constipation, diarrhea, nausea and vomiting  Endocrine: Negative for cold intolerance and heat intolerance     Genitourinary: Negative for difficulty urinating, dysuria and frequency  Musculoskeletal: Negative for arthralgias, gait problem and myalgias  Neurological: Negative for dizziness, seizures, syncope, weakness, light-headedness and headaches  Psychiatric/Behavioral: Negative for confusion, dysphoric mood and sleep disturbance  The patient is not nervous/anxious  PHQ-9 Depression Screening    PHQ-9:   Frequency of the following problems over the past two weeks:      Little interest or pleasure in doing things: 0 - not at all  Feeling down, depressed, or hopeless: 0 - not at all  PHQ-2 Score: 0        Objective:  Vitals:    07/20/21 0734   BP: 118/84   BP Location: Left arm   Patient Position: Sitting   Cuff Size: Large   Pulse: 60   Resp: 14   Temp: 98 8 °F (37 1 °C)   SpO2: 98%   Weight: (!) 141 kg (309 lb 12 8 oz)   Height: 5' 11" (1 803 m)     Body mass index is 43 21 kg/m²  Physical Exam  Vitals and nursing note reviewed  Constitutional:       General: He is not in acute distress  Appearance: Normal appearance  He is not ill-appearing  HENT:      Head: Normocephalic and atraumatic  Mouth/Throat:      Mouth: Mucous membranes are moist    Eyes:      Extraocular Movements: Extraocular movements intact  Conjunctiva/sclera: Conjunctivae normal       Pupils: Pupils are equal, round, and reactive to light  Neck:      Vascular: No carotid bruit  Cardiovascular:      Rate and Rhythm: Normal rate and regular rhythm  Heart sounds: Normal heart sounds  Pulmonary:      Effort: Pulmonary effort is normal  No respiratory distress  Breath sounds: Normal breath sounds  No wheezing or rales  Abdominal:      General: Bowel sounds are normal  There is no distension  Palpations: Abdomen is soft  Tenderness: There is no abdominal tenderness  Musculoskeletal:      Cervical back: Neck supple  Right lower leg: No edema  Left lower leg: No edema  Neurological:      General: No focal deficit present  Mental Status: He is alert and oriented to person, place, and time  Mental status is at baseline  Psychiatric:         Mood and Affect: Mood normal          Behavior: Behavior normal          Thought Content:  Thought content normal          Judgment: Judgment normal

## 2021-07-20 NOTE — PATIENT INSTRUCTIONS
Chronic Hypertension   AMBULATORY CARE:   Hypertension  is high blood pressure  Your blood pressure is the force of your blood moving against the walls of your arteries  Hypertension causes your blood pressure to get so high that your heart has to work much harder than normal  This can damage your heart  Even if you have hypertension for years, lifestyle changes, medicines, or both can help bring your blood pressure to normal   Call your local emergency number (911 in the [facility]) or have someone call if:   · You have chest pain  · You have any of the following signs of a heart attack:      ? Squeezing, pressure, or pain in your chest    ? You may  also have any of the following:     § Discomfort or pain in your back, neck, jaw, stomach, or arm    § Shortness of breath    § Nausea or vomiting    § Lightheadedness or a sudden cold sweat    · You become confused or have difficulty speaking  · You suddenly feel lightheaded or have trouble breathing  Seek care immediately if:   · You have a severe headache or vision loss  · You have weakness in an arm or leg  Call your doctor if:   · You feel faint, dizzy, confused, or drowsy  · You have been taking your blood pressure medicine but your pressure is higher than your provider says it should be  · You have questions or concerns about your condition or care  Treatment for chronic hypertension  may include medicine to lower your blood pressure and cholesterol levels  A low cholesterol level helps prevent heart disease and makes it easier to control your blood pressure  Heart disease can make your blood pressure harder to control  You may also need to make lifestyle changes  What you need to know about the stages of hypertension:       · Normal blood pressure is 119/79 or lower   Your healthcare provider may only check your blood pressure each year if it stays at a normal level  · Elevated blood pressure is 120/79 to 129/79    This is sometimes called prehypertension  Your healthcare provider may suggest lifestyle changes to help lower your blood pressure to a normal level  He or she may then check it again in 3 to 6 months  · Stage 1 hypertension is 130/80  to 139/89   Your provider may recommend lifestyle changes, medication, and checks every 3 to 6 months until your blood pressure is controlled  · Stage 2 hypertension is 140/90 or higher   Your provider will recommend lifestyle changes and have you take 2 kinds of hypertension medicines  You will also need to have your blood pressure checked monthly until it is controlled  Manage chronic hypertension:   · Check your blood pressure at home  Avoid smoking, caffeine, and exercise at least 30 minutes before checking your blood pressure  Sit and rest for 5 minutes before you take your blood pressure  Extend your arm and support it on a flat surface  Your arm should be at the same level as your heart  Follow the directions that came with your blood pressure monitor  Check your blood pressure 2 times, 1 minute apart, before you take your medicine in the morning  Also check your blood pressure before your evening meal  Keep a record of your readings and bring it to your follow-up visits  Ask your healthcare provider what your blood pressure should be  · Manage any other health conditions you have  Health conditions such as diabetes can increase your risk for hypertension  Follow your healthcare provider's instructions and take all your medicines as directed  Talk to your healthcare provider about any new health conditions you have recently developed  · Ask about all medicines  Certain medicines can increase your blood pressure  Examples include oral birth control pills, decongestants, herbal supplements, and NSAIDs, such as ibuprofen  Your healthcare provider can tell you which medicines are safe for you to take  This includes prescription and over-the-counter medicines      Lifestyle changes you can make to lower your blood pressure: Your provider may want you to make more lifestyle changes if you are having trouble controlling your blood pressure  This may feel difficult over time, especially if you think you are making good changes but your pressure is still high  It might help to focus on one new change at a time  For example, try to add 1 more day of exercise, or exercise for an extra 10 minutes on 2 days  Small changes can make a big difference  Your healthcare provider can also refer you to specialists such as a dietitian who can help you make small changes  Your family members may be included in helping you learn to create lifestyle changes, such as the following:  · Limit sodium (salt) as directed  Too much sodium can affect your fluid balance  Check labels to find low-sodium or no-salt-added foods  Some low-sodium foods use potassium salts for flavor  Too much potassium can also cause health problems  Your healthcare provider will tell you how much sodium and potassium are safe for you to have in a day  He or she may recommend that you limit sodium to 2,300 mg a day  · Follow the meal plan recommended by your healthcare provider  A dietitian or your provider can give you more information on low-sodium plans or the DASH (Dietary Approaches to Stop Hypertension) eating plan  The DASH plan is low in sodium, processed sugar, unhealthy fats, and total fat  It is high in potassium, calcium, and fiber  These can be found in vegetables, fruit, and whole-grain foods  · Be physically active throughout the day  Physical activity, such as exercise, can help control your blood pressure and your weight  Be physically active for at least 30 minutes per day, on most days of the week  Include aerobic activity, such as walking or riding a bicycle  Also include strength training at least 2 times each week  Your healthcare providers can help you create a physical activity plan  · Decrease stress  This may help lower your blood pressure  Learn ways to relax, such as deep breathing or listening to music  · Limit alcohol as directed  Alcohol can increase your blood pressure  A drink of alcohol is 12 ounces of beer, 5 ounces of wine, or 1½ ounces of liquor  · Do not smoke  Nicotine and other chemicals in cigarettes and cigars can increase your blood pressure and also cause lung damage  Ask your healthcare provider for information if you currently smoke and need help to quit  E-cigarettes or smokeless tobacco still contain nicotine  Talk to your healthcare provider before you use these products  Follow up with your doctor as directed: You will need to return to have your blood pressure checked and to have other lab tests done  Write down your questions so you remember to ask them during your visits  © Copyright [employer] 2021 Information is for End User's use only and may not be sold, redistributed or otherwise used for commercial purposes  All illustrations and images included in CareNotes® are the copyrighted property of A D A M [employer] Inc  or [facility]   The above information is an  only  It is not intended as medical advice for individual conditions or treatments  Talk to your doctor, nurse or pharmacist before following any medical regimen to see if it is safe and effective for you

## 2021-07-21 DIAGNOSIS — R97.20 ELEVATED PSA: Primary | ICD-10-CM

## 2021-07-22 LAB — VIT A SERPL-MCNC: 47.9 UG/DL (ref 20.1–62)

## 2021-07-23 LAB
VIT B1 BLD-SCNC: 186.6 NMOL/L (ref 66.5–200)
VIT B6 SERPL-MCNC: 114.2 UG/L (ref 5.3–46.7)

## 2021-07-26 ENCOUNTER — TELEPHONE (OUTPATIENT)
Dept: UROLOGY | Facility: AMBULATORY SURGERY CENTER | Age: 61
End: 2021-07-26

## 2021-07-26 DIAGNOSIS — G47.9 SLEEP DISTURBANCE: ICD-10-CM

## 2021-07-26 RX ORDER — ZOLPIDEM TARTRATE 10 MG/1
10 TABLET ORAL
Qty: 30 TABLET | Refills: 0 | Status: SHIPPED | OUTPATIENT
Start: 2021-07-26 | End: 2021-08-30 | Stop reason: SDUPTHER

## 2021-07-26 NOTE — TELEPHONE ENCOUNTER
Reason for appointment/Complaint/Diagnosis : elevated psa 5 1    Insurance: [employer]    History of Cancer? yes  If yes, what kind?malignant melanoma     Previous urologist?  no               Records requested/where? Epic    Outside testing/where?no    Location Preference for office visit?  [place]

## 2021-07-26 NOTE — TELEPHONE ENCOUNTER
Call placed to patient and spoke with him  Offered him an appointment with Dr Jazmine Kim on 8- at 11am  Pt confirmed this appointment

## 2021-08-27 ENCOUNTER — OFFICE VISIT (OUTPATIENT)
Dept: UROLOGY | Facility: MEDICAL CENTER | Age: 61
End: 2021-08-27
Payer: COMMERCIAL

## 2021-08-27 VITALS
SYSTOLIC BLOOD PRESSURE: 130 MMHG | DIASTOLIC BLOOD PRESSURE: 80 MMHG | WEIGHT: 312 LBS | BODY MASS INDEX: 48.97 KG/M2 | HEIGHT: 67 IN | HEART RATE: 70 BPM

## 2021-08-27 DIAGNOSIS — N40.0 BPH WITHOUT OBSTRUCTION/LOWER URINARY TRACT SYMPTOMS: ICD-10-CM

## 2021-08-27 DIAGNOSIS — R97.20 ELEVATED PSA: Primary | ICD-10-CM

## 2021-08-27 PROCEDURE — 3075F SYST BP GE 130 - 139MM HG: CPT | Performed by: UROLOGY

## 2021-08-27 PROCEDURE — 1036F TOBACCO NON-USER: CPT | Performed by: UROLOGY

## 2021-08-27 PROCEDURE — 3079F DIAST BP 80-89 MM HG: CPT | Performed by: UROLOGY

## 2021-08-27 PROCEDURE — 99203 OFFICE O/P NEW LOW 30 MIN: CPT | Performed by: UROLOGY

## 2021-08-27 PROCEDURE — 3008F BODY MASS INDEX DOCD: CPT | Performed by: UROLOGY

## 2021-08-27 RX ORDER — MULTIVIT-MIN/IRON FUM/FOLIC AC 7.5 MG-4
1 TABLET ORAL DAILY
COMMUNITY

## 2021-08-27 NOTE — PROGRESS NOTES
Assessment/Plan:    Elevated PSA  PSA rising rapidly  MRI ordered  BPH without obstruction/lower urinary tract symptoms  Asymptomatic  Prostate enlargement noted on digital rectal exam        Diagnoses and all orders for this visit:    Elevated PSA  -     MRI prostate multiparametric wo w contrast; Future    BPH without obstruction/lower urinary tract symptoms    Other orders  -     Multiple Vitamins-Minerals (multivitamin with minerals) tablet; Take 1 tablet by mouth daily          Subjective:      Patient ID: Jasson Howell is a 62 y o  male  HPI  Elevated PSA:  [  0   Lab Value Date/Time    PSA 5 1 (H) 07/20/2021 0752    PSA 4 4 (H) 03/11/2021 0706    PSA 3 0 12/26/2017 0922     PSA at [facility] was 2 88 on Nov 30, 2015  Has had MRI's in the past but he was not fully in the tube  BPH:  He notes no significant urinary symptons  He denies other significant urinary symptoms  He denies gross hematuria, urinary tract infections or incontinence  He is taking neither medications nor supplements for his symptoms  AUA SYMPTOM SCORE      Most Recent Value   AUA SYMPTOM SCORE   How often have you had a sensation of not emptying your bladder completely after you finished urinating? 0   How often have you had to urinate again less than two hours after you finished urinating? 0   How often have you found you stopped and started again several times when you urinate?  0   How often have you found it difficult to postpone urination? 0   How often have you had a weak urinary stream?  0   How often have you had to push or strain to begin urination?   0   How many times did you most typically get up to urinate from the time you went to bed at night until the time you got up in the morning?  0   Quality of Life: If you were to spend the rest of your life with your urinary condition just the way it is now, how would you feel about that?  0   AUA SYMPTOM SCORE  0          The following portions of the patient's history were reviewed and updated as appropriate: allergies, current medications, past family history, past medical history, past social history, past surgical history and problem list     Review of Systems   Constitutional: Negative for activity change and fatigue  Respiratory: Negative for shortness of breath and wheezing  Cardiovascular: Negative for chest pain  Hypertension  Gastrointestinal: Negative for abdominal pain  Gastric sleeve in 2019  Lost 115 lb   Genitourinary: Negative for difficulty urinating, dysuria, frequency, hematuria and urgency  Musculoskeletal: Negative for back pain and gait problem  Skin: Negative  Allergic/Immunologic: Negative  Neurological: Negative  Psychiatric/Behavioral: Negative  Objective:      /80   Pulse 70   Ht 5' 7" (1 702 m)   Wt (!) 142 kg (312 lb)   BMI 48 87 kg/m²          Physical Exam  Constitutional:       Appearance: He is well-developed  HENT:      Head: Normocephalic and atraumatic  Pulmonary:      Effort: Pulmonary effort is normal    Genitourinary:     Rectum: Normal       Comments: The prostate is 40 grams, firm, smooth and non-tender  Musculoskeletal:         General: Normal range of motion  Cervical back: Normal range of motion and neck supple  Skin:     General: Skin is warm and dry  Neurological:      Mental Status: He is alert and oriented to person, place, and time  Psychiatric:         Behavior: Behavior normal          Thought Content:  Thought content normal          Judgment: Judgment normal

## 2021-08-27 NOTE — H&P (VIEW-ONLY)
Assessment/Plan:    Elevated PSA  PSA rising rapidly  MRI ordered  BPH without obstruction/lower urinary tract symptoms  Asymptomatic  Prostate enlargement noted on digital rectal exam        Diagnoses and all orders for this visit:    Elevated PSA  -     MRI prostate multiparametric wo w contrast; Future    BPH without obstruction/lower urinary tract symptoms    Other orders  -     Multiple Vitamins-Minerals (multivitamin with minerals) tablet; Take 1 tablet by mouth daily          Subjective:      Patient ID: Cindy Hatfield is a 62 y o  male  HPI  Elevated PSA:  [  0   Lab Value Date/Time    PSA 5 1 (H) 07/20/2021 0752    PSA 4 4 (H) 03/11/2021 0706    PSA 3 0 12/26/2017 0922     PSA at [facility] was 2 88 on Nov 30, 2015  Has had MRI's in the past but he was not fully in the tube  BPH:  He notes no significant urinary symptons  He denies other significant urinary symptoms  He denies gross hematuria, urinary tract infections or incontinence  He is taking neither medications nor supplements for his symptoms  AUA SYMPTOM SCORE      Most Recent Value   AUA SYMPTOM SCORE   How often have you had a sensation of not emptying your bladder completely after you finished urinating? 0   How often have you had to urinate again less than two hours after you finished urinating? 0   How often have you found you stopped and started again several times when you urinate?  0   How often have you found it difficult to postpone urination? 0   How often have you had a weak urinary stream?  0   How often have you had to push or strain to begin urination?   0   How many times did you most typically get up to urinate from the time you went to bed at night until the time you got up in the morning?  0   Quality of Life: If you were to spend the rest of your life with your urinary condition just the way it is now, how would you feel about that?  0   AUA SYMPTOM SCORE  0          The following portions of the patient's history were reviewed and updated as appropriate: allergies, current medications, past family history, past medical history, past social history, past surgical history and problem list     Review of Systems   Constitutional: Negative for activity change and fatigue  Respiratory: Negative for shortness of breath and wheezing  Cardiovascular: Negative for chest pain  Hypertension  Gastrointestinal: Negative for abdominal pain  Gastric sleeve in 2019  Lost 115 lb   Genitourinary: Negative for difficulty urinating, dysuria, frequency, hematuria and urgency  Musculoskeletal: Negative for back pain and gait problem  Skin: Negative  Allergic/Immunologic: Negative  Neurological: Negative  Psychiatric/Behavioral: Negative  Objective:      /80   Pulse 70   Ht 5' 7" (1 702 m)   Wt (!) 142 kg (312 lb)   BMI 48 87 kg/m²          Physical Exam  Constitutional:       Appearance: He is well-developed  HENT:      Head: Normocephalic and atraumatic  Pulmonary:      Effort: Pulmonary effort is normal    Genitourinary:     Rectum: Normal       Comments: The prostate is 40 grams, firm, smooth and non-tender  Musculoskeletal:         General: Normal range of motion  Cervical back: Normal range of motion and neck supple  Skin:     General: Skin is warm and dry  Neurological:      Mental Status: He is alert and oriented to person, place, and time  Psychiatric:         Behavior: Behavior normal          Thought Content:  Thought content normal          Judgment: Judgment normal

## 2021-08-29 PROBLEM — N40.0 BPH WITHOUT OBSTRUCTION/LOWER URINARY TRACT SYMPTOMS: Status: ACTIVE | Noted: 2021-08-29

## 2021-08-30 ENCOUNTER — ANESTHESIA EVENT (OUTPATIENT)
Dept: RADIOLOGY | Facility: HOSPITAL | Age: 61
End: 2021-08-30

## 2021-08-30 DIAGNOSIS — G47.9 SLEEP DISTURBANCE: ICD-10-CM

## 2021-08-30 RX ORDER — ZOLPIDEM TARTRATE 10 MG/1
10 TABLET ORAL
Qty: 30 TABLET | Refills: 0 | Status: SHIPPED | OUTPATIENT
Start: 2021-08-30 | End: 2021-09-29 | Stop reason: SDUPTHER

## 2021-08-30 NOTE — PRE-PROCEDURE INSTRUCTIONS
Pre-Surgery Instructions:   Medication Instructions    aspirin (ECOTRIN LOW STRENGTH) 81 mg EC tablet Patient was instructed by Physician and understands   Calcium Carbonate-Vitamin D 600-400 MG-UNIT per tablet Patient was instructed by Physician and understands   escitalopram (LEXAPRO) 20 mg tablet Patient was instructed by Physician and understands   ketoconazole (NIZORAL) 2 % shampoo Patient was instructed by Physician and understands   lisinopril (ZESTRIL) 10 mg tablet Patient was instructed by Physician and understands   Multiple Vitamins-Minerals (multivitamin with minerals) tablet Patient was instructed by Physician and understands   zolpidem (AMBIEN) 10 mg tablet Patient was instructed by Physician and understands  May take Lexapro am of procedure    H & P to be obtained as instructed or electronically from office  Please bring your physician order, insurance card, photo ID and a list of medications with you  You will be asked to change into a hospital gown for the test, wear clothing that is easy on/off  No jewelry or valuables please, wedding rings are ok  Please remove all body piercings before arrival to hospital   Bring glasses, hearing aids or dentures  Do not wear magnetic nail polish  Please notify office of any illness before MRI, coughing etc  Testing may need to be rescheduled  Please bring any prior CT or MRI studies of this body area that were not performed at a [facility]  Please no contacts, eye make up or artificial eye lashes  Please do not eat any solid food after midnight the night before MRI  This includes gum and hard candy  You may have clear liquids only 4 hours prior to arrival at hospital      Do not drink alcohol for 24 hours prior to visit  A  must bring you  to the hospital and drive you home upon discharge    You will be called with your arrival time the afternoon before MRI

## 2021-08-31 DIAGNOSIS — F41.1 GENERALIZED ANXIETY DISORDER: ICD-10-CM

## 2021-08-31 RX ORDER — ESCITALOPRAM OXALATE 20 MG/1
20 TABLET ORAL DAILY
Qty: 30 TABLET | Refills: 5 | Status: SHIPPED | OUTPATIENT
Start: 2021-08-31 | End: 2022-03-18

## 2021-09-01 ENCOUNTER — HOSPITAL ENCOUNTER (OUTPATIENT)
Dept: RADIOLOGY | Facility: HOSPITAL | Age: 61
Discharge: HOME | End: 2021-09-01
Attending: UROLOGY
Payer: COMMERCIAL

## 2021-09-01 ENCOUNTER — ANESTHESIA (OUTPATIENT)
Dept: RADIOLOGY | Facility: HOSPITAL | Age: 61
End: 2021-09-01

## 2021-09-01 VITALS
RESPIRATION RATE: 18 BRPM | OXYGEN SATURATION: 97 % | TEMPERATURE: 98 F | HEIGHT: 67 IN | BODY MASS INDEX: 48.97 KG/M2 | HEART RATE: 62 BPM | WEIGHT: 312 LBS | DIASTOLIC BLOOD PRESSURE: 80 MMHG | SYSTOLIC BLOOD PRESSURE: 126 MMHG

## 2021-09-01 DIAGNOSIS — R97.20 ELEVATED PSA: ICD-10-CM

## 2021-09-01 PROCEDURE — G1004 CDSM NDSC: HCPCS

## 2021-09-01 PROCEDURE — 72197 MRI PELVIS W/O & W/DYE: CPT

## 2021-09-01 PROCEDURE — 76377 3D RENDER W/INTRP POSTPROCES: CPT

## 2021-09-01 PROCEDURE — A9585 GADOBUTROL INJECTION: HCPCS | Performed by: UROLOGY

## 2021-09-01 RX ORDER — PROPOFOL 10 MG/ML
INJECTION, EMULSION INTRAVENOUS AS NEEDED
Status: DISCONTINUED | OUTPATIENT
Start: 2021-09-01 | End: 2021-09-01

## 2021-09-01 RX ORDER — LIDOCAINE HYDROCHLORIDE 10 MG/ML
INJECTION, SOLUTION EPIDURAL; INFILTRATION; INTRACAUDAL; PERINEURAL AS NEEDED
Status: DISCONTINUED | OUTPATIENT
Start: 2021-09-01 | End: 2021-09-01

## 2021-09-01 RX ORDER — LIDOCAINE HYDROCHLORIDE 10 MG/ML
0.5 INJECTION, SOLUTION EPIDURAL; INFILTRATION; INTRACAUDAL; PERINEURAL ONCE AS NEEDED
Status: COMPLETED | OUTPATIENT
Start: 2021-09-01 | End: 2021-09-01

## 2021-09-01 RX ORDER — SODIUM CHLORIDE, SODIUM LACTATE, POTASSIUM CHLORIDE, CALCIUM CHLORIDE 600; 310; 30; 20 MG/100ML; MG/100ML; MG/100ML; MG/100ML
125 INJECTION, SOLUTION INTRAVENOUS CONTINUOUS
Status: DISCONTINUED | OUTPATIENT
Start: 2021-09-01 | End: 2021-09-02 | Stop reason: HOSPADM

## 2021-09-01 RX ADMIN — LIDOCAINE HYDROCHLORIDE 50 MG: 10 INJECTION, SOLUTION EPIDURAL; INFILTRATION; INTRACAUDAL; PERINEURAL at 14:07

## 2021-09-01 RX ADMIN — PROPOFOL 200 MG: 10 INJECTION, EMULSION INTRAVENOUS at 14:08

## 2021-09-01 RX ADMIN — SODIUM CHLORIDE, SODIUM LACTATE, POTASSIUM CHLORIDE, AND CALCIUM CHLORIDE 125 ML/HR: .6; .31; .03; .02 INJECTION, SOLUTION INTRAVENOUS at 13:30

## 2021-09-01 RX ADMIN — GADOBUTROL 14 ML: 604.72 INJECTION INTRAVENOUS at 15:09

## 2021-09-01 RX ADMIN — LIDOCAINE HYDROCHLORIDE 0.5 ML: 10 INJECTION, SOLUTION EPIDURAL; INFILTRATION; INTRACAUDAL; PERINEURAL at 13:31

## 2021-09-01 NOTE — INTERVAL H&P NOTE
H&P reviewed  After examining the patient I find no changes in the patients condition since the H&P had been written      Vitals:    09/01/21 1248   BP: 126/89   Pulse: (!) 53   Resp: 18   Temp: (!) 96 7 °F (35 9 °C)   SpO2: 96%   Abdomen-Soft, non tender

## 2021-09-01 NOTE — ANESTHESIA POSTPROCEDURE EVALUATION
Post-Op Assessment Note    CV Status:  Stable  Pain Score: 0    Pain management: adequate     Mental Status:  Awake   Hydration Status:  Euvolemic   PONV Controlled:  None   Airway Patency:  Patent      Post Op Vitals Reviewed: Yes      Staff: Anesthesiologist, CRNA   Comments: report given to RN; VSS; RA        No complications documented      BP   127/58   Temp     Pulse 66   Resp   20   SpO2   94

## 2021-09-01 NOTE — INTERVAL H&P NOTE
H&P reviewed  After examining the patient I find no changes in the patients condition since the H&P had been written      Vitals:    09/01/21 1248   BP: 126/89   Pulse: (!) 53   Resp: 18   Temp: (!) 96 7 °F (35 9 °C)   SpO2: 96%   Chest- RRR, CLear

## 2021-09-01 NOTE — LETTER
[address] [place] [address]  [address] [facility] [address]      September 15, 2021    MRN: [id number]     Phone: [phone]     Dear Mr Bayron Arbaham recently had a(n) MRI performed on 9/1/2021 at  [address] [place] [address] that was requested by Donald Antoine MD  The study was reviewed by a radiologist, which is a physician who specializes in medical imaging  The radiologist issued a report describing his or her findings  In that report there was a finding that the radiologist felt warranted further discussion with your health care provider and that discussion would be beneficial to you  The results were sent to Donald Antoine MD on 09/10/2021 10:50 AM  We recommend that you contact Donald Antoine MD at [phone] or set up an appointment to discuss the results of the imaging test  If you have already heard from Donald Antoine MD regarding the results of your study, you can disregard this letter  This letter is not meant to alarm you, but intended to encourage you to follow-up on your results with the provider that sent you for the imaging study  In addition, we have enclosed answers to frequently asked questions by other patients who have also received a letter to review results with their health care provider (see page two)  Thank you for choosing [address] [place] [address] for your medical imaging needs  FREQUENTLY ASKED QUESTIONS    1  Why am I receiving this letter? [facility] requires us to notify patients who have findings on imaging exams that may require more testing or follow-up with a health professional within the next 3 months          2  How serious is the finding on the imaging test?  This letter is sent to all patients who may need follow-up or more testing within the next 3 months  Receiving this letter does not necessarily mean you have a life-threatening imaging finding or disease  Recommendations in the radiologists imaging report are general in nature and it is up to your healthcare provider to say whether those recommendations make sense for your situation  You are strongly encouraged to talk to your health care provider about the results and ask whether additional steps need to be taken  3  Where can I get a copy of the final report for my recent radiology exam?  To get a full copy of the report you can access your records online at [email or web address] or please contact [address] [place] [address] Records Department at [phone] Monday through Friday between 8 am and 6 pm          4  What do I need to do now? Please contact your health care provider who requested the imaging study to discuss what further actions (if any) are needed  You may have already heard from (your ordering provider) in regard to this test in which case you can disregard this letter  NOTICE IN ACCORDANCE WITH THE [facility] PATIENT TEST RESULT INFORMATION ACT OF 2018    You are receiving this notice as a result of a determination by your diagnostic imaging service that further discussions of your test results are warranted and would be beneficial to you  The complete results of your test or tests have been or will be sent to the health care practitioner that ordered the test or tests  It is recommended that you contact your health care practitioner to discuss your results as soon as possible

## 2021-09-01 NOTE — NURSING NOTE
MRI completed, patient tolerated procedure  Post vital signs taken and recorded  Report given to  [facility] nurse isabelle  Patient placed in transport to be taken to [facility]  Patient offers no complaints or verbalizing any issues upon leaving MRI

## 2021-09-01 NOTE — ANESTHESIA PREPROCEDURE EVALUATION
Procedure:  MRI PROSTATE MULTIPARAMETRIC WO W CONTRAST    Relevant Problems   CARDIO   (+) Essential hypertension   (+) First degree AV block   (+) Mixed hyperlipidemia   (+) Mobitz (type) I (Wenckebach's) atrioventricular block      /RENAL   (+) BPH without obstruction/lower urinary tract symptoms      MUSCULOSKELETAL   (+) Primary osteoarthritis of both knees      NEURO/PSYCH   (+) Generalized anxiety disorder      PULMONARY   (+) Obstructive sleep apnea        Physical Exam    Airway    Mallampati score: II  TM Distance: >3 FB  Neck ROM: full     Dental       Cardiovascular  Cardiovascular exam normal    Pulmonary  Pulmonary exam normal     Other Findings        Anesthesia Plan  ASA Score- 2     Anesthesia Type- general with ASA Monitors  Additional Monitors:   Airway Plan: LMA  Plan Factors-Exercise tolerance (METS): >4 METS  Chart reviewed  Patient summary reviewed  Induction- intravenous  Postoperative Plan-     Informed Consent- Anesthetic plan and risks discussed with patient  I personally reviewed this patient with the CRNA  Discussed and agreed on the Anesthesia Plan with the CRNA  Marco Hernandez

## 2021-09-10 ENCOUNTER — TELEPHONE (OUTPATIENT)
Dept: UROLOGY | Facility: AMBULATORY SURGERY CENTER | Age: 61
End: 2021-09-10

## 2021-09-10 NOTE — TELEPHONE ENCOUNTER
PI-RADS score of 4 means that we need to proceed with a prostate biopsy to get more definitive information about the presence or lack of presence of cancer being in the prostate    I do not see that he has follow-up scheduled as of yet

## 2021-09-10 NOTE — TELEPHONE ENCOUNTER
Call placed to patient and scheduled him with Dr Solange De Guzman on 9- at 1pm to discuss results of recent MRI  Pt confirmed this appointment date and time

## 2021-09-14 ENCOUNTER — OFFICE VISIT (OUTPATIENT)
Dept: UROLOGY | Facility: MEDICAL CENTER | Age: 61
End: 2021-09-14
Payer: COMMERCIAL

## 2021-09-14 VITALS
WEIGHT: 313 LBS | HEIGHT: 67 IN | DIASTOLIC BLOOD PRESSURE: 68 MMHG | SYSTOLIC BLOOD PRESSURE: 112 MMHG | HEART RATE: 60 BPM | BODY MASS INDEX: 49.12 KG/M2

## 2021-09-14 DIAGNOSIS — R97.20 ELEVATED PSA: Primary | ICD-10-CM

## 2021-09-14 PROCEDURE — 3008F BODY MASS INDEX DOCD: CPT | Performed by: UROLOGY

## 2021-09-14 PROCEDURE — 99214 OFFICE O/P EST MOD 30 MIN: CPT | Performed by: UROLOGY

## 2021-09-14 PROCEDURE — 1036F TOBACCO NON-USER: CPT | Performed by: UROLOGY

## 2021-09-14 PROCEDURE — 3074F SYST BP LT 130 MM HG: CPT | Performed by: UROLOGY

## 2021-09-14 PROCEDURE — 3078F DIAST BP <80 MM HG: CPT | Performed by: UROLOGY

## 2021-09-14 NOTE — PROGRESS NOTES
Assessment/Plan:    Elevated PSA  PIRADS 4 MRI  Fusion biopsy scheduled  Diagnoses and all orders for this visit:    Elevated PSA  -     Case request operating room: TRANSRECTAL MRI FUSION BIOPSY PROSTATE; Standing  -     Case request operating room: TRANSRECTAL MRI FUSION BIOPSY PROSTATE    Other orders  -     Diet NPO; Sips with meds; Standing  -     Place sequential compression device; Standing  -     CBC and Platelet; Standing  -     Comprehensive metabolic panel; Standing  -     Electrocardiogram, 12-lead; Standing          Subjective:      Patient ID: Renetta Lee is a 62 y o  male  HPI  Elevated PSA:    MRI was PIRADS 4, with a prostate volume of 39 cc  The patient returns today to discuss a fusion biopsy  We discussed the fusion biopsy in detail  At this point, if he has prostate cancer I expect it would be confined to the prostate and his long-term survival according to the [facility] algorithm should be excellent  The patient agreed to the fusion biopsy and I have scheduled it  0   Lab Value Date/Time    PSA 5 1 (H) 07/20/2021 0752    PSA 4 4 (H) 03/11/2021 0706    PSA 3 0 12/26/2017 2803   ]      BPH:  He notes no significant urinary symptons  He denies other significant urinary symptoms  He denies gross hematuria, urinary tract infections or incontinence  He is taking neither medications nor supplements for his symptoms  AUA SYMPTOM SCORE      Most Recent Value   AUA SYMPTOM SCORE   How often have you had a sensation of not emptying your bladder completely after you finished urinating? 0   How often have you had to urinate again less than two hours after you finished urinating? 0   How often have you found you stopped and started again several times when you urinate?  0   How often have you found it difficult to postpone urination?   0   How often have you had a weak urinary stream?  0   How often have you had to push or strain to begin urination?  0 How many times did you most typically get up to urinate from the time you went to bed at night until the time you got up in the morning?  0   Quality of Life: If you were to spend the rest of your life with your urinary condition just the way it is now, how would you feel about that?  0   AUA SYMPTOM SCORE  0          The following portions of the patient's history were reviewed and updated as appropriate: allergies, current medications, past family history, past medical history, past social history, past surgical history and problem list     Review of Systems    Constitutional: Negative for activity change and fatigue  Respiratory: Negative for shortness of breath and wheezing  Cardiovascular: Negative for chest pain  Hypertension  Gastrointestinal: Negative for abdominal pain  Gastric sleeve in 2019  Lost 115 lb   Genitourinary: Negative for difficulty urinating, dysuria, frequency, hematuria and urgency  Musculoskeletal: Negative for back pain and gait problem  Skin: Negative  Allergic/Immunologic: Negative  Neurological: Negative  Psychiatric/Behavioral: Negative  Objective:      /68   Pulse 60   Ht 5' 7" (1 702 m)   Wt (!) 142 kg (313 lb)   BMI 49 02 kg/m²          Physical Exam  Constitutional:       Appearance: He is well-developed  Comments: Morbid obesity  HENT:      Head: Normocephalic and atraumatic  Pulmonary:      Effort: Pulmonary effort is normal    Musculoskeletal:         General: Normal range of motion  Cervical back: Normal range of motion  Skin:     General: Skin is warm and dry  Neurological:      Mental Status: He is alert and oriented to person, place, and time  Psychiatric:         Behavior: Behavior normal          Thought Content:  Thought content normal          Judgment: Judgment normal

## 2021-09-17 ENCOUNTER — TELEPHONE (OUTPATIENT)
Dept: UROLOGY | Facility: CLINIC | Age: 61
End: 2021-09-17

## 2021-09-17 NOTE — TELEPHONE ENCOUNTER
Received message on VM regarding scheduling a transrectal MRI fusion biopsy  Patient seen by Dr Brittani Loera on  9/14/21  Called patient back and informed him Aida Boeck will be reaching out to him to schedule ASAP  Patient was appreciative of phone call but is just anxious to schedule

## 2021-09-22 ENCOUNTER — TELEPHONE (OUTPATIENT)
Dept: INTERNAL MEDICINE CLINIC | Facility: CLINIC | Age: 61
End: 2021-09-22

## 2021-09-22 NOTE — TELEPHONE ENCOUNTER
Pt restarted taking his trazadone, and it's been working  He's asking if a new rx can be sent in for it  Since it was d/c'd prior

## 2021-09-22 NOTE — TELEPHONE ENCOUNTER
Patient called in stating 11/9/21  Patient has several questions regarding the procedure   Patient can be reached at [phone]

## 2021-09-22 NOTE — TELEPHONE ENCOUNTER
I reahed out to the patient by return [employer] message suggesting 11/9/2021 with Dr Miguel Page at the  GI Lab

## 2021-09-22 NOTE — TELEPHONE ENCOUNTER
I spoke to the patient and confirmed 11/9/21 with Dr Emily Ibrahim under IV Sed at [facility]     -instructions given verbally and Emailed  -patient aware to be NPO, needs a  and use a enema 1 hour prior to arrival   -CBC, CMP, EKG 2 weeks prior  -patient will stop his Aspirin 7 days prior  -HBS - sent to Aam[facility] Alarcon

## 2021-09-23 DIAGNOSIS — G47.9 SLEEP DISTURBANCE: Primary | ICD-10-CM

## 2021-09-23 RX ORDER — TRAZODONE HYDROCHLORIDE 50 MG/1
50 TABLET ORAL
Qty: 90 TABLET | Refills: 3 | Status: SHIPPED | OUTPATIENT
Start: 2021-09-23 | End: 2022-03-29 | Stop reason: SDUPTHER

## 2021-09-29 DIAGNOSIS — G47.9 SLEEP DISTURBANCE: ICD-10-CM

## 2021-09-29 RX ORDER — ZOLPIDEM TARTRATE 10 MG/1
10 TABLET ORAL
Qty: 30 TABLET | Refills: 0 | Status: SHIPPED | OUTPATIENT
Start: 2021-09-29 | End: 2021-11-03 | Stop reason: SDUPTHER

## 2021-11-02 ENCOUNTER — TELEPHONE (OUTPATIENT)
Dept: UROLOGY | Facility: AMBULATORY SURGERY CENTER | Age: 61
End: 2021-11-02

## 2021-11-03 DIAGNOSIS — G47.9 SLEEP DISTURBANCE: ICD-10-CM

## 2021-11-03 RX ORDER — ZOLPIDEM TARTRATE 10 MG/1
10 TABLET ORAL
Qty: 30 TABLET | Refills: 0 | Status: SHIPPED | OUTPATIENT
Start: 2021-11-03 | End: 2021-12-06 | Stop reason: SDUPTHER

## 2021-11-16 DIAGNOSIS — I10 HYPERTENSION, UNSPECIFIED TYPE: ICD-10-CM

## 2021-11-16 RX ORDER — LISINOPRIL 10 MG/1
10 TABLET ORAL DAILY
Qty: 30 TABLET | Refills: 5 | Status: SHIPPED | OUTPATIENT
Start: 2021-11-16 | End: 2022-06-13 | Stop reason: SDUPTHER

## 2021-11-24 ENCOUNTER — OFFICE VISIT (OUTPATIENT)
Dept: INTERNAL MEDICINE CLINIC | Facility: CLINIC | Age: 61
End: 2021-11-24
Payer: COMMERCIAL

## 2021-11-24 VITALS
OXYGEN SATURATION: 96 % | BODY MASS INDEX: 47.89 KG/M2 | DIASTOLIC BLOOD PRESSURE: 78 MMHG | WEIGHT: 305.13 LBS | TEMPERATURE: 97.7 F | HEIGHT: 67 IN | SYSTOLIC BLOOD PRESSURE: 126 MMHG | HEART RATE: 65 BPM

## 2021-11-24 DIAGNOSIS — G47.33 OBSTRUCTIVE SLEEP APNEA: ICD-10-CM

## 2021-11-24 DIAGNOSIS — Z12.5 SCREENING FOR PROSTATE CANCER: ICD-10-CM

## 2021-11-24 DIAGNOSIS — I10 ESSENTIAL HYPERTENSION: Primary | ICD-10-CM

## 2021-11-24 DIAGNOSIS — E55.9 VITAMIN D DEFICIENCY: ICD-10-CM

## 2021-11-24 DIAGNOSIS — F41.1 GENERALIZED ANXIETY DISORDER: ICD-10-CM

## 2021-11-24 DIAGNOSIS — Z23 ENCOUNTER FOR VACCINATION: ICD-10-CM

## 2021-11-24 DIAGNOSIS — C43.9 MALIGNANT MELANOMA OF SKIN (HCC): ICD-10-CM

## 2021-11-24 DIAGNOSIS — N40.0 BPH WITHOUT OBSTRUCTION/LOWER URINARY TRACT SYMPTOMS: ICD-10-CM

## 2021-11-24 PROCEDURE — 90682 RIV4 VACC RECOMBINANT DNA IM: CPT | Performed by: INTERNAL MEDICINE

## 2021-11-24 PROCEDURE — 99214 OFFICE O/P EST MOD 30 MIN: CPT | Performed by: INTERNAL MEDICINE

## 2021-11-24 PROCEDURE — 90471 IMMUNIZATION ADMIN: CPT | Performed by: INTERNAL MEDICINE

## 2021-12-06 DIAGNOSIS — G47.9 SLEEP DISTURBANCE: ICD-10-CM

## 2021-12-06 RX ORDER — ZOLPIDEM TARTRATE 10 MG/1
10 TABLET ORAL
Qty: 30 TABLET | Refills: 0 | Status: SHIPPED | OUTPATIENT
Start: 2021-12-06 | End: 2022-01-05 | Stop reason: SDUPTHER

## 2022-01-04 DIAGNOSIS — G47.9 SLEEP DISTURBANCE: ICD-10-CM

## 2022-01-05 RX ORDER — ZOLPIDEM TARTRATE 10 MG/1
10 TABLET ORAL
Qty: 30 TABLET | Refills: 0 | Status: SHIPPED | OUTPATIENT
Start: 2022-01-05 | End: 2022-02-07 | Stop reason: SDUPTHER

## 2022-02-07 DIAGNOSIS — G47.9 SLEEP DISTURBANCE: ICD-10-CM

## 2022-02-07 RX ORDER — ZOLPIDEM TARTRATE 10 MG/1
10 TABLET ORAL
Qty: 30 TABLET | Refills: 0 | Status: SHIPPED | OUTPATIENT
Start: 2022-02-07 | End: 2022-03-10 | Stop reason: SDUPTHER

## 2022-02-17 ENCOUNTER — TELEPHONE (OUTPATIENT)
Dept: INTERNAL MEDICINE CLINIC | Facility: CLINIC | Age: 62
End: 2022-02-17

## 2022-02-17 ENCOUNTER — APPOINTMENT (OUTPATIENT)
Dept: LAB | Facility: CLINIC | Age: 62
End: 2022-02-17
Payer: COMMERCIAL

## 2022-02-17 DIAGNOSIS — R97.20 ELEVATED PROSTATE SPECIFIC ANTIGEN (PSA): ICD-10-CM

## 2022-02-17 DIAGNOSIS — E55.9 VITAMIN D DEFICIENCY: ICD-10-CM

## 2022-02-17 DIAGNOSIS — Z01.818 PREOP TESTING: Primary | ICD-10-CM

## 2022-02-17 DIAGNOSIS — Z12.5 SCREENING FOR PROSTATE CANCER: ICD-10-CM

## 2022-02-17 DIAGNOSIS — I10 ESSENTIAL HYPERTENSION: ICD-10-CM

## 2022-02-17 LAB — PSA SERPL-MCNC: 4.5 NG/ML (ref 0–4)

## 2022-02-17 PROCEDURE — G0103 PSA SCREENING: HCPCS

## 2022-02-17 PROCEDURE — 36415 COLL VENOUS BLD VENIPUNCTURE: CPT

## 2022-02-17 PROCEDURE — 87636 SARSCOV2 & INF A&B AMP PRB: CPT | Performed by: INTERNAL MEDICINE

## 2022-02-17 NOTE — TELEPHONE ENCOUNTER
Pt needs a covid test prior to having biopsy on 2/21/22 at [facility]  I told him we could do a rapid test or have a pcr done  He wants to do the pcr and will come today at 2:25 pm  He is going to check with his doctor as to what they will accept

## 2022-02-18 LAB
FLUAV RNA RESP QL NAA+PROBE: NEGATIVE
FLUBV RNA RESP QL NAA+PROBE: NEGATIVE
SARS-COV-2 RNA RESP QL NAA+PROBE: NEGATIVE

## 2022-03-10 DIAGNOSIS — G47.9 SLEEP DISTURBANCE: ICD-10-CM

## 2022-03-10 RX ORDER — ZOLPIDEM TARTRATE 10 MG/1
10 TABLET ORAL
Qty: 30 TABLET | Refills: 0 | Status: SHIPPED | OUTPATIENT
Start: 2022-03-10 | End: 2022-04-13 | Stop reason: SDUPTHER

## 2022-03-18 DIAGNOSIS — F41.1 GENERALIZED ANXIETY DISORDER: ICD-10-CM

## 2022-03-18 RX ORDER — ESCITALOPRAM OXALATE 20 MG/1
TABLET ORAL
Qty: 30 TABLET | Refills: 5 | Status: SHIPPED | OUTPATIENT
Start: 2022-03-18 | End: 2022-03-23 | Stop reason: SDUPTHER

## 2022-03-22 ENCOUNTER — TELEPHONE (OUTPATIENT)
Dept: INTERNAL MEDICINE CLINIC | Facility: CLINIC | Age: 62
End: 2022-03-22

## 2022-03-22 DIAGNOSIS — F41.1 GENERALIZED ANXIETY DISORDER: ICD-10-CM

## 2022-03-23 RX ORDER — ESCITALOPRAM OXALATE 20 MG/1
20 TABLET ORAL DAILY
Qty: 30 TABLET | Refills: 5 | Status: SHIPPED | OUTPATIENT
Start: 2022-03-23 | End: 2022-06-21 | Stop reason: SDUPTHER

## 2022-03-29 DIAGNOSIS — G47.9 SLEEP DISTURBANCE: ICD-10-CM

## 2022-03-29 RX ORDER — TRAZODONE HYDROCHLORIDE 50 MG/1
50 TABLET ORAL
Qty: 90 TABLET | Refills: 3 | Status: SHIPPED | OUTPATIENT
Start: 2022-03-29 | End: 2022-06-21 | Stop reason: SDUPTHER

## 2022-04-13 DIAGNOSIS — G47.9 SLEEP DISTURBANCE: ICD-10-CM

## 2022-04-13 RX ORDER — ZOLPIDEM TARTRATE 10 MG/1
10 TABLET ORAL
Qty: 30 TABLET | Refills: 0 | Status: SHIPPED | OUTPATIENT
Start: 2022-04-13 | End: 2022-05-16 | Stop reason: SDUPTHER

## 2022-04-25 ENCOUNTER — TELEPHONE (OUTPATIENT)
Dept: INTERNAL MEDICINE CLINIC | Facility: CLINIC | Age: 62
End: 2022-04-25

## 2022-04-25 NOTE — TELEPHONE ENCOUNTER
Pt called he is having sx on 5/23 by [facility] urology team he is have prostate sx he does not need a pre op clearance they only recommended a PT referral for him after the sx for his prostatectomy

## 2022-04-26 DIAGNOSIS — Z90.79 STATUS POST PROSTATECTOMY: Primary | ICD-10-CM

## 2022-05-16 DIAGNOSIS — G47.9 SLEEP DISTURBANCE: ICD-10-CM

## 2022-05-16 RX ORDER — ZOLPIDEM TARTRATE 10 MG/1
10 TABLET ORAL
Qty: 30 TABLET | Refills: 0 | Status: SHIPPED | OUTPATIENT
Start: 2022-05-16 | End: 2022-06-20 | Stop reason: SDUPTHER

## 2022-05-17 ENCOUNTER — EVALUATION (OUTPATIENT)
Dept: PHYSICAL THERAPY | Facility: HOME HEALTHCARE | Age: 62
End: 2022-05-17
Payer: COMMERCIAL

## 2022-05-17 ENCOUNTER — TELEPHONE (OUTPATIENT)
Dept: INTERNAL MEDICINE CLINIC | Facility: CLINIC | Age: 62
End: 2022-05-17

## 2022-05-17 DIAGNOSIS — C61 PROSTATE CANCER (HCC): Primary | ICD-10-CM

## 2022-05-17 DIAGNOSIS — Z90.79 STATUS POST PROSTATECTOMY: ICD-10-CM

## 2022-05-17 DIAGNOSIS — N41.9 PROSTATITIS, UNSPECIFIED PROSTATITIS TYPE: ICD-10-CM

## 2022-05-17 DIAGNOSIS — Z01.818 PRE-OPERATIVE EXAM: Primary | ICD-10-CM

## 2022-05-17 PROCEDURE — 97161 PT EVAL LOW COMPLEX 20 MIN: CPT | Performed by: PHYSICAL THERAPIST

## 2022-05-17 PROCEDURE — 97530 THERAPEUTIC ACTIVITIES: CPT | Performed by: PHYSICAL THERAPIST

## 2022-05-17 PROCEDURE — 97110 THERAPEUTIC EXERCISES: CPT | Performed by: PHYSICAL THERAPIST

## 2022-05-17 NOTE — PROGRESS NOTES
PT Evaluation  and PT Discharge    Today's date: 2022  Patient name: Rosetta Connors  : 1960  MRN: [id number]  Referring provider: Hector Shaver DO  Dx:   Encounter Diagnosis     ICD-10-CM    1  Pre-operative exam  Z01 818    2  Status post prostatectomy  Z90 79 Ambulatory Referral to Physical Therapy   3  Prostatitis, unspecified prostatitis type  N41 9                   Assessment  Assessment details: Pt Rosetta Connors is a 62 y o  who presents to OPPT for pre operative assessment for upcoming prostatectomy to be completed on 22  PT providing pt with education on pelvic floor strengthening program, catheter management, and bowel management for post operative state  Pt verbalized understanding of information reviewed this date  [facility] will [facility] from [facility]; will return for pelvic therapy if urinary incontinence is present 4-6 week post operatively  Thank you for this referral    Impairments: impaired physical strength and lacks appropriate home exercise program    Goals  Consult only - no goals set     Plan  Patient would benefit from: skilled physical therapy  Planned therapy interventions: patient education and therapeutic activities  Frequency: 1x week  Duration in weeks: 1  Plan of Care beginning date: 2022  Plan of Care expiration date: 2022  Treatment plan discussed with: patient        PT Pelvic Floor Subjective:   History of Present Illness:   Pt reporting that he had elevated PSA labs last summer  He found a physician in [place] and is scheduled for prostatectomy to be completed on 22  Pt presenting to pelvic floor therapy for pre operative assessment  Bladder Function:     Voiding Difficulties comments:     Urinary leakage: no urine leakage    Painful urination: No    Pain:     No pain reported by patient    Diagnostic Tests:     PSA level: abnormal  Treatments:     Current treatment: physical therapy    Patient Goals:     Patient goals for therapy:  Improved quality of life and improved bladder or bowel function      Objective   Pelvic Floor Exam     Pelvic Floor Muscle Exam     Muscle Contraction: well isolated  Breathing pattern with contraction: holding breath  Pelvic floor muscle relaxation is complete  PERFECT Score   Power right: 5/5  Power left: 5/5               Pt provided education on pelvic floor strengthening program (TE), bowel management and catheter management post operatively (TA)

## 2022-05-17 NOTE — TELEPHONE ENCOUNTER
Kyleigh from [facility] in [place] called  States pt said he is supposed to have pre surgery and post surgery physical therapy  She said the order in his chart is only for the post prostatectomy  She would like to know if an order can be placed for pre-op prostatectomy physical therapy  PT has appt there today  Please advise

## 2022-06-13 DIAGNOSIS — I10 HYPERTENSION, UNSPECIFIED TYPE: ICD-10-CM

## 2022-06-13 RX ORDER — LISINOPRIL 10 MG/1
10 TABLET ORAL DAILY
Qty: 30 TABLET | Refills: 5 | Status: SHIPPED | OUTPATIENT
Start: 2022-06-13

## 2022-06-20 DIAGNOSIS — G47.9 SLEEP DISTURBANCE: ICD-10-CM

## 2022-06-20 RX ORDER — ZOLPIDEM TARTRATE 10 MG/1
10 TABLET ORAL
Qty: 30 TABLET | Refills: 0 | Status: SHIPPED | OUTPATIENT
Start: 2022-06-20 | End: 2022-07-28 | Stop reason: SDUPTHER

## 2022-06-21 DIAGNOSIS — F41.1 GENERALIZED ANXIETY DISORDER: ICD-10-CM

## 2022-06-21 DIAGNOSIS — G47.9 SLEEP DISTURBANCE: ICD-10-CM

## 2022-06-21 RX ORDER — TRAZODONE HYDROCHLORIDE 50 MG/1
50 TABLET ORAL
Qty: 30 TABLET | Refills: 3 | Status: SHIPPED | OUTPATIENT
Start: 2022-06-21

## 2022-06-21 RX ORDER — ESCITALOPRAM OXALATE 20 MG/1
20 TABLET ORAL DAILY
Qty: 30 TABLET | Refills: 5 | Status: SHIPPED | OUTPATIENT
Start: 2022-06-21

## 2022-07-28 DIAGNOSIS — G47.9 SLEEP DISTURBANCE: ICD-10-CM

## 2022-07-28 RX ORDER — ZOLPIDEM TARTRATE 10 MG/1
10 TABLET ORAL
Qty: 30 TABLET | Refills: 0 | Status: SHIPPED | OUTPATIENT
Start: 2022-07-28 | End: 2022-08-26 | Stop reason: SDUPTHER

## 2022-08-26 DIAGNOSIS — G47.9 SLEEP DISTURBANCE: ICD-10-CM

## 2022-08-26 RX ORDER — ZOLPIDEM TARTRATE 10 MG/1
10 TABLET ORAL
Qty: 30 TABLET | Refills: 0 | Status: SHIPPED | OUTPATIENT
Start: 2022-08-26 | End: 2022-10-07 | Stop reason: SDUPTHER

## 2022-08-29 ENCOUNTER — APPOINTMENT (OUTPATIENT)
Dept: LAB | Facility: CLINIC | Age: 62
End: 2022-08-29
Payer: COMMERCIAL

## 2022-08-29 DIAGNOSIS — C61 PROSTATE CANCER (HCC): ICD-10-CM

## 2022-08-29 LAB
25(OH)D3 SERPL-MCNC: 63.2 NG/ML (ref 30–100)
ALBUMIN SERPL BCP-MCNC: 3.6 G/DL (ref 3.5–5)
ALP SERPL-CCNC: 101 U/L (ref 46–116)
ALT SERPL W P-5'-P-CCNC: 22 U/L (ref 12–78)
ANION GAP SERPL CALCULATED.3IONS-SCNC: 3 MMOL/L (ref 4–13)
AST SERPL W P-5'-P-CCNC: 9 U/L (ref 5–45)
BILIRUB SERPL-MCNC: 0.68 MG/DL (ref 0.2–1)
BUN SERPL-MCNC: 21 MG/DL (ref 5–25)
CALCIUM SERPL-MCNC: 8.9 MG/DL (ref 8.3–10.1)
CHLORIDE SERPL-SCNC: 111 MMOL/L (ref 96–108)
CO2 SERPL-SCNC: 27 MMOL/L (ref 21–32)
CREAT SERPL-MCNC: 0.7 MG/DL (ref 0.6–1.3)
GFR SERPL CREATININE-BSD FRML MDRD: 102 ML/MIN/1.73SQ M
GLUCOSE P FAST SERPL-MCNC: 86 MG/DL (ref 65–99)
LDLC SERPL DIRECT ASSAY-MCNC: 103 MG/DL (ref 0–100)
POTASSIUM SERPL-SCNC: 4.2 MMOL/L (ref 3.5–5.3)
PROT SERPL-MCNC: 6.4 G/DL (ref 6.4–8.4)
PSA SERPL-MCNC: <0.1 NG/ML (ref 0–4)
SODIUM SERPL-SCNC: 141 MMOL/L (ref 135–147)
TRIGL SERPL-MCNC: 60 MG/DL

## 2022-10-07 DIAGNOSIS — G47.9 SLEEP DISTURBANCE: ICD-10-CM

## 2022-10-07 RX ORDER — ZOLPIDEM TARTRATE 10 MG/1
10 TABLET ORAL
Qty: 30 TABLET | Refills: 0 | Status: SHIPPED | OUTPATIENT
Start: 2022-10-07

## 2022-10-24 DIAGNOSIS — G47.9 SLEEP DISTURBANCE: ICD-10-CM

## 2022-10-24 RX ORDER — TRAZODONE HYDROCHLORIDE 50 MG/1
TABLET ORAL
Qty: 30 TABLET | Refills: 3 | Status: SHIPPED | OUTPATIENT
Start: 2022-10-24

## 2022-11-25 DIAGNOSIS — G47.9 SLEEP DISTURBANCE: ICD-10-CM

## 2022-11-25 RX ORDER — ZOLPIDEM TARTRATE 10 MG/1
10 TABLET ORAL
Qty: 30 TABLET | Refills: 0 | Status: SHIPPED | OUTPATIENT
Start: 2022-11-25

## 2022-12-22 NOTE — TELEPHONE ENCOUNTER
Patient states c/o  elbow and shoulder limpness, disorientation, and chest tightness over the last few days.     Care Advice given to Go to ED Now for evaluation/treatment. Patient states understanding of care advice and cites no additional concerns at this time.      Reason for Disposition   Chest pain lasting longer than 5 minutes and occurred in last 3 days (72 hours) (Exception: feels exactly the same as previously diagnosed heartburn and has accompanying sour taste in mouth)    Additional Information   Negative: SEVERE difficulty breathing (e.g., struggling for each breath, speaks in single words)   Negative: Passed out (i.e., fainted, collapsed and was not responding)   Negative: Difficult to awaken or acting confused (e.g., disoriented, slurred speech)   Negative: Shock suspected (e.g., cold/pale/clammy skin, too weak to stand, low BP, rapid pulse)   Negative: Chest pain lasting longer than 5 minutes and ANY of the following:* Over 44 years old* Over 30 years old and at least one cardiac risk factor (e.g., diabetes mellitus, high blood pressure, high cholesterol, smoker, or strong family history of heart disease)* History of heart disease (i.e., angina, heart attack, heart failure, bypass surgery, takes nitroglycerin)* Pain is crushing, pressure-like, or heavy   Negative: Heart beating < 50 beats per minute OR > 140 beats per minute   Negative: Visible sweat on face or sweat dripping down face   Negative: Sounds like a life-threatening emergency to the triager   Negative: Followed an injury to chest   Negative: SEVERE chest pain   Negative: Pain also in shoulder(s) or arm(s) or jaw   Negative: Difficulty breathing   Negative: Cocaine use within last 3 days   Negative: Major surgery in the past month   Negative: Hip or leg fracture (broken bone) in past month (or had cast on leg or ankle in past month)   Negative: Illness requiring prolonged bedrest in past month (e.g., immobilization, long hospital stay)    Refill Escitalopram          [facility] Negative: Long-distance travel in past month (e.g., car, bus, train, plane; with trip lasting 6 or more hours)   Negative: History of prior 'blood clot' in leg or lungs (i.e., deep vein thrombosis, pulmonary embolism)   Negative: History of inherited increased risk of blood clots (e.g., Factor 5 Leiden, Anti-thrombin 3, Protein C or Protein S deficiency, Prothrombin mutation)   Negative: Cancer treatment in the past two months (or has cancer now)   Negative: Heart beating irregularly or very rapidly    Protocols used: Chest Pain-A-[place]

## 2022-12-27 DIAGNOSIS — G47.9 SLEEP DISTURBANCE: ICD-10-CM

## 2022-12-27 RX ORDER — ZOLPIDEM TARTRATE 10 MG/1
10 TABLET ORAL
Qty: 30 TABLET | Refills: 0 | Status: SHIPPED | OUTPATIENT
Start: 2022-12-27

## 2023-01-21 DIAGNOSIS — I10 HYPERTENSION, UNSPECIFIED TYPE: ICD-10-CM

## 2023-01-21 RX ORDER — LISINOPRIL 10 MG/1
TABLET ORAL
Qty: 30 TABLET | Refills: 0 | Status: SHIPPED | OUTPATIENT
Start: 2023-01-21

## 2023-01-26 DIAGNOSIS — G47.9 SLEEP DISTURBANCE: ICD-10-CM

## 2023-01-26 RX ORDER — ZOLPIDEM TARTRATE 10 MG/1
10 TABLET ORAL
Qty: 30 TABLET | Refills: 0 | OUTPATIENT
Start: 2023-01-26

## 2023-01-26 NOTE — TELEPHONE ENCOUNTER
I called and lm for pt - his rx was refused for a refill - due to the fact that the needs an appt with the

## 2023-01-30 DIAGNOSIS — G47.9 SLEEP DISTURBANCE: ICD-10-CM

## 2023-01-30 RX ORDER — ZOLPIDEM TARTRATE 10 MG/1
TABLET ORAL
Qty: 30 TABLET | OUTPATIENT
Start: 2023-01-30

## 2023-01-30 NOTE — TELEPHONE ENCOUNTER
Medication: Ambien    PDMP        [facility]  Please forward to your office staff for further review as this medication was reviewed by a [facility] RN

## 2023-02-02 ENCOUNTER — OFFICE VISIT (OUTPATIENT)
Dept: INTERNAL MEDICINE CLINIC | Facility: CLINIC | Age: 63
End: 2023-02-02

## 2023-02-02 VITALS
DIASTOLIC BLOOD PRESSURE: 68 MMHG | TEMPERATURE: 97.6 F | HEART RATE: 63 BPM | WEIGHT: 315 LBS | BODY MASS INDEX: 49.44 KG/M2 | OXYGEN SATURATION: 97 % | HEIGHT: 67 IN | SYSTOLIC BLOOD PRESSURE: 128 MMHG

## 2023-02-02 DIAGNOSIS — E55.9 VITAMIN D DEFICIENCY: ICD-10-CM

## 2023-02-02 DIAGNOSIS — F41.1 GENERALIZED ANXIETY DISORDER: ICD-10-CM

## 2023-02-02 DIAGNOSIS — C43.9 MALIGNANT MELANOMA OF SKIN (HCC): ICD-10-CM

## 2023-02-02 DIAGNOSIS — Z23 ENCOUNTER FOR VACCINATION: ICD-10-CM

## 2023-02-02 DIAGNOSIS — Z13.220 SCREENING FOR LIPID DISORDERS: ICD-10-CM

## 2023-02-02 DIAGNOSIS — Z00.00 WELL ADULT EXAM: Primary | ICD-10-CM

## 2023-02-02 DIAGNOSIS — Z13.1 SCREENING FOR DIABETES MELLITUS (DM): ICD-10-CM

## 2023-02-02 DIAGNOSIS — E78.2 MIXED HYPERLIPIDEMIA: ICD-10-CM

## 2023-02-02 DIAGNOSIS — Z98.84 S/P GASTRIC BYPASS: ICD-10-CM

## 2023-02-02 DIAGNOSIS — Z13.0 SCREENING FOR DEFICIENCY ANEMIA: ICD-10-CM

## 2023-02-02 DIAGNOSIS — C61 MALIGNANT NEOPLASM OF PROSTATE (HCC): ICD-10-CM

## 2023-02-02 DIAGNOSIS — G47.9 SLEEP DISTURBANCE: ICD-10-CM

## 2023-02-02 DIAGNOSIS — I10 ESSENTIAL HYPERTENSION: ICD-10-CM

## 2023-02-02 DIAGNOSIS — G47.33 OBSTRUCTIVE SLEEP APNEA: ICD-10-CM

## 2023-02-02 DIAGNOSIS — Z13.29 SCREENING FOR THYROID DISORDER: ICD-10-CM

## 2023-02-02 PROBLEM — R97.20 ELEVATED PSA: Status: RESOLVED | Noted: 2021-08-27 | Resolved: 2023-02-02

## 2023-02-02 RX ORDER — ZOLPIDEM TARTRATE 10 MG/1
10 TABLET ORAL
Qty: 30 TABLET | Refills: 0 | Status: SHIPPED | OUTPATIENT
Start: 2023-02-02

## 2023-02-02 NOTE — PROGRESS NOTES
BMI Counseling: There is no height or weight on file to calculate BMI  The BMI is above normal  Nutrition recommendations include decreasing portion sizes and encouraging healthy choices of fruits and vegetables  Exercise recommendations include moderate physical activity 150 minutes/week  No pharmacotherapy was ordered  Rationale for BMI follow-up plan is due to patient being overweight or obese       Assessment/Plan:  Problem List Items Addressed This Visit        Respiratory    Obstructive sleep apnea       Cardiovascular and Mediastinum    Essential hypertension    Relevant Orders    Comprehensive metabolic panel    Microalbumin / creatinine urine ratio       Musculoskeletal and Integument    Malignant melanoma of skin (HCC)       Genitourinary    Malignant neoplasm of prostate (HCC)       Other    Vitamin D deficiency    Relevant Orders    Vitamin D 25 hydroxy    S/P gastric bypass    Relevant Orders    Vitamin A    Vitamin B1, whole blood    Vitamin B12/Folate, Serum Panel    Vitamin B6    Vitamin D 25 hydroxy    Iron Panel (Includes Ferritin, Iron Sat%, Iron, and TIBC)    Mixed hyperlipidemia    Generalized anxiety disorder    Relevant Medications    zolpidem (AMBIEN) 10 mg tablet    Other Relevant Orders    TSH, 3rd generation with Free T4 reflex   Other Visit Diagnoses     Well adult exam    -  Primary    Relevant Orders    CBC and differential    Comprehensive metabolic panel    Hemoglobin A1C    Lipid Panel with Direct LDL reflex    TSH, 3rd generation with Free T4 reflex    Screening for lipid disorders        Relevant Orders    Lipid Panel with Direct LDL reflex    Screening for diabetes mellitus (DM)        Relevant Orders    Hemoglobin A1C    Screening for thyroid disorder        Relevant Orders    TSH, 3rd generation with Free T4 reflex    Screening for deficiency anemia        Relevant Orders    CBC and differential    Iron Panel (Includes Ferritin, Iron Sat%, Iron, and TIBC)    Encounter for vaccination        Relevant Medications    Zoster Vac Recomb Adjuvanted 50 MCG/0 5ML SUSR    Sleep disturbance        Relevant Medications    zolpidem (AMBIEN) 10 mg tablet           Diagnoses and all orders for this visit:    Well adult exam  -     CBC and differential; Future  -     Comprehensive metabolic panel; Future  -     Hemoglobin A1C; Future  -     Lipid Panel with Direct LDL reflex; Future  -     TSH, 3rd generation with Free T4 reflex; Future    Essential hypertension  -     Comprehensive metabolic panel; Future  -     Microalbumin / creatinine urine ratio    Obstructive sleep apnea    Malignant melanoma of skin (HCC)    Malignant neoplasm of prostate (HCC)    Generalized anxiety disorder  -     TSH, 3rd generation with Free T4 reflex; Future    Mixed hyperlipidemia    Vitamin D deficiency  -     Vitamin D 25 hydroxy; Future    S/P gastric bypass  -     Vitamin A; Future  -     Vitamin B1, whole blood; Future  -     Vitamin B12/Folate, Serum Panel; Future  -     Vitamin B6; Future  -     Vitamin D 25 hydroxy; Future  -     Iron Panel (Includes Ferritin, Iron Sat%, Iron, and TIBC); Future    Screening for lipid disorders  -     Lipid Panel with Direct LDL reflex; Future    Screening for diabetes mellitus (DM)  -     Hemoglobin A1C; Future    Screening for thyroid disorder  -     TSH, 3rd generation with Free T4 reflex; Future    Screening for deficiency anemia  -     CBC and differential; Future  -     Iron Panel (Includes Ferritin, Iron Sat%, Iron, and TIBC); Future    Encounter for vaccination  -     Zoster Vac Recomb Adjuvanted 50 MCG/0 5ML SUSR; Inject 1 Dose into a muscle once for 1 dose    Sleep disturbance  -     zolpidem (AMBIEN) 10 mg tablet; Take 1 tablet (10 mg total) by mouth daily at bedtime as needed for sleep      No problem-specific Assessment & Plan notes found for this encounter  A/P: Doing well and co-morbidities are stable  Labs will be ordered   Discussed BMI and will give info on diet and exercise  Defers the  flu vaccine, but will get the HZV  Wyonia Caller Check gastric bypass labs    No mental or physical restrictions  No evidence of communicable diseases  Continue current treatment and RTC one year for annual and four months for routine           Subjective:      Patient ID: Melida Cabrera is a 65 y o  male  WM presents for a well exam  Doing ok and no c/o's  ELIDIA is manageable despite still waiting for a new CPAP machine Prostate Ca is in remission  Chronic pain is manageable  BRITTANI is controlled  Still needs meds for sleep  Remains active w/o difficulty and no falls  Denies depression  No suicidal or violent ideations  Working full time at the high school  No recent travel  No fever, chills, or sweats  No unexplained wt change  Denies CP, SOB, palpitations, edema, orthopnea, or PND  No sz or syncope  No changes in bowel or bladder habits  No trouble swallowing  Appetite and sleep are good  See both a DDS and an eye doctor  No change in PMH, PSH, ALL, OH, SH, or Fhx  Currently due for labs and vaccines   The following portions of the patient's history were reviewed and updated as appropriate:   He has a past medical history of Arthritis, 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[facility] [address] ), Cancer ([facility] [address] ), COVID-19, DVT (deep venous thrombosis) ([facility] [address] ), Generalized anxiety disorder, Hypertension, Melanoma ([facility] [address] ), Mobitz (type) I (Wenckebach's) atrioventricular block, Obesity, Primary osteoarthritis of both knees, and Sleep apnea  ,  does not have any pertinent problems on file  ,   has a past surgical history that includes Knee surgery (Bilateral, 2006, 2008); Knee arthroscopy; Mohs surgery; Tonsillectomy and adenoidectomy; Uvulectomy; Joint replacement (Bilateral); GASTRECTOMY SLEEVE LAPAROSCOPIC (2019); Colonoscopy; and Abdominal surgery  ,  family history includes Arthritis in his maternal grandmother; Cancer in his mother; Heart disease in his father, maternal grandmother, other, and paternal grandmother; Hypotension in his mother; Liver cancer in his other and paternal grandfather; Lung cancer in his other; Other in his brother; Prostate cancer in his family and other; Stroke in his maternal grandfather, maternal grandmother, and mother; Thyroid cancer in his mother  ,   reports that he has never smoked  He has never used smokeless tobacco  He reports current alcohol use  He reports that he does not use drugs  ,  is allergic to penicillin v   Current Outpatient Medications   Medication Sig Dispense Refill   • aspirin (ECOTRIN LOW STRENGTH) 81 mg EC tablet Take 81 mg by mouth daily     • escitalopram (LEXAPRO) 20 mg tablet take 1 tablet by mouth once daily 30 tablet 5   • ketoconazole (NIZORAL) 2 % shampoo       • lisinopril (ZESTRIL) 10 mg tablet take 1 tablet by mouth once daily 30 tablet 0   • Multiple Vitamins-Minerals (multivitamin with minerals) tablet Take 1 tablet by mouth daily     • traZODone (DESYREL) 50 mg tablet take 1 tablet by mouth once daily at bedtime 30 tablet 3   • zolpidem (AMBIEN) 10 mg tablet Take 1 tablet (10 mg total) by mouth daily at bedtime as needed for sleep 30 tablet 0   • Zoster Vac Recomb Adjuvanted 50 MCG/0 5ML SUSR Inject 1 Dose into a muscle once for 1 dose 1 each 1   • Calcium Carbonate-Vitamin D 600-400 MG-UNIT per tablet Take 1 tablet by mouth       No current facility-administered medications for this visit  Review of Systems   Constitutional: Negative for activity change, chills, diaphoresis, fatigue and fever  HENT: Negative  Eyes: Negative for visual disturbance  Respiratory: Negative for cough, chest tightness, shortness of breath and wheezing  Cardiovascular: Negative for chest pain, palpitations and leg swelling  Gastrointestinal: Negative for abdominal pain, constipation, diarrhea, nausea and vomiting  Endocrine: Negative for cold intolerance and heat intolerance  Genitourinary: Negative for difficulty urinating, dysuria and frequency  Musculoskeletal: Negative for arthralgias, gait problem and myalgias  Neurological: Negative for dizziness, seizures, syncope, weakness, light-headedness and headaches  Psychiatric/Behavioral: Positive for sleep disturbance  Negative for confusion and dysphoric mood  The patient is not nervous/anxious  PHQ-2/9 Depression Screening    Little interest or pleasure in doing things: 0 - not at all  Feeling down, depressed, or hopeless: 0 - not at all        Objective:  Vitals:    02/02/23 1446   BP: 128/68   BP Location: Left arm   Patient Position: Sitting   Cuff Size: Standard   Pulse: 63   Temp: 97 6 °F (36 4 °C)   SpO2: 97%   Weight: (!) 143 kg (316 lb)   Height: 5' 7" (1 702 m)     Body mass index is 49 49 kg/m²  Physical Exam  Vitals and nursing note reviewed  Constitutional:       General: He is not in acute distress  Appearance: Normal appearance  He is obese  He is not ill-appearing  HENT:      Head: Normocephalic and atraumatic  Mouth/Throat:      Mouth: Mucous membranes are moist    Eyes:      Extraocular Movements: Extraocular movements intact  Conjunctiva/sclera: Conjunctivae normal       Pupils: Pupils are equal, round, and reactive to light  Neck:      Vascular: No carotid bruit  Cardiovascular:      Rate and Rhythm: Normal rate and regular rhythm  Heart sounds: Normal heart sounds  No murmur heard  Pulmonary:      Effort: Pulmonary effort is normal  No respiratory distress  Breath sounds: Normal breath sounds  No wheezing, rhonchi or rales  Abdominal:      General: Bowel sounds are normal  There is no distension  Palpations: Abdomen is soft  Tenderness: There is no abdominal tenderness  Musculoskeletal:      Cervical back: Neck supple  Right lower leg: No edema  Left lower leg: No edema  Neurological:      General: No focal deficit present  Mental Status: He is alert and oriented to person, place, and time   Mental status is at baseline  Psychiatric:         Mood and Affect: Mood normal          Behavior: Behavior normal          Thought Content: Thought content normal          Judgment: Judgment normal          BMI Counseling: Body mass index is 49 49 kg/m²  The BMI is above normal  Nutrition recommendations include reducing portion sizes, decreasing overall calorie intake, reducing intake of saturated fat and trans fat and reducing intake of cholesterol  Exercise recommendations include moderate aerobic physical activity for 150 minutes/week

## 2023-02-02 NOTE — PATIENT INSTRUCTIONS
Obesity   AMBULATORY CARE:   Obesity  means your body mass index (BMI) is greater than 30  Your healthcare provider will use your height and weight to measure your BMI  The risks of obesity include  many health problems, including injuries or physical disability  · Diabetes (high blood sugar level)    · High blood pressure or high cholesterol    · Heart disease    · Stroke    · Gallbladder or liver disease    · Cancer of the colon, breast, prostate, liver, or kidney    · Sleep apnea    · Arthritis or gout    Screening  is done to check for health conditions before you have signs or symptoms  If you are 28to 79years old, your blood sugar level may be checked every 3 years for signs of prediabetes or diabetes  Your healthcare provider will check your blood pressure at each visit  High blood pressure can lead to a stroke or other problems  Your provider may check for signs of heart disease, cancer, or other health problems  Seek care immediately if:   · You have a severe headache, confusion, or difficulty speaking  · You have weakness on one side of your body  · You have chest pain, sweating, or shortness of breath  Call your doctor if:   · You have symptoms of gallbladder or liver disease, such as pain in your upper abdomen  · You have knee or hip pain and discomfort while walking  · You have symptoms of diabetes, such as intense hunger and thirst, and frequent urination  · You have symptoms of sleep apnea, such as snoring or daytime sleepiness  · You have questions or concerns about your condition or care  Treatment for obesity  focuses on helping you lose weight to improve your health  Even a small decrease in BMI can reduce the risk for many health problems  Your healthcare provider will help you set a weight-loss goal   · Lifestyle changes  are the first step in treating obesity  These include making healthy food choices and getting regular physical activity   Your healthcare provider may suggest a weight-loss program that involves coaching, education, and therapy  · Medicine  may help you lose weight when it is used with a healthy foods and physical activity  · Surgery  can help you lose weight if you are very obese and have other health problems  There are several types of weight-loss surgery  Ask your healthcare provider for more information  Tips for safe weight loss:   · Set small, realistic goals  An example of a small goal is to walk for 20 minutes 5 days a week  Anther goal is to lose 5% of your body weight  · Tell friends, family members, and coworkers about your goals  and ask for their support  Ask a friend to lose weight with you, or join a weight-loss support group  · Identify foods or triggers that may cause you to overeat , and find ways to avoid them  Remove tempting high-calorie foods from your home and workplace  Place a bowl of fresh fruit on your kitchen counter  If stress causes you to eat, then find other ways to cope with stress  A counselor or therapist may be able to help you  · Keep a diary to track what you eat and drink  Also write down how many minutes of physical activity you do each day  Weigh yourself once a week and record it in your diary  Eating changes: You will need to eat 500 to 1,000 fewer calories each day than you currently eat to lose 1 to 2 pounds a week  The following changes will help you cut calories:  · Eat smaller portions  Use small plates, no larger than 9 inches in diameter  Fill your plate half full of fruits and vegetables  Measure your food using measuring cups until you know what a serving size looks like  · Eat 3 meals and 1 or 2 snacks each day  Plan your meals in advance  Artemio Junior and eat at home most of the time  Eat slowly  Do not skip meals  Skipping meals can lead to overeating later in the day  This can make it harder for you to lose weight   Talk with a dietitian to help you make a meal plan and schedule that is right for you  · Eat fruits and vegetables at every meal   They are low in calories and high in fiber, which makes you feel full  Do not add butter, margarine, or cream sauce to vegetables  Use herbs to season steamed vegetables  · Eat less fat and fewer fried foods  Eat more baked or grilled chicken and fish  These protein sources are lower in calories and fat than red meat  Limit fast food  Dress your salads with olive oil and vinegar instead of bottled dressing  · Limit the amount of sugar you eat  Do not drink sugary beverages  Limit alcohol  Activity changes:  Physical activity is good for your body in many ways  It helps you burn calories and build strong muscles  It decreases stress and depression, and improves your mood  It can also help you sleep better  Talk to your healthcare provider before you begin an exercise program   · Exercise for at least 30 minutes 5 days a week  Start slowly  Set aside time each day for physical activity that you enjoy and that is convenient for you  It is best to do both weight training and an activity that increases your heart rate, such as walking, bicycling, or swimming  · Find ways to be more active  Do yard work and housecleaning  Walk up the stairs instead of using elevators  Spend your leisure time going to events that require walking, such as outdoor festivals or fairs  This extra physical activity can help you lose weight and keep it off  Follow up with your doctor as directed: You may need to meet with a dietitian  Write down your questions so you remember to ask them during your visits  © Copyright [employer] 2022 Information is for End User's use only and may not be sold, redistributed or otherwise used for commercial purposes  All illustrations and images included in CareNotes® are the copyrighted property of A D A M , Inc  or Regino Moon   The above information is an  only   It is not intended as medical advice for individual conditions or treatments  Talk to your doctor, nurse or pharmacist before following any medical regimen to see if it is safe and effective for you  Low Fat Diet   AMBULATORY CARE:   A low-fat diet  is an eating plan that is low in total fat, unhealthy fat, and cholesterol  You may need to follow a low-fat diet if you have trouble digesting or absorbing fat  You may also need to follow this diet if you have high cholesterol  You can also lower your cholesterol by increasing the amount of fiber in your diet  Soluble fiber is a type of fiber that helps to decrease cholesterol levels  Different types of fat in food:   · Limit unhealthy fats  A diet that is high in cholesterol, saturated fat, and trans fat may cause unhealthy cholesterol levels  Unhealthy cholesterol levels increase your risk of heart disease  ? Cholesterol:  Limit intake of cholesterol to less than 200 mg per day  Cholesterol is found in meat, eggs, and dairy  ? Saturated fat:  Limit saturated fat to less than 7% of your total daily calories  Ask your dietitian how many calories you need each day  Saturated fat is found in butter, cheese, ice cream, whole milk, and palm oil  Saturated fat is also found in meat, such as beef, pork, chicken skin, and processed meats  Processed meats include sausage, hot dogs, and bologna  ? Trans fat:  Avoid trans fat as much as possible  Trans fat is used in fried and baked foods  Foods that say trans fat free on the label may still have up to 0 5 grams of trans fat per serving  · Include healthy fats  Replace foods that are high in saturated and trans fat with foods high in healthy fats  This may help to decrease high cholesterol levels  ? Monounsaturated fats: These are found in avocados, nuts, and vegetable oils, such as olive, canola, and sunflower oil  ? Polyunsaturated fats: These can be found in vegetable oils, such as soybean or corn oil   Omega-3 fats can help to decrease the risk of heart disease  Omega-3 fats are found in fish, such as salmon, herring, trout, and tuna  Omega-3 fats can also be found in plant foods, such as walnuts, flaxseed, soybeans, and canola oil  Foods to limit or avoid:   · Grains:      ? Snacks that are made with partially hydrogenated oils, such as chips, regular crackers, and butter-flavored popcorn    ? High-fat baked goods, such as biscuits, croissants, doughnuts, pies, cookies, and pastries    · Dairy:      ? Whole milk, 2% milk, and yogurt and ice cream made with whole milk    ? Half and half creamer, heavy cream, and whipping cream    ? Cheese, cream cheese, and sour cream    · Meats and proteins:      ? High-fat cuts of meat (T-bone steak, regular hamburger, and ribs)    ? Fried meat, poultry (turkey and chicken), and fish    ? Poultry (chicken and turkey) with skin    ? Cold cuts (salami or bologna), hot dogs, dela cruz, and sausage    ? Whole eggs and egg yolks    · Vegetables and fruits with added fat:      ? Fried vegetables or vegetables in butter or high-fat sauces, such as cream or cheese sauces    ? Fried fruit or fruit served with butter or cream    · Fats:      ? Butter, stick margarine, and shortening    ? Coconut, palm oil, and palm kernel oil    Foods to include:   · Grains:      ? Whole-grain breads, cereals, pasta, and brown rice    ? Low-fat crackers and pretzels    · Vegetables and fruits:      ? Fresh, frozen, or canned vegetables (no salt or low-sodium)    ? Fresh, frozen, dried, or canned fruit (canned in light syrup or fruit juice)    ? Avocado    · Low-fat dairy products:      ? Nonfat (skim) or 1% milk    ? Nonfat or low-fat cheese, yogurt, and cottage cheese    · Meats and proteins:      ? Chicken or turkey with no skin    ? Baked or broiled fish    ? Lean beef and pork (loin, round, extra lean hamburger)    ? Beans and peas, unsalted nuts, soy products    ? Egg whites and substitutes    ?  Seeds and nuts    · Fats:      ? Unsaturated oil, such as canola, olive, peanut, soybean, or sunflower oil    ? Soft or liquid margarine and vegetable oil spread    ? Low-fat salad dressing    Other ways to decrease fat:   · Read food labels before you buy foods  Choose foods that have less than 30% of calories from fat  Choose low-fat or fat-free dairy products  Remember that fat free does not mean calorie free  These foods still contain calories, and too many calories can lead to weight gain  · Trim fat from meat and avoid fried food  Trim all visible fat from meat before you cook it  Remove the skin from poultry  Do not acuna meat, fish, or poultry  Bake, roast, boil, or broil these foods instead  Avoid fried foods  Eat a baked potato instead of [place] fries  Steam vegetables instead of sautéing them in butter  · Add less fat to foods  Use imitation dela cruz bits on salads and baked potatoes instead of regular dela cruz bits  Use fat-free or low-fat salad dressings instead of regular dressings  Use low-fat or nonfat butter-flavored topping instead of regular butter or margarine on popcorn and other foods  Ways to decrease fat in recipes:  Replace high-fat ingredients with low-fat or nonfat ones  This may cause baked goods to be drier than usual  You may need to use nonfat cooking spray on pans to prevent food from sticking  You also may need to change the amount of other ingredients, such as water, in the recipe  Try the following:  · Use low-fat or light margarine instead of regular margarine or shortening  · Use lean ground turkey breast or chicken, or lean ground beef (less than 5% fat) instead of hamburger  · Add 1 teaspoon of canola oil to 8 ounces of skim milk instead of using cream or half and half  · Use grated zucchini, carrots, or apples in breads instead of coconut  · Use blenderized, low-fat cottage cheese, plain tofu, or low-fat ricotta cheese instead of cream cheese       · Use 1 egg white and 1 teaspoon of canola oil, or use ¼ cup (2 ounces) of fat-free egg substitute instead of a whole egg  · Replace half of the oil that is called for in a recipe with applesauce when you bake  Use 3 tablespoons of cocoa powder and 1 tablespoon of canola oil instead of a square of baking chocolate  How to increase fiber:  Eat enough high-fiber foods to get 20 to 30 grams of fiber every day  Slowly increase your fiber intake to avoid stomach cramps, gas, and other problems  · Eat 3 ounces of whole-grain foods each day  An ounce is about 1 slice of bread  Eat whole-grain breads, such as whole-wheat bread  Whole wheat, whole-wheat flour, or other whole grains should be listed as the first ingredient on the food label  Replace white flour with whole-grain flour or use half of each in recipes  Whole-grain flour is heavier than white flour, so you may have to add more yeast or baking powder  · Eat a high-fiber cereal for breakfast   Oatmeal is a good source of soluble fiber  Look for cereals that have bran or fiber in the name  Choose whole-grain products, such as brown rice, barley, and whole-wheat pasta  · Eat more beans, peas, and lentils  For example, add beans to soups or salads  Eat at least 5 cups of fruits and vegetables each day  Eat fruits and vegetables with the peel because the peel is high in fiber  © Copyright [employer] 2022 Information is for End User's use only and may not be sold, redistributed or otherwise used for commercial purposes  All illustrations and images included in CareNotes® are the copyrighted property of A D A M , Inc  or [address] [place], [place] [address]nicole richard   The above information is an  only  It is not intended as medical advice for individual conditions or treatments  Talk to your doctor, nurse or pharmacist before following any medical regimen to see if it is safe and effective for you      Heart Healthy Diet   AMBULATORY CARE:   A heart healthy diet  is an eating plan low in unhealthy fats and sodium (salt)  The plan is high in healthy fats and fiber  A heart healthy diet helps improve your cholesterol levels and lowers your risk for heart disease and stroke  A dietitian will teach you how to read and understand food labels  Heart healthy diet guidelines to follow:   · Choose foods that contain healthy fats  ? Unsaturated fats  include monounsaturated and polyunsaturated fats  Unsaturated fat is found in foods such as soybean, canola, olive, corn, and safflower oils  It is also found in soft tub margarine that is made with liquid vegetable oil  ? Omega-3 fat  is found in certain fish, such as salmon, tuna, and trout, and in walnuts and flaxseed  Eat fish high in omega-3 fats at least 2 times a week  · Get 20 to 30 grams of fiber each day  Fruits, vegetables, whole-grain foods, and legumes (cooked beans) are good sources of fiber  · Limit or do not have unhealthy fats  ? Cholesterol  is found in animal foods, such as eggs and lobster, and in dairy products made from whole milk  Limit cholesterol to less than 200 mg each day  ? Saturated fat  is found in meats, such as dela cruz and hamburger  It is also found in chicken or turkey skin, whole milk, and butter  Limit saturated fat to less than 7% of your total daily calories  ? Trans fat  is found in packaged foods, such as potato chips and cookies  It is also in hard margarine, some fried foods, and shortening  Do not eat foods that contain trans fats  · Limit sodium as directed  You may be told to limit sodium to 2,000 to 2,300 mg each day  Choose low-sodium or no-salt-added foods  Add little or no salt to food you prepare  Use herbs and spices in place of salt  Include the following in your heart healthy plan:  Ask your dietitian or healthcare provider how many servings to have from each of the following food groups:  · Grains:      ?  Whole-wheat breads, cereals, and pastas, and Marco Loose rice    ? Low-fat, low-sodium crackers and chips    · Vegetables:      ? Broccoli, green beans, green peas, and spinach    ? Collards, kale, and lima beans    ? Carrots, sweet potatoes, tomatoes, and peppers    ? Canned vegetables with no salt added    · Fruits:      ? Bananas, peaches, pears, and pineapple    ? Grapes, raisins, and dates    ? Oranges, tangerines, grapefruit, orange juice, and grapefruit juice    ? Apricots, mangoes, melons, and papaya    ? Raspberries and strawberries    ? Canned fruit with no added sugar    · Low-fat dairy:      ? Nonfat (skim) milk, 1% milk, and low-fat almond, cashew, or soy milks fortified with calcium    ? Low-fat cheese, regular or frozen yogurt, and cottage cheese    · Meats and proteins:      ? Lean cuts of beef and pork (loin, leg, round), skinless chicken and turkey    ? Legumes, soy products, egg whites, or nuts    Limit or do not include the following in your heart healthy plan:   · Unhealthy fats and oils:      ? Whole or 2% milk, cream cheese, sour cream, or cheese    ? High-fat cuts of beef (T-bone steaks, ribs), chicken or turkey with skin, and organ meats such as liver    ? Butter, stick margarine, shortening, and cooking oils such as coconut or palm oil    · Foods and liquids high in sodium:      ? Packaged foods, such as frozen dinners, cookies, macaroni and cheese, and cereals with more than 300 mg of sodium per serving    ? Vegetables with added sodium, such as instant potatoes, vegetables with added sauces, or regular canned vegetables    ? Cured or smoked meats, such as hot dogs, dela cruz, and sausage    ? High-sodium ketchup, barbecue sauce, salad dressing, pickles, olives, soy sauce, or miso    · Foods and liquids high in sugar:      ? Candy, cake, cookies, pies, or doughnuts    ? Soft drinks (soda), sports drinks, or sweetened tea    ? Canned or dry mixes for cakes, soups, sauces, or gravies    Other healthy heart guidelines:   · Do not smoke    Nicotine and other chemicals in cigarettes and cigars can cause lung and heart damage  Ask your healthcare provider for information if you currently smoke and need help to quit  E-cigarettes or smokeless tobacco still contain nicotine  Talk to your healthcare provider before you use these products  · Limit or do not drink alcohol as directed  Alcohol can damage your heart and raise your blood pressure  Your healthcare provider may give you specific daily and weekly limits  The general recommended limit is 1 drink a day for women 21 or older and for men 72 or older  Do not have more than 3 drinks in a day or 7 in a week  The recommended limit is 2 drinks a day for men 24to 59years of age  Do not have more than 4 drinks in a day or 14 in a week  A drink of alcohol is 12 ounces of beer, 5 ounces of wine, or 1½ ounces of liquor  · Exercise regularly  Exercise can help you maintain a healthy weight and improve your blood pressure and cholesterol levels  Regular exercise can also decrease your risk for heart problems  Ask your healthcare provider about the best exercise plan for you  Do not start an exercise program without asking your healthcare provider  Follow up with your doctor or cardiologist as directed:  Write down your questions so you remember to ask them during your visits  © Copyright [employer] 2022 Information is for End User's use only and may not be sold, redistributed or otherwise used for commercial purposes  All illustrations and images included in CareNotes® are the copyrighted property of A D A M , Inc  or [employer]  The above information is an  only  It is not intended as medical advice for individual conditions or treatments  Talk to your doctor, nurse or pharmacist before following any medical regimen to see if it is safe and effective for you

## 2023-02-19 DIAGNOSIS — I10 HYPERTENSION, UNSPECIFIED TYPE: ICD-10-CM

## 2023-02-19 RX ORDER — LISINOPRIL 10 MG/1
TABLET ORAL
Qty: 30 TABLET | Refills: 0 | Status: SHIPPED | OUTPATIENT
Start: 2023-02-19

## 2023-03-06 DIAGNOSIS — G47.9 SLEEP DISTURBANCE: ICD-10-CM

## 2023-03-06 RX ORDER — ZOLPIDEM TARTRATE 10 MG/1
10 TABLET ORAL
Qty: 30 TABLET | Refills: 0 | Status: SHIPPED | OUTPATIENT
Start: 2023-03-06

## 2023-03-09 DIAGNOSIS — G47.9 SLEEP DISTURBANCE: ICD-10-CM

## 2023-03-09 RX ORDER — TRAZODONE HYDROCHLORIDE 50 MG/1
TABLET ORAL
Qty: 30 TABLET | Refills: 3 | Status: SHIPPED | OUTPATIENT
Start: 2023-03-09

## 2023-03-21 DIAGNOSIS — I10 HYPERTENSION, UNSPECIFIED TYPE: ICD-10-CM

## 2023-03-21 RX ORDER — LISINOPRIL 10 MG/1
TABLET ORAL
Qty: 30 TABLET | Refills: 0 | Status: SHIPPED | OUTPATIENT
Start: 2023-03-21

## 2023-04-04 DIAGNOSIS — G47.9 SLEEP DISTURBANCE: ICD-10-CM

## 2023-04-04 RX ORDER — ZOLPIDEM TARTRATE 10 MG/1
10 TABLET ORAL
Qty: 30 TABLET | Refills: 0 | Status: SHIPPED | OUTPATIENT
Start: 2023-04-04

## 2023-04-23 DIAGNOSIS — I10 HYPERTENSION, UNSPECIFIED TYPE: ICD-10-CM

## 2023-04-23 RX ORDER — LISINOPRIL 10 MG/1
TABLET ORAL
Qty: 30 TABLET | Refills: 0 | Status: SHIPPED | OUTPATIENT
Start: 2023-04-23

## 2023-05-10 DIAGNOSIS — G47.9 SLEEP DISTURBANCE: ICD-10-CM

## 2023-05-10 RX ORDER — ZOLPIDEM TARTRATE 10 MG/1
10 TABLET ORAL
Qty: 30 TABLET | Refills: 0 | Status: SHIPPED | OUTPATIENT
Start: 2023-05-10

## 2023-05-21 DIAGNOSIS — I10 HYPERTENSION, UNSPECIFIED TYPE: ICD-10-CM

## 2023-05-21 RX ORDER — LISINOPRIL 10 MG/1
TABLET ORAL
Qty: 30 TABLET | Refills: 0 | Status: SHIPPED | OUTPATIENT
Start: 2023-05-21

## 2023-05-24 ENCOUNTER — ANESTHESIA (OUTPATIENT)
Dept: PERIOP | Facility: HOSPITAL | Age: 63
End: 2023-05-24

## 2023-05-24 ENCOUNTER — ANESTHESIA EVENT (OUTPATIENT)
Dept: PERIOP | Facility: HOSPITAL | Age: 63
End: 2023-05-24

## 2023-05-24 ENCOUNTER — HOSPITAL ENCOUNTER (OUTPATIENT)
Dept: PERIOP | Facility: HOSPITAL | Age: 63
Setting detail: OUTPATIENT SURGERY
Discharge: HOME | End: 2023-05-24
Attending: INTERNAL MEDICINE

## 2023-05-24 VITALS
DIASTOLIC BLOOD PRESSURE: 87 MMHG | BODY MASS INDEX: 49.44 KG/M2 | TEMPERATURE: 97.6 F | HEIGHT: 67 IN | HEART RATE: 67 BPM | WEIGHT: 315 LBS | SYSTOLIC BLOOD PRESSURE: 138 MMHG | RESPIRATION RATE: 20 BRPM | OXYGEN SATURATION: 96 %

## 2023-05-24 DIAGNOSIS — I44.1 MOBITZ (TYPE) I (WENCKEBACH'S) ATRIOVENTRICULAR BLOCK: ICD-10-CM

## 2023-05-24 DIAGNOSIS — Z86.010 HISTORY OF COLON POLYPS: ICD-10-CM

## 2023-05-24 RX ORDER — ALBUTEROL SULFATE 2.5 MG/3ML
2.5 SOLUTION RESPIRATORY (INHALATION) ONCE AS NEEDED
Status: DISCONTINUED | OUTPATIENT
Start: 2023-05-24 | End: 2023-05-28 | Stop reason: HOSPADM

## 2023-05-24 RX ORDER — PROPOFOL 10 MG/ML
INJECTION, EMULSION INTRAVENOUS AS NEEDED
Status: DISCONTINUED | OUTPATIENT
Start: 2023-05-24 | End: 2023-05-24

## 2023-05-24 RX ORDER — GLYCOPYRROLATE 0.2 MG/ML
INJECTION INTRAMUSCULAR; INTRAVENOUS AS NEEDED
Status: DISCONTINUED | OUTPATIENT
Start: 2023-05-24 | End: 2023-05-24

## 2023-05-24 RX ORDER — ONDANSETRON 2 MG/ML
4 INJECTION INTRAMUSCULAR; INTRAVENOUS ONCE AS NEEDED
Status: DISCONTINUED | OUTPATIENT
Start: 2023-05-24 | End: 2023-05-28 | Stop reason: HOSPADM

## 2023-05-24 RX ORDER — SODIUM CHLORIDE, SODIUM LACTATE, POTASSIUM CHLORIDE, CALCIUM CHLORIDE 600; 310; 30; 20 MG/100ML; MG/100ML; MG/100ML; MG/100ML
INJECTION, SOLUTION INTRAVENOUS CONTINUOUS PRN
Status: DISCONTINUED | OUTPATIENT
Start: 2023-05-24 | End: 2023-05-24

## 2023-05-24 RX ORDER — SODIUM CHLORIDE, SODIUM LACTATE, POTASSIUM CHLORIDE, CALCIUM CHLORIDE 600; 310; 30; 20 MG/100ML; MG/100ML; MG/100ML; MG/100ML
125 INJECTION, SOLUTION INTRAVENOUS CONTINUOUS
Status: DISCONTINUED | OUTPATIENT
Start: 2023-05-24 | End: 2023-05-28 | Stop reason: HOSPADM

## 2023-05-24 RX ORDER — LIDOCAINE HYDROCHLORIDE 10 MG/ML
INJECTION, SOLUTION EPIDURAL; INFILTRATION; INTRACAUDAL; PERINEURAL AS NEEDED
Status: DISCONTINUED | OUTPATIENT
Start: 2023-05-24 | End: 2023-05-24

## 2023-05-24 RX ORDER — PROPOFOL 10 MG/ML
INJECTION, EMULSION INTRAVENOUS CONTINUOUS PRN
Status: DISCONTINUED | OUTPATIENT
Start: 2023-05-24 | End: 2023-05-24

## 2023-05-24 RX ADMIN — PROPOFOL 50 MG: 10 INJECTION, EMULSION INTRAVENOUS at 07:27

## 2023-05-24 RX ADMIN — PROPOFOL 150 MCG/KG/MIN: 10 INJECTION, EMULSION INTRAVENOUS at 07:25

## 2023-05-24 RX ADMIN — LIDOCAINE HYDROCHLORIDE 50 MG: 10 INJECTION, SOLUTION EPIDURAL; INFILTRATION; INTRACAUDAL; PERINEURAL at 07:25

## 2023-05-24 RX ADMIN — PROPOFOL 120 MG: 10 INJECTION, EMULSION INTRAVENOUS at 07:25

## 2023-05-24 RX ADMIN — SODIUM CHLORIDE, SODIUM LACTATE, POTASSIUM CHLORIDE, AND CALCIUM CHLORIDE: .6; .31; .03; .02 INJECTION, SOLUTION INTRAVENOUS at 07:15

## 2023-05-24 RX ADMIN — GLYCOPYRROLATE 0.2 MG: 0.2 INJECTION, SOLUTION INTRAMUSCULAR; INTRAVENOUS at 07:22

## 2023-05-24 NOTE — H&P
History and Physical - [facility]  Stephanie Grace 65 y o  male MRN: [id number]                  HPI: Stephanie Grace is a 65y o  year old male who presents for history of colon polyps      REVIEW OF SYSTEMS: Per the HPI, and otherwise unremarkable  Historical Information   Past Medical History:   Diagnosis Date   • Arthritis    • 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[facility] )    • Cancer ([facility] )     of back   • COVID-19    • DVT (deep venous thrombosis) (HCC)     post TKR   • Generalized anxiety disorder    • Hypertension    • Melanoma ([facility] )    • Mobitz (type) I (Wenckebach's) atrioventricular block    • Obesity    • Primary osteoarthritis of both knees    • Sleep apnea      Past Surgical History:   Procedure Laterality Date   • ABDOMINAL SURGERY     • COLONOSCOPY     • GASTRECTOMY SLEEVE LAPAROSCOPIC  2019   • JOINT REPLACEMENT Bilateral    • KNEE ARTHROSCOPY      Therapeutic;  Last Assessed: 10/30/2015   • KNEE SURGERY Bilateral 2006, 2008    Knee replacement   • MOHS SURGERY      Mohs Micrograph[facility] S[facility] Trunk First Stage, up to 5 Specimens   • TONSILLECTOMY AND ADENOIDECTOMY     • UVULECTOMY      Last Assessed: 10/30/2015     Social History   Social History     Substance and Sexual Activity   Alcohol Use Yes    Comment: rarely; 1 x year     Social History     Substance and Sexual Activity   Drug Use No     Social History     Tobacco Use   Smoking Status Never   Smokeless Tobacco Never     Family History   Problem Relation Age of Onset   • Hypotension Mother    • Cancer Mother    • Thyroid cancer Mother    • Stroke Mother         of unknown cause   • Heart disease Father         cardiac disorder   • Other Brother         Brain tumor   • Arthritis Maternal Grandmother    • Stroke Maternal Grandmother         of unknown cause   • Heart disease Maternal Grandmother         cardiac disorder   • Stroke Maternal Grandfather         of unknown cause   • Heart disease Paternal Grandmother         cardiac disorder • Liver cancer Paternal Grandfather    • Liver cancer Other    • Heart disease Other         cardiac disorder   • Lung cancer Other    • Prostate cancer Other    • Prostate cancer Family        Meds/Allergies     (Not in a hospital admission)      Allergies   Allergen Reactions   • Penicillin V Anaphylaxis       Objective     There were no vitals taken for this visit  PHYSICAL EXAMINATION:    General Appearance:   Alert, cooperative, no distress   HEENT:  Normocephalic, atraumatic, anicteric  Neck supple, symmetrical, trachea midline  Lungs:   Equal chest rise and unlabored breathing, normal effort, no coughing  Cardiovascular:   No visualized JVD  Abdomen:   No abdominal distension  Skin:   No jaundice, rashes, or lesions  Musculoskeletal:   Normal range of motion visualized  Psych:  Normal affect and normal insight  Neuro:  Alert and appropriate  ASSESSMENT/PLAN:  This is a 65y o  year old male here for colonoscopy, and he is stable and optimized for his procedure

## 2023-05-24 NOTE — ANESTHESIA PREPROCEDURE EVALUATION
Procedure:  COLONOSCOPY    Relevant Problems   CARDIO   (+) Essential hypertension   (+) First degree AV block   (+) Mixed hyperlipidemia   (+) Mobitz (type) I (Wenckebach's) atrioventricular block      /RENAL   (+) BPH without obstruction/lower urinary tract symptoms   (+) Malignant neoplasm of prostate (HCC)      MUSCULOSKELETAL   (+) Primary osteoarthritis of both knees      NEURO/PSYCH   (+) Generalized anxiety disorder      PULMONARY   (+) Obstructive sleep apnea      Hypertension    Obesity    Cancer ([facility] ) of back   Primary osteoarthritis of both knees    Arthritis    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HCC)    Generalized anxiety disorder    Melanoma ([facility] )    DVT (deep venous thrombosis) (HCC) post TKR   Sleep apnea    Mobitz (type) I (Wenckebach's) atrioventricular block    COVID-19    Morbid obesity     Physical Exam    Airway    Mallampati score: III  TM Distance: >3 FB  Neck ROM: full     Dental       Cardiovascular  Cardiovascular exam normal    Pulmonary  Pulmonary exam normal     Other Findings        Anesthesia Plan  ASA Score- 3     Anesthesia Type- IV sedation with anesthesia with ASA Monitors  Additional Monitors:   Airway Plan:           Plan Factors-Exercise tolerance (METS): >4 METS  Chart reviewed  EKG reviewed  Imaging results reviewed  Existing labs reviewed  Patient summary reviewed  Induction- intravenous  Postoperative Plan-     Informed Consent- Anesthetic plan and risks discussed with patient  I personally reviewed this patient with the CRNA  Discussed and agreed on the Anesthesia Plan with the CRNA  Emerson Rivas

## 2023-05-24 NOTE — ANESTHESIA POSTPROCEDURE EVALUATION
Post-Op Assessment Note    CV Status:  Stable    Pain management: adequate     Mental Status:  Alert and awake   Hydration Status:  Euvolemic   PONV Controlled:  Controlled   Airway Patency:  Patent      Post Op Vitals Reviewed: Yes      Staff: CRNA         No notable events documented      BP   118/67   Temp   98 6   Pulse  68   Resp   18   SpO2   98%

## 2023-05-30 ENCOUNTER — TELEPHONE (OUTPATIENT)
Dept: GASTROENTEROLOGY | Facility: CLINIC | Age: 63
End: 2023-05-30

## 2023-06-20 DIAGNOSIS — G47.9 SLEEP DISTURBANCE: ICD-10-CM

## 2023-06-20 RX ORDER — ZOLPIDEM TARTRATE 10 MG/1
10 TABLET ORAL
Qty: 30 TABLET | Refills: 0 | Status: SHIPPED | OUTPATIENT
Start: 2023-06-20

## 2023-06-23 DIAGNOSIS — I10 HYPERTENSION, UNSPECIFIED TYPE: ICD-10-CM

## 2023-06-23 RX ORDER — LISINOPRIL 10 MG/1
TABLET ORAL
Qty: 30 TABLET | Refills: 0 | Status: SHIPPED | OUTPATIENT
Start: 2023-06-23

## 2023-07-19 DIAGNOSIS — G47.9 SLEEP DISTURBANCE: ICD-10-CM

## 2023-07-20 RX ORDER — ZOLPIDEM TARTRATE 10 MG/1
10 TABLET ORAL
Qty: 30 TABLET | Refills: 0 | Status: SHIPPED | OUTPATIENT
Start: 2023-07-20

## 2023-07-24 ENCOUNTER — TELEPHONE (OUTPATIENT)
Dept: INTERNAL MEDICINE CLINIC | Facility: CLINIC | Age: 63
End: 2023-07-24

## 2023-07-24 NOTE — TELEPHONE ENCOUNTER
----- Message from Brittney Barrios, [address] sent at 7/20/2023  4:25 PM EDT -----    ----- Message -----  From: Jaclyn Huber DO  Sent: 7/20/2023   5:54 AM EDT  To: [address]    One month refill given. Pt overdue for routine.

## 2023-07-31 DIAGNOSIS — I10 HYPERTENSION, UNSPECIFIED TYPE: ICD-10-CM

## 2023-07-31 DIAGNOSIS — F41.1 GENERALIZED ANXIETY DISORDER: ICD-10-CM

## 2023-07-31 DIAGNOSIS — G47.9 SLEEP DISTURBANCE: ICD-10-CM

## 2023-07-31 RX ORDER — TRAZODONE HYDROCHLORIDE 50 MG/1
TABLET ORAL
Qty: 30 TABLET | Refills: 3 | Status: SHIPPED | OUTPATIENT
Start: 2023-07-31

## 2023-07-31 RX ORDER — LISINOPRIL 10 MG/1
TABLET ORAL
Qty: 30 TABLET | Refills: 0 | Status: SHIPPED | OUTPATIENT
Start: 2023-07-31

## 2023-07-31 RX ORDER — ESCITALOPRAM OXALATE 20 MG/1
TABLET ORAL
Qty: 30 TABLET | Refills: 5 | Status: SHIPPED | OUTPATIENT
Start: 2023-07-31

## 2023-08-17 DIAGNOSIS — G47.9 SLEEP DISTURBANCE: ICD-10-CM

## 2023-08-17 RX ORDER — ZOLPIDEM TARTRATE 10 MG/1
10 TABLET ORAL
Qty: 30 TABLET | Refills: 0 | Status: SHIPPED | OUTPATIENT
Start: 2023-08-17

## 2023-09-15 DIAGNOSIS — I10 HYPERTENSION, UNSPECIFIED TYPE: ICD-10-CM

## 2023-09-15 RX ORDER — LISINOPRIL 10 MG/1
TABLET ORAL
Qty: 30 TABLET | Refills: 0 | Status: SHIPPED | OUTPATIENT
Start: 2023-09-15

## 2023-09-18 DIAGNOSIS — G47.9 SLEEP DISTURBANCE: ICD-10-CM

## 2023-09-19 RX ORDER — ZOLPIDEM TARTRATE 10 MG/1
10 TABLET ORAL
Qty: 30 TABLET | Refills: 0 | Status: SHIPPED | OUTPATIENT
Start: 2023-09-19

## 2023-09-25 ENCOUNTER — TELEPHONE (OUTPATIENT)
Dept: INTERNAL MEDICINE CLINIC | Facility: CLINIC | Age: 63
End: 2023-09-25

## 2023-09-25 NOTE — TELEPHONE ENCOUNTER
----- Message from Monique Manuel sent at 9/25/2023  2:05 PM EDT -----    ----- Message -----  From: Betsy Sousa DO  Sent: 9/15/2023   3:53 PM EDT  To: [address]    One month script given. Overdue for routine.

## 2023-10-03 ENCOUNTER — TELEPHONE (OUTPATIENT)
Dept: INTERNAL MEDICINE CLINIC | Facility: CLINIC | Age: 63
End: 2023-10-03

## 2023-10-04 ENCOUNTER — TELEMEDICINE (OUTPATIENT)
Dept: INTERNAL MEDICINE CLINIC | Facility: CLINIC | Age: 63
End: 2023-10-04
Payer: COMMERCIAL

## 2023-10-04 DIAGNOSIS — U07.1 LAB TEST POSITIVE FOR DETECTION OF COVID-19 VIRUS: Primary | ICD-10-CM

## 2023-10-04 PROCEDURE — 99213 OFFICE O/P EST LOW 20 MIN: CPT | Performed by: INTERNAL MEDICINE

## 2023-10-04 RX ORDER — NIRMATRELVIR AND RITONAVIR 300-100 MG
3 KIT ORAL 2 TIMES DAILY
Qty: 30 TABLET | Refills: 0 | Status: SHIPPED | OUTPATIENT
Start: 2023-10-04 | End: 2023-10-09

## 2023-10-04 NOTE — PROGRESS NOTES
COVID-19 Outpatient Progress Note    Assessment/Plan:    Problem List Items Addressed This Visit    None  Visit Diagnoses     Lab test positive for detection of COVID-19 virus    -  Primary    Relevant Medications    nirmatrelvir & ritonavir (Paxlovid, 300/100,) tablet therapy pack         Disposition:     Patient with asymptomatic/mild COVID-19: They were recommended to isolate for at least 5 days (day 0 is the day symptoms appeared or the date the specimen was collected for the positive test for people who are asymptomatic). If they are asymptomatic or symptoms are improving with no fevers in the past 24 hours, isolation may be ended followed by 5 days of wearing a high quality mask when around others to minimize risk of infecting others. They should wear a mask through day 10 and a test-based strategy may be used to remove a mask sooner. Patient with moderate or severe COVID-19. They should isolate from others through at least day 10. Isolation can be ended if symptoms are improving and they are fever free for the past 24 hours. If they still have fever or other symptoms have not improved, continue to isolate until they improve. Regardless of when you isolation is ended, avoid being around people who are more likely to get very sick from COVID-19 until at least day 11. Patient's with severe COVID-19 may need to isolate up to 20 days from symptom onset. Patient is moderately or severely immunocompromised. They should isolate through at least day 20. Use of serial testing and consultation with an infectious disease specialist is recommended in these patients prior to ending isolation. Discussed symptom directed medication options with patient. Discussed vitamin D, vitamin C, and/or zinc supplementation with patient. A/P: Day # 3 since onset of COVID s/s. Covid test at home was positive. Doing ok and notes fatigue, nasal congestion, headache, and slight cough. NO SOB.  Meets criteria for high risk and agreeable to paxlovid. GFR above 60 and no meds that need adjustment. . Continue isolation/quarantine, increase fluids, PRN motrin/tylenol, and breathing exercises. RTC one day  for f/u. Patient meets criteria for Paxlovid and they have been counseled appropriately regarding risks, benefits, side effects, and alternative treatment options. After discussion, patient agrees to treatment. Possible side effects of Paxlovid? Possible side effects of Paxlovid are:  - Liver Problems. Notify us right away if you start to experience loss of appetite, yellowing of your skin and the whites of eyes (jaundice), dark-colored urine, pale colored stools and itchy skin, stomach area (abdominal) pain. - Resistance to HIV Medicines. If you have untreated HIV infection, Paxlovid may lead to some HIV medicines not working as well in the future. - Other possible side effects include: altered sense of taste, diarrhea, high blood pressure, or muscle aches. I have spent a total time of 25 minutes on the day of the encounter for this patient including discussing diagnostic results, discussing prognosis, risks and benefits of treatment options, instructions for management, patient and family education, importance of treatment compliance, risk factor reductions, impressions, counseling/coordination of care, documenting in the medical record, reviewing/ordering tests, medicine, procedures and obtaining or reviewing history.        Encounter provider: Miguel Cruz DO     Provider located at: [address] [place] [id number]     Recent Visits  Date Type Provider Dept   10/03/23 Telephone Evelia Cain MA [facility] [address] recent visits within past 7 days and meeting all other requirements  Today's Visits  Date Type Provider Dept   10/04/23 Telemedicine Miguel Cruz DO [facility] [address] today's visits and meeting all other requirements  Future Appointments  No visits were found meeting these conditions. Showing future appointments within next 150 days and meeting all other requirements     This virtual check-in was done via [facility] and patient was informed that this is a secure, HIPAA-compliant platform. He agrees to proceed. Patient agrees to participate in a virtual check in via telephone or video visit instead of presenting to the office to address urgent/immediate medical needs. Patient is aware this is a billable service. He acknowledged consent and understanding of privacy and security of the video platform. The patient has agreed to participate and understands they can discontinue the visit at any time. After connecting through [facility], the patient was identified by name and date of birth. Eve Fuentes was informed that this was a telemedicine visit and that the exam was being conducted confidentially over secure lines. My office door was closed. No one else was in the room. Eve Fuentes acknowledged consent and understanding of privacy and security of the telemedicine visit. I informed the patient that I have reviewed his record in Epic and presented the opportunity for him to ask any questions regarding the visit today. The patient agreed to participate. Verification of patient location:  Patient is located in the following state in which I hold an active license: [place]    Subjective:   Eve Fuentes is a 65 y.o. male who has been screened for COVID-19. Symptom change since last report: unchanged. Patient's symptoms include fever, nasal congestion, cough, myalgias and headache. Patient denies chills, fatigue, anosmia, loss of taste, shortness of breath, chest tightness, abdominal pain, nausea, vomiting and diarrhea. - Date of symptom onset: 9/30/2023  - Date of positive COVID-19 test: 10/3/2023. Type of test: Home antigen.  Patient with typical symptoms of COVID-19 and they attest that they were positive on home rapid antigen testing. Image of positive result is not able to be uploaded into their chart. COVID-19 vaccination status: Fully vaccinated (primary series)    Acacia Lentz has been staying home and has isolated themselves in his home. He is taking care to not share personal items and is cleaning all surfaces that are touched often, like counters, tabletops, and doorknobs using household cleaning sprays or wipes. He is wearing a mask when he leaves his room. Lab Results   Component Value Date    SARSCOV2 Not Detected 05/20/2022    SARSCOV2  05/20/2022     INTERPRETATION:  SARS-CoV-2 NOT detected. CLINICAL SIGNIFICANCE AND ASSAY LIMITATIONS:  SARS-CoV-2 detection may be   affected by sample collection methods, patient factors (e.g., presence of   symptoms), and/or stage of infection. Nasopharyngeal specimens are preferred,   but other upper respiratory specimen types are acceptable. The performance   characteristics of other specimen types have been evaluated, but not fully   established. A "Positive" result does not rule out bacterial infection or   co-infection with other viruses. A "Not Detected" result does not preclude   SARS-CoV-2 infection and should not be used as the sole basis for treatment or   patient management.  This assay does not detect other common respiratory   pathogens, including common human coronavirus strains (229E, HKU1, NL63, OC43). METHOD (SARS-CoV-2 only):  Presence of SARS-CoV-2 nucleic acid was determined   using the PCR-based loni SARS-CoV-2 assay ([employer]) for use on   the loni [employer]/8800 Systems. This method only provides a SARS-CoV-2 result. The   normal reference range is "Not detected". METHOD (SARS-CoV-2 and Influenza A/B):  Presence of SARS-CoV-2, Influenza A, and   Influenza B nucleic acid was determined using the PCR-based loni SARS-CoV-2   and Influenza A/B assay ([employer]) for use on the loni 6800/8800   Systems. This method provides concurrent SARS-CoV-2, Influenza A, and Influenza   B results; please refer to separate influenza comment for additional   interpretive details. The normal reference range is "Not detected". ADDITIONAL INFORMATION:  SARS-CoV-2 Only  For Healthcare Providers: BagFit.fi  For Patients: BagFit.fi    SARS-CoV-2 and Influenza A/B  For Healthcare Providers: WebCeliaating.fr  For Patients: WebCheating.fr    This test has received Emergency Use Authorization (EUA) by the FDA. This test   was performed by the [facility] at [address] [place] [address] at   [address], [place], [address] SARSCOV2 Detected (A) 06/25/2020    SARSCORONAVI Detected (A) 06/25/2020       Review of Systems   Constitutional: Positive for activity change and fever. Negative for chills, diaphoresis and fatigue. HENT: Positive for congestion. Negative for ear discharge, ear pain, sinus pressure and sinus pain. No loss of taste/smell. Respiratory: Positive for cough. Negative for chest tightness, shortness of breath and wheezing. Cardiovascular: Negative for chest pain, palpitations and leg swelling. Gastrointestinal: Negative for abdominal pain, constipation, diarrhea, nausea and vomiting. Genitourinary: Negative for difficulty urinating, dysuria and frequency. Musculoskeletal: Positive for myalgias. Negative for arthralgias and gait problem. Neurological: Positive for headaches. Negative for light-headedness. Psychiatric/Behavioral: Negative for confusion. The patient is not nervous/anxious.       Current Outpatient Medications on File Prior to Visit   Medication Sig   • aspirin (ECOTRIN LOW STRENGTH) 81 mg EC tablet Take 81 mg by mouth daily   • Calcium Carbonate-Vitamin D 600-400 MG-UNIT per tablet Take 1 tablet by mouth   • escitalopram (LEXAPRO) 20 mg tablet take 1 tablet by mouth once daily   • ketoconazole (NIZORAL) 2 % shampoo     • lisinopril (ZESTRIL) 10 mg tablet take 1 tablet by mouth once daily   • Multiple Vitamins-Minerals (multivitamin with minerals) tablet Take 1 tablet by mouth daily   • traZODone (DESYREL) 50 mg tablet take 1 tablet by mouth once daily at bedtime   • zolpidem (AMBIEN) 10 mg tablet Take 1 tablet (10 mg total) by mouth daily at bedtime as needed for sleep       Objective: There were no vitals taken for this visit. Physical Exam  Constitutional:       Appearance: Normal appearance. HENT:      Head: Normocephalic and atraumatic. Mouth/Throat:      Mouth: Mucous membranes are moist.   Eyes:      Extraocular Movements: Extraocular movements intact. Conjunctiva/sclera: Conjunctivae normal.      Pupils: Pupils are equal, round, and reactive to light. Pulmonary:      Effort: Pulmonary effort is normal. No respiratory distress. Neurological:      General: No focal deficit present. Mental Status: He is alert and oriented to person, place, and time. Mental status is at baseline. Psychiatric:         Mood and Affect: Mood normal.         Behavior: Behavior normal.         Thought Content:  Thought content normal.         Judgment: Judgment normal.       Huber Beth,

## 2023-10-04 NOTE — PATIENT INSTRUCTIONS
COVID-19 (Coronavirus Disease 2019)   WHAT YOU NEED TO KNOW:   COVID-19 is the disease caused by a coronavirus first discovered in December 2019. The virus can be spread starting 2 to 3 days before symptoms begin. The virus has changed into several new forms (called variants) since it was discovered. A variant may be more easily spread or cause more severe illness than the original form. DISCHARGE INSTRUCTIONS:   Call your local emergency number (911 in the [address]) if:   You have trouble breathing or shortness of breath at rest.    You have chest pain or pressure that lasts longer than 5 minutes. You become confused or hard to wake. Return to the emergency department if:   The skin on your face, fingers, or toes look blue or darker than usual.      Call your doctor if:   You have a fever. You have questions or concerns about your condition or care. Medicines: You may need any of the following:  Decongestants  help reduce nasal congestion and help you breathe more easily. If you take decongestant pills, they may make you feel restless or cause problems with your sleep. Do not use decongestant sprays for more than a few days. Cough suppressants  help reduce coughing. Ask your healthcare provider which type of cough medicine is best for you. Acetaminophen  decreases pain and fever. It is available without a doctor's order. Ask how much to take and how often to take it. Follow directions. Read the labels of all other medicines you are using to see if they also contain acetaminophen, or ask your doctor or pharmacist. Acetaminophen can cause liver damage if not taken correctly. NSAIDs , such as ibuprofen, help decrease swelling, pain, and fever. This medicine is available with or without a doctor's order. NSAIDs can cause stomach bleeding or kidney problems in certain people. If you take blood thinner medicine, always ask your healthcare provider if NSAIDs are safe for you.  Always read the medicine label and follow directions. Blood thinners  help prevent blood clots. Clots can cause strokes, heart attacks, and death. Many types of blood thinners are available. Your healthcare provider will give you specific instructions for the type you are given. The following are general safety guidelines to follow while you are taking a blood thinner:    Watch for bleeding and bruising. Watch for bleeding from your gums or nose. Watch for blood in your urine and bowel movements. Use a soft washcloth on your skin, and a soft toothbrush to brush your teeth. This can keep your skin and gums from bleeding. If you shave, use an electric shaver. Do not play contact sports. Tell your dentist and other healthcare providers that you take a blood thinner. Wear a bracelet or necklace that says you take this medicine. Do not start or stop any other medicines or supplements unless your healthcare provider tells you to. Many medicines and supplements cannot be used with blood thinners. Take your blood thinner exactly as prescribed by your healthcare provider. Do not skip does or take less than prescribed. Tell your provider right away if you forget to take your blood thinner, or if you take too much. Take your medicine as directed. Contact your healthcare provider if you think your medicine is not helping or if you have side effects. Tell your provider if you are allergic to any medicine. Keep a list of the medicines, vitamins, and herbs you take. Include the amounts, and when and why you take them. Bring the list or the pill bottles to follow-up visits. Carry your medicine list with you in case of an emergency. What you need to know about COVID-19 vaccines:  Healthcare providers recommend vaccination, even if you already had COVID-19. Get a COVID-19 vaccine as directed. Vaccination is recommended for everyone 6 months or older. COVID-19 vaccines are given as a shot in 1 to 3 doses as a primary series.  This depends on the vaccine brand and the age of the person who receives it. A booster dose is recommended for everyone 5 years or older after the primary series is complete. A second booster  is recommended for all adults 48 or older and for immunocompromised adolescents. The second booster is also recommended for anyone who got the 1-dose brand of vaccine for the first dose and a booster. Your provider can give you more information on boosters and help you schedule all needed doses. Continue to protect yourself and others. You can become infected even after you get the vaccine. You may also be able to pass the virus to others without knowing you are infected. After you get the vaccine, check local, national, and international travel rules. You may need to be tested before you travel. Some countries require proof of a negative test before you travel. You may also need to quarantine after you return. Medicine may be given to prevent infection. The medicine can be given if you are at high risk for infection and cannot get the vaccine. It can also be given if your immune system does not respond well to the vaccine. How the 2019 coronavirus spreads:  Close personal contact with an infected person increases your risk for infection. This means being within 6 feet (2 meters) of the person for at least 15 minutes over 24 hours. The virus travels in droplets that form when a person talks, sings, coughs, or sneezes. The droplets can also float in the air for minutes or hours. Infection happens when you breathe in the droplets or get them in your eyes or nose. Person-to-person contact can spread the virus. For example, a person with the virus on his or her hands can spread it by shaking hands with someone. The virus can stay on objects and surfaces for hours to days. You may become infected by touching the object or surface and then touching your eyes or mouth.     Help lower your risk for COVID-19 during an active outbreak:   Stay home if you are sick or think you may have COVID-19. It is important to stay home if you are waiting for a testing appointment or for test results. Wash your hands often throughout the day. Use soap and water. Rub your soapy hands together, lacing your fingers, for at least 20 seconds. Rinse with warm, running water. Dry your hands with a clean towel or paper towel. Use hand  that contains alcohol if soap and water are not available. Teach children how to wash their hands and use hand . Cover sneezes and coughs. Turn your face away and cover your mouth and nose with a tissue. Throw the tissue away. Use the bend of your arm if a tissue is not available. Then wash your hands well with soap and water or use hand . Teach children how to cover a cough or sneeze. Wear a face covering (mask) when needed. Use a cloth covering with at least 2 layers. You can also create layers by putting a cloth covering over a disposable non-medical mask. Cover your mouth and your nose. Try to keep space between you and others when you are out of the house. Avoid crowds as much as possible. Wear a face covering when you must be around a large group and cannot keep space between you and others. Clean and disinfect high-touch surfaces and objects often. Use disinfecting wipes, or make a solution of 4 teaspoons of bleach in 1 quart (4 cups) of water. Ask about other vaccines you may need. Get the influenza (flu) vaccine as soon as recommended each year, usually starting in September or October. Get the pneumonia vaccine if recommended. Your healthcare provider can tell you if you should also get other vaccines, and when to get them. Follow up with your doctor as directed:  Write down your questions so you remember to ask them during your visits.   For more information:   Centers for Disease Control and Prevention  [address]  [place] [address]  Phone: 6- 434 - 607-2953  Web Address: [employer]    © Copyright Merative 2023 Information is for End User's use only and may not be sold, redistributed or otherwise used for commercial purposes. The above information is an  only. It is not intended as medical advice for individual conditions or treatments. Talk to your doctor, nurse or pharmacist before following any medical regimen to see if it is safe and effective for you.

## 2023-10-30 DIAGNOSIS — G47.9 SLEEP DISTURBANCE: ICD-10-CM

## 2023-10-30 RX ORDER — ZOLPIDEM TARTRATE 10 MG/1
10 TABLET ORAL
Qty: 30 TABLET | Refills: 0 | Status: SHIPPED | OUTPATIENT
Start: 2023-10-30

## 2023-11-07 ENCOUNTER — RA CDI HCC (OUTPATIENT)
Dept: OTHER | Facility: HOSPITAL | Age: 63
End: 2023-11-07

## 2023-11-07 PROBLEM — E66.01 MORBID (SEVERE) OBESITY DUE TO EXCESS CALORIES (HCC): Status: ACTIVE | Noted: 2023-11-07

## 2023-11-07 NOTE — PROGRESS NOTES
[address] [facility] coding opportunities        DO Bhanu Ballard  I have reviewed the recommendation(s) and accept the change(s) suggested.     Chart Reviewed number of suggestions sent to Provider: 1   E66.01    Patients Insurance        Commercial Insurance: [employer]

## 2023-11-28 DIAGNOSIS — I10 HYPERTENSION, UNSPECIFIED TYPE: ICD-10-CM

## 2023-11-28 DIAGNOSIS — G47.9 SLEEP DISTURBANCE: ICD-10-CM

## 2023-11-28 RX ORDER — LISINOPRIL 10 MG/1
10 TABLET ORAL DAILY
Qty: 30 TABLET | Refills: 0 | Status: SHIPPED | OUTPATIENT
Start: 2023-11-28

## 2023-11-28 RX ORDER — ZOLPIDEM TARTRATE 10 MG/1
10 TABLET ORAL
Qty: 30 TABLET | Refills: 0 | Status: SHIPPED | OUTPATIENT
Start: 2023-11-28

## 2023-12-14 ENCOUNTER — OFFICE VISIT (OUTPATIENT)
Dept: INTERNAL MEDICINE CLINIC | Facility: CLINIC | Age: 63
End: 2023-12-14
Payer: COMMERCIAL

## 2023-12-14 ENCOUNTER — APPOINTMENT (OUTPATIENT)
Dept: LAB | Facility: CLINIC | Age: 63
End: 2023-12-14
Payer: COMMERCIAL

## 2023-12-14 VITALS
OXYGEN SATURATION: 96 % | HEART RATE: 67 BPM | BODY MASS INDEX: 49.44 KG/M2 | DIASTOLIC BLOOD PRESSURE: 86 MMHG | WEIGHT: 315 LBS | HEIGHT: 67 IN | TEMPERATURE: 98.1 F | SYSTOLIC BLOOD PRESSURE: 128 MMHG

## 2023-12-14 DIAGNOSIS — E78.2 MIXED HYPERLIPIDEMIA: ICD-10-CM

## 2023-12-14 DIAGNOSIS — Z13.220 SCREENING FOR LIPID DISORDERS: ICD-10-CM

## 2023-12-14 DIAGNOSIS — Z00.00 WELL ADULT EXAM: ICD-10-CM

## 2023-12-14 DIAGNOSIS — R73.03 PREDIABETES: ICD-10-CM

## 2023-12-14 DIAGNOSIS — Z13.0 SCREENING FOR DEFICIENCY ANEMIA: ICD-10-CM

## 2023-12-14 DIAGNOSIS — N40.0 BPH WITHOUT OBSTRUCTION/LOWER URINARY TRACT SYMPTOMS: ICD-10-CM

## 2023-12-14 DIAGNOSIS — Z23 ENCOUNTER FOR VACCINATION: ICD-10-CM

## 2023-12-14 DIAGNOSIS — F41.1 GENERALIZED ANXIETY DISORDER: ICD-10-CM

## 2023-12-14 DIAGNOSIS — Z23 ENCOUNTER FOR IMMUNIZATION: ICD-10-CM

## 2023-12-14 DIAGNOSIS — I10 ESSENTIAL HYPERTENSION: ICD-10-CM

## 2023-12-14 DIAGNOSIS — G47.33 OBSTRUCTIVE SLEEP APNEA: ICD-10-CM

## 2023-12-14 DIAGNOSIS — Z98.84 S/P GASTRIC BYPASS: ICD-10-CM

## 2023-12-14 DIAGNOSIS — Z13.1 SCREENING FOR DIABETES MELLITUS (DM): ICD-10-CM

## 2023-12-14 DIAGNOSIS — C61 MALIGNANT NEOPLASM OF PROSTATE (HCC): ICD-10-CM

## 2023-12-14 DIAGNOSIS — E55.9 VITAMIN D DEFICIENCY: ICD-10-CM

## 2023-12-14 DIAGNOSIS — I10 ESSENTIAL HYPERTENSION: Primary | ICD-10-CM

## 2023-12-14 DIAGNOSIS — Z13.29 SCREENING FOR THYROID DISORDER: ICD-10-CM

## 2023-12-14 DIAGNOSIS — N52.39 POST-SURGICAL ERECTILE DYSFUNCTION: ICD-10-CM

## 2023-12-14 DIAGNOSIS — C43.9 MALIGNANT MELANOMA OF SKIN (HCC): ICD-10-CM

## 2023-12-14 DIAGNOSIS — E66.01 MORBID (SEVERE) OBESITY DUE TO EXCESS CALORIES (HCC): ICD-10-CM

## 2023-12-14 LAB
25(OH)D3 SERPL-MCNC: 46.6 NG/ML (ref 30–100)
ALBUMIN SERPL BCP-MCNC: 4 G/DL (ref 3.5–5)
ALP SERPL-CCNC: 99 U/L (ref 34–104)
ALT SERPL W P-5'-P-CCNC: 19 U/L (ref 7–52)
ANION GAP SERPL CALCULATED.3IONS-SCNC: 8 MMOL/L
AST SERPL W P-5'-P-CCNC: 17 U/L (ref 13–39)
BASOPHILS # BLD AUTO: 0.07 THOUSANDS/ÂΜL (ref 0–0.1)
BASOPHILS NFR BLD AUTO: 1 % (ref 0–1)
BILIRUB SERPL-MCNC: 0.77 MG/DL (ref 0.2–1)
BUN SERPL-MCNC: 20 MG/DL (ref 5–25)
CALCIUM SERPL-MCNC: 9.1 MG/DL (ref 8.4–10.2)
CHLORIDE SERPL-SCNC: 105 MMOL/L (ref 96–108)
CHOLEST SERPL-MCNC: 169 MG/DL
CO2 SERPL-SCNC: 30 MMOL/L (ref 21–32)
CREAT SERPL-MCNC: 0.79 MG/DL (ref 0.6–1.3)
CREAT UR-MCNC: 107 MG/DL
EOSINOPHIL # BLD AUTO: 0.13 THOUSAND/ÂΜL (ref 0–0.61)
EOSINOPHIL NFR BLD AUTO: 2 % (ref 0–6)
ERYTHROCYTE [DISTWIDTH] IN BLOOD BY AUTOMATED COUNT: 13.6 % (ref 11.6–15.1)
EST. AVERAGE GLUCOSE BLD GHB EST-MCNC: 126 MG/DL
FERRITIN SERPL-MCNC: 34 NG/ML (ref 24–336)
FOLATE SERPL-MCNC: 8.6 NG/ML
GFR SERPL CREATININE-BSD FRML MDRD: 96 ML/MIN/1.73SQ M
GLUCOSE P FAST SERPL-MCNC: 105 MG/DL (ref 65–99)
HBA1C MFR BLD: 6 %
HCT VFR BLD AUTO: 50.7 % (ref 36.5–49.3)
HDLC SERPL-MCNC: 47 MG/DL
HGB BLD-MCNC: 16.6 G/DL (ref 12–17)
IMM GRANULOCYTES # BLD AUTO: 0.02 THOUSAND/UL (ref 0–0.2)
IMM GRANULOCYTES NFR BLD AUTO: 0 % (ref 0–2)
IRON SATN MFR SERPL: 37 % (ref 15–50)
IRON SERPL-MCNC: 114 UG/DL (ref 50–212)
LDLC SERPL CALC-MCNC: 108 MG/DL (ref 0–100)
LYMPHOCYTES # BLD AUTO: 1.41 THOUSANDS/ÂΜL (ref 0.6–4.47)
LYMPHOCYTES NFR BLD AUTO: 24 % (ref 14–44)
MCH RBC QN AUTO: 28.4 PG (ref 26.8–34.3)
MCHC RBC AUTO-ENTMCNC: 32.7 G/DL (ref 31.4–37.4)
MCV RBC AUTO: 87 FL (ref 82–98)
MICROALBUMIN UR-MCNC: <7 MG/L
MICROALBUMIN/CREAT 24H UR: <7 MG/G CREATININE (ref 0–30)
MONOCYTES # BLD AUTO: 0.56 THOUSAND/ÂΜL (ref 0.17–1.22)
MONOCYTES NFR BLD AUTO: 9 % (ref 4–12)
NEUTROPHILS # BLD AUTO: 3.74 THOUSANDS/ÂΜL (ref 1.85–7.62)
NEUTS SEG NFR BLD AUTO: 64 % (ref 43–75)
NRBC BLD AUTO-RTO: 0 /100 WBCS
PLATELET # BLD AUTO: 201 THOUSANDS/UL (ref 149–390)
PMV BLD AUTO: 10 FL (ref 8.9–12.7)
POTASSIUM SERPL-SCNC: 4.7 MMOL/L (ref 3.5–5.3)
PROT SERPL-MCNC: 6.3 G/DL (ref 6.4–8.4)
RBC # BLD AUTO: 5.84 MILLION/UL (ref 3.88–5.62)
SODIUM SERPL-SCNC: 143 MMOL/L (ref 135–147)
TIBC SERPL-MCNC: 312 UG/DL (ref 250–450)
TRIGL SERPL-MCNC: 69 MG/DL
TSH SERPL DL<=0.05 MIU/L-ACNC: 1.41 UIU/ML (ref 0.45–4.5)
UIBC SERPL-MCNC: 198 UG/DL (ref 155–355)
VIT B12 SERPL-MCNC: 389 PG/ML (ref 180–914)
WBC # BLD AUTO: 5.93 THOUSAND/UL (ref 4.31–10.16)

## 2023-12-14 PROCEDURE — 82306 VITAMIN D 25 HYDROXY: CPT

## 2023-12-14 PROCEDURE — 84207 ASSAY OF VITAMIN B-6: CPT

## 2023-12-14 PROCEDURE — 90471 IMMUNIZATION ADMIN: CPT

## 2023-12-14 PROCEDURE — 83036 HEMOGLOBIN GLYCOSYLATED A1C: CPT

## 2023-12-14 PROCEDURE — 82728 ASSAY OF FERRITIN: CPT

## 2023-12-14 PROCEDURE — 80061 LIPID PANEL: CPT

## 2023-12-14 PROCEDURE — 83540 ASSAY OF IRON: CPT

## 2023-12-14 PROCEDURE — 82746 ASSAY OF FOLIC ACID SERUM: CPT

## 2023-12-14 PROCEDURE — 84443 ASSAY THYROID STIM HORMONE: CPT

## 2023-12-14 PROCEDURE — 36415 COLL VENOUS BLD VENIPUNCTURE: CPT

## 2023-12-14 PROCEDURE — 80053 COMPREHEN METABOLIC PANEL: CPT

## 2023-12-14 PROCEDURE — 90677 PCV20 VACCINE IM: CPT

## 2023-12-14 PROCEDURE — 85025 COMPLETE CBC W/AUTO DIFF WBC: CPT

## 2023-12-14 PROCEDURE — 84590 ASSAY OF VITAMIN A: CPT

## 2023-12-14 PROCEDURE — 83550 IRON BINDING TEST: CPT

## 2023-12-14 PROCEDURE — 84425 ASSAY OF VITAMIN B-1: CPT

## 2023-12-14 PROCEDURE — 90686 IIV4 VACC NO PRSV 0.5 ML IM: CPT

## 2023-12-14 PROCEDURE — 99214 OFFICE O/P EST MOD 30 MIN: CPT | Performed by: INTERNAL MEDICINE

## 2023-12-14 PROCEDURE — 82607 VITAMIN B-12: CPT

## 2023-12-14 PROCEDURE — 90472 IMMUNIZATION ADMIN EACH ADD: CPT

## 2023-12-14 RX ORDER — SILDENAFIL 100 MG/1
100 TABLET, FILM COATED ORAL DAILY PRN
Qty: 6 TABLET | Refills: 0 | Status: SHIPPED | OUTPATIENT
Start: 2023-12-14

## 2023-12-14 NOTE — PATIENT INSTRUCTIONS
Chronic Hypertension   AMBULATORY CARE:   Hypertension is considered chronic  when it continues for 3 months or longer. Hypertension that continues causes your heart to work much harder than normal, which may lead to heart damage. Even if you have hypertension for years, lifestyle changes, medicines, or both may help lower your blood pressure. Call your local emergency number [phone] in the [address]) or have someone call if:   You have chest pain. You have any of the following signs of a heart attack:      Squeezing, pressure, or pain in your chest    You may  also have any of the following:     Discomfort or pain in your back, neck, jaw, stomach, or arm    Shortness of breath    Nausea or vomiting    Lightheadedness or a sudden cold sweat    You become confused or have difficulty speaking. You suddenly feel lightheaded or have trouble breathing. Seek care immediately if:   You have a severe headache or vision loss. You have weakness in an arm or leg. Call your doctor or cardiologist if:   You feel faint, dizzy, confused, or drowsy. You have been taking your blood pressure medicine but your pressure is higher than your provider says it should be. You have questions or concerns about your condition or care. Treatment for chronic hypertension  may include medicine to lower your blood pressure and cholesterol levels. A low cholesterol level helps prevent heart disease and makes it easier to control your blood pressure. Heart disease can make your blood pressure harder to control. You may also need to make lifestyle changes. What you need to know about the stages of hypertension:  Your healthcare provider will give you a blood pressure goal based on your age, health, and risk for cardiovascular disease. The following are general guidelines on the stages of hypertension:  Normal blood pressure is 119/79 or lower .  Your provider may only check your blood pressure each year if it stays at a normal level.    Elevated blood pressure is 120/79 to 129/79 . This is sometimes called prehypertension. Your provider may suggest lifestyle changes to help lower your blood pressure to a normal level. He or she may then check it again in 3 to 6 months. Stage 1 hypertension is 130/80  to 139/89 . Your provider may recommend lifestyle changes, medication, and checks every 3 to 6 months until your blood pressure is controlled. Stage 2 hypertension is 140/90 or higher . Your provider will recommend lifestyle changes and have you take 2 kinds of hypertension medicines. You will also need to have your blood pressure checked monthly until it is controlled. Manage chronic hypertension:   Check your blood pressure at home. Do not smoke, have caffeine, or exercise for at least 30 minutes before you check your blood pressure. Sit and rest for 5 minutes before you check your blood pressure. Extend your arm and support it on a flat surface. Your arm should be at the same level as your heart. Follow the directions that came with your blood pressure monitor. Check your blood pressure 2 times, 1 minute apart, before you take your medicine in the morning. Also check your blood pressure before your evening meal. Keep a record of your readings and bring it to your follow-up visits. Your healthcare provider may use the readings to make changes to your treatment plan. Manage any other health conditions you have. Health conditions such as diabetes can increase your risk for hypertension. Follow your provider's instructions and take all your medicines as directed. Talk to your provider about any new health conditions you have recently developed. Ask about all medicines. Certain medicines can increase your blood pressure. Examples include oral birth control pills, decongestants, herbal supplements, and NSAIDs, such as ibuprofen. Your provider can tell you which medicines are safe for you to take.  This includes prescription and over-the-counter medicines. Lifestyle changes you can make to lower your blood pressure: Your provider may want you to make more lifestyle changes if you are having trouble controlling your blood pressure. This may feel difficult over time, especially if you think you are making good changes but your pressure is still high. It might help to focus on one new change at a time. For example, try to add 1 more day of exercise, or exercise for an extra 10 minutes on 2 days. Small changes can make a big difference. Your healthcare provider can also refer you to specialists such as a dietitian who can help you make small changes. Your family members may be included in helping you learn to create lifestyle changes, such as the following:     Limit sodium (salt) as directed. Too much sodium can affect your fluid balance. Check labels to find low-sodium or no-salt-added foods. Some low-sodium foods use potassium salts for flavor. Too much potassium can also cause health problems. Your provider will tell you how much sodium and potassium are safe for you to have in a day. He or she may recommend that you limit sodium to 2,300 mg a day. Follow the meal plan recommended by your provider. A dietitian or your provider can give you more information on low-sodium plans or the DASH (Dietary Approaches to Stop Hypertension) eating plan. The DASH plan is low in sodium, processed sugar, unhealthy fats, and total fat. It is high in potassium, calcium, and fiber. These can be found in vegetables, fruit, and whole-grain foods. Be physically active throughout the day. Physical activity, such as exercise, can help control your blood pressure and your weight. Be physically active for at least 30 minutes per day, on most days of the week. Include aerobic activity, such as walking or riding a bicycle. Also include strength training at least 2 times each week.  Your provider can help you create a physical activity plan. Decrease stress. This may help lower your blood pressure. Learn ways to relax, such as deep breathing or listening to music. Limit alcohol as directed. Alcohol can increase your blood pressure. A drink of alcohol is 12 ounces of beer, 5 ounces of wine, or 1½ ounces of liquor. Your provider can help you set daily and weekly drink limits. He or she may recommend no alcohol if your blood pressure stays higher than goal even with medicine or other measures. Ask your provider for information if you need help to quit. Do not smoke. Nicotine and other chemicals in cigarettes and cigars can increase your blood pressure and also cause lung damage. Ask your provider for information if you currently smoke and need help to quit. E-cigarettes or smokeless tobacco still contain nicotine. Talk to your provider before you use these products. Follow up with your doctor or cardiologist as directed: You will need to return to have your blood pressure checked and to have other lab tests done. Write down your questions so you remember to ask them during your visits. © Copyright Carlylea Gosselin 2023 Information is for End User's use only and may not be sold, redistributed or otherwise used for commercial purposes. The above information is an  only. It is not intended as medical advice for individual conditions or treatments. Talk to your doctor, nurse or pharmacist before following any medical regimen to see if it is safe and effective for you.

## 2023-12-14 NOTE — PROGRESS NOTES
Depression Screening and Follow-up Plan: Patient was screened for depression during today's encounter. They screened negative with a PHQ-2 score of 0. Assessment/Plan:  Problem List Items Addressed This Visit        Respiratory    Obstructive sleep apnea       Cardiovascular and Mediastinum    Essential hypertension - Primary    Relevant Orders    Albumin / creatinine urine ratio       Musculoskeletal and Integument    Malignant melanoma of skin (HCC)       Genitourinary    Malignant neoplasm of prostate (HCC)    Relevant Medications    sildenafil (VIAGRA) 100 mg tablet    BPH without obstruction/lower urinary tract symptoms       Other    Vitamin D deficiency    S/P gastric bypass    Prediabetes    Morbid (severe) obesity due to excess calories ([address])    Mixed hyperlipidemia    Generalized anxiety disorder   Other Visit Diagnoses     Encounter for immunization        Relevant Orders    Pneumococcal Conjugate Vaccine 20-valent (PCV20)    influenza vaccine, quadrivalent, 0.5 mL, preservative-free, for adult and pediatric patients 6 mos+ (AFLURIA, FLUARIX, FLULAVAL, FLUZONE)    Encounter for vaccination        Screening for thyroid disorder        Post-surgical erectile dysfunction        Relevant Medications    sildenafil (VIAGRA) 100 mg tablet           Diagnoses and all orders for this visit:    Essential hypertension  -     Cancel: CBC and differential; Future  -     Cancel: Comprehensive metabolic panel; Future  -     Albumin / creatinine urine ratio  -     Cancel: TSH, 3rd generation; Future    Encounter for immunization  -     Pneumococcal Conjugate Vaccine 20-valent (PCV20)  -     influenza vaccine, quadrivalent, 0.5 mL, preservative-free, for adult and pediatric patients 6 mos+ (AFLURIA, FLUARIX, FLULAVAL, FLUZONE)    BPH without obstruction/lower urinary tract symptoms    Malignant neoplasm of prostate (HCC)  -     sildenafil (VIAGRA) 100 mg tablet;  Take 1 tablet (100 mg total) by mouth daily as needed for erectile dysfunction    Malignant melanoma of skin (HCC)    Mixed hyperlipidemia  -     Cancel: Comprehensive metabolic panel; Future  -     Cancel: Lipid Panel with Direct LDL reflex; Future  -     Cancel: TSH, 3rd generation; Future    Generalized anxiety disorder    Morbid (severe) obesity due to excess calories (HCC)    S/P gastric bypass  -     Cancel: Vitamin A; Future  -     Cancel: Vitamin B1, whole blood; Future  -     Cancel: Vitamin B12/Folate, Serum Panel; Future  -     Cancel: Vitamin B6; Future  -     Cancel: Vitamin D 25 hydroxy; Future  -     Cancel: CBC and differential; Future  -     Cancel: Comprehensive metabolic panel; Future  -     Cancel: Hemoglobin A1C; Future  -     Cancel: Iron Panel (Includes Ferritin, Iron Sat%, Iron, and TIBC); Future    Vitamin D deficiency  -     Cancel: Vitamin D 25 hydroxy; Future    Obstructive sleep apnea    Encounter for vaccination    Screening for thyroid disorder  -     Cancel: TSH, 3rd generation; Future    Post-surgical erectile dysfunction  -     sildenafil (VIAGRA) 100 mg tablet; Take 1 tablet (100 mg total) by mouth daily as needed for erectile dysfunction    Prediabetes    Other orders  -     Cancel: PSA, Total Screen; Future        No problem-specific Assessment & Plan notes found for this encounter. A/P: Doing ok and discussed labs. Pt to modify risk for DM. Will try viagra. Will up date his flu and pneumonia vaccines. Continue current treatment and RTC four months for routine. Subjective:      Patient ID: Eve Fuentes is a 59 y.o. male. WM RTC for f/u HTN, HLD, etc. Doing ok and no new issues, but reports having ED and is 18 months out of Prostate cancer sx. . Remains active w/o difficulty and no falls. SKin and prostate cancers are in remission. Chronic pain is manageable. BRITTANI is controlled. Due for vaccines. Recent labs ok except for pre diabetic range HgA1c.          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 ,  does not have any pertinent problems on file. ,   has a past surgical history that includes Knee surgery (Bilateral, 2006, 2008); Knee arthroscopy; Mohs surgery; Tonsillectomy and adenoidectomy; Uvulectomy; Joint replacement (Bilateral); GASTRECTOMY SLEEVE LAPAROSCOPIC (2019); Colonoscopy; and Abdominal surgery. ,  family history includes Arthritis in his maternal grandmother; Cancer in his mother; Heart disease in his father, maternal grandmother, other, and paternal grandmother; Hypotension in his mother; Liver cancer in his other and paternal grandfather; Lung cancer in his other; Other in his brother; Prostate cancer in his family and other; Stroke in his maternal grandfather, maternal grandmother, and mother; Thyroid cancer in his mother. ,   reports that he has never smoked. He has never used smokeless tobacco. He reports that he does not currently use alcohol. He reports that he does not use drugs. ,  is allergic to penicillin v..  Current Outpatient Medications   Medication Sig Dispense Refill   • aspirin (ECOTRIN LOW STRENGTH) 81 mg EC tablet Take 81 mg by mouth daily     • Calcium Carbonate-Vitamin D 600-400 MG-UNIT per tablet Take 1 tablet by mouth     • escitalopram (LEXAPRO) 20 mg tablet take 1 tablet by mouth once daily 30 tablet 5   • ketoconazole (NIZORAL) 2 % shampoo       • lisinopril (ZESTRIL) 10 mg tablet Take 1 tablet (10 mg total) by mouth daily 30 tablet 0   • Multiple Vitamins-Minerals (multivitamin with minerals) tablet Take 1 tablet by mouth daily     • sildenafil (VIAGRA) 100 mg tablet Take 1 tablet (100 mg total) by mouth daily as needed for erectile dysfunction 6 tablet 0   • traZODone (DESYREL) 50 mg tablet take 1 tablet by mouth once daily at bedtime 30 tablet 3   • zolpidem (AMBIEN) 10 mg tablet Take 1 tablet (10 mg total) by mouth daily at bedtime as needed for sleep 30 tablet 0     No current facility-administered medications for this visit. Review of Systems   Constitutional:  Negative for activity change, chills, diaphoresis, fatigue and fever. HENT: Negative. Eyes:  Negative for visual disturbance. Respiratory:  Negative for cough, chest tightness, shortness of breath and wheezing. Cardiovascular:  Negative for chest pain, palpitations and leg swelling. Gastrointestinal:  Negative for abdominal pain, constipation, diarrhea, nausea and vomiting. Endocrine: Negative for cold intolerance and heat intolerance. Genitourinary:  Negative for difficulty urinating, dysuria and frequency. Musculoskeletal:  Negative for arthralgias, gait problem and myalgias. Neurological:  Negative for dizziness, seizures, syncope, weakness, light-headedness and headaches. Psychiatric/Behavioral:  Negative for confusion, dysphoric mood and sleep disturbance. The patient is not nervous/anxious. PHQ-2/9 Depression Screening    Little interest or pleasure in doing things: 0 - not at all  Feeling down, depressed, or hopeless: 0 - not at all  PHQ-2 Score: 0  PHQ-2 Interpretation: Negative depression screen        Objective:  Vitals:    12/14/23 1527   BP: 128/86   Pulse: 67   Temp: 98.1 °F (36.7 °C)   SpO2: 96%   Weight: (!) 152 kg (336 lb 3.2 oz)   Height: 5' 7" (1.702 m)     Body mass index is 52.66 kg/m². Physical Exam  Vitals and nursing note reviewed. Constitutional:       General: He is not in acute distress. Appearance: Normal appearance. He is obese. He is not ill-appearing. HENT:      Head: Normocephalic and atraumatic.       Mouth/Throat:      Mouth: Mucous membranes are moist.   Eyes:      Extraocular Movements: Extraocular movements intact. Conjunctiva/sclera: Conjunctivae normal.      Pupils: Pupils are equal, round, and reactive to light. Neck:      Vascular: No carotid bruit. Cardiovascular:      Rate and Rhythm: Normal rate and regular rhythm. Heart sounds: Normal heart sounds. No murmur heard. Pulmonary:      Effort: Pulmonary effort is normal. No respiratory distress. Breath sounds: Normal breath sounds. No wheezing, rhonchi or rales. Abdominal:      General: Bowel sounds are normal. There is no distension. Palpations: Abdomen is soft. Tenderness: There is no abdominal tenderness. Musculoskeletal:      Cervical back: Neck supple. Right lower leg: No edema. Left lower leg: No edema. Neurological:      General: No focal deficit present. Mental Status: He is alert and oriented to person, place, and time. Mental status is at baseline. Psychiatric:         Mood and Affect: Mood normal.         Behavior: Behavior normal.         Thought Content:  Thought content normal.         Judgment: Judgment normal.

## 2023-12-18 LAB
VIT A SERPL-MCNC: 28.7 UG/DL (ref 22–69.5)
VIT B6 SERPL-MCNC: 73.9 UG/L (ref 3.4–65.2)

## 2023-12-19 LAB — VIT B1 BLD-SCNC: 172.3 NMOL/L (ref 66.5–200)

## 2023-12-21 DIAGNOSIS — I10 HYPERTENSION, UNSPECIFIED TYPE: ICD-10-CM

## 2023-12-21 RX ORDER — LISINOPRIL 10 MG/1
10 TABLET ORAL DAILY
Qty: 30 TABLET | Refills: 0 | Status: SHIPPED | OUTPATIENT
Start: 2023-12-21

## 2024-01-12 DIAGNOSIS — G47.9 SLEEP DISTURBANCE: ICD-10-CM

## 2024-01-12 RX ORDER — ZOLPIDEM TARTRATE 10 MG/1
10 TABLET ORAL
Qty: 30 TABLET | Refills: 0 | Status: SHIPPED | OUTPATIENT
Start: 2024-01-12

## 2024-01-15 DIAGNOSIS — I10 HYPERTENSION, UNSPECIFIED TYPE: ICD-10-CM

## 2024-01-15 RX ORDER — LISINOPRIL 10 MG/1
10 TABLET ORAL DAILY
Qty: 30 TABLET | Refills: 0 | Status: SHIPPED | OUTPATIENT
Start: 2024-01-15

## 2024-02-15 DIAGNOSIS — G47.9 SLEEP DISTURBANCE: ICD-10-CM

## 2024-02-15 RX ORDER — ZOLPIDEM TARTRATE 10 MG/1
10 TABLET ORAL
Qty: 30 TABLET | Refills: 2 | Status: SHIPPED | OUTPATIENT
Start: 2024-02-15

## 2024-02-16 DIAGNOSIS — F41.1 GENERALIZED ANXIETY DISORDER: ICD-10-CM

## 2024-02-16 DIAGNOSIS — I10 HYPERTENSION, UNSPECIFIED TYPE: ICD-10-CM

## 2024-02-16 RX ORDER — ESCITALOPRAM OXALATE 20 MG/1
TABLET ORAL
Qty: 90 TABLET | Refills: 1 | Status: SHIPPED | OUTPATIENT
Start: 2024-02-16

## 2024-02-16 RX ORDER — LISINOPRIL 10 MG/1
10 TABLET ORAL DAILY
Qty: 90 TABLET | Refills: 1 | Status: SHIPPED | OUTPATIENT
Start: 2024-02-16

## 2024-03-18 DIAGNOSIS — G47.9 SLEEP DISTURBANCE: ICD-10-CM

## 2024-03-18 RX ORDER — ZOLPIDEM TARTRATE 10 MG/1
10 TABLET ORAL
Qty: 30 TABLET | Refills: 2 | Status: SHIPPED | OUTPATIENT
Start: 2024-03-18

## 2024-04-10 ENCOUNTER — RA CDI HCC (OUTPATIENT)
Dept: OTHER | Facility: HOSPITAL | Age: 64
End: 2024-04-10

## 2024-04-16 DIAGNOSIS — G47.9 SLEEP DISTURBANCE: ICD-10-CM

## 2024-04-16 RX ORDER — ZOLPIDEM TARTRATE 10 MG/1
10 TABLET ORAL
Qty: 30 TABLET | Refills: 2 | Status: SHIPPED | OUTPATIENT
Start: 2024-04-16

## 2024-04-17 ENCOUNTER — OFFICE VISIT (OUTPATIENT)
Dept: INTERNAL MEDICINE CLINIC | Facility: CLINIC | Age: 64
End: 2024-04-17
Payer: COMMERCIAL

## 2024-04-17 ENCOUNTER — TELEPHONE (OUTPATIENT)
Dept: INTERNAL MEDICINE CLINIC | Facility: CLINIC | Age: 64
End: 2024-04-17

## 2024-04-17 VITALS
OXYGEN SATURATION: 99 % | WEIGHT: 315 LBS | TEMPERATURE: 96.3 F | DIASTOLIC BLOOD PRESSURE: 84 MMHG | SYSTOLIC BLOOD PRESSURE: 138 MMHG | HEART RATE: 54 BPM | BODY MASS INDEX: 49.44 KG/M2 | HEIGHT: 67 IN

## 2024-04-17 DIAGNOSIS — F41.1 GENERALIZED ANXIETY DISORDER: ICD-10-CM

## 2024-04-17 DIAGNOSIS — R73.03 PREDIABETES: ICD-10-CM

## 2024-04-17 DIAGNOSIS — Z23 ENCOUNTER FOR IMMUNIZATION: ICD-10-CM

## 2024-04-17 DIAGNOSIS — E55.9 VITAMIN D DEFICIENCY: ICD-10-CM

## 2024-04-17 DIAGNOSIS — C43.9 MALIGNANT MELANOMA OF SKIN (HCC): ICD-10-CM

## 2024-04-17 DIAGNOSIS — I10 ESSENTIAL HYPERTENSION: ICD-10-CM

## 2024-04-17 DIAGNOSIS — E66.01 MORBID (SEVERE) OBESITY DUE TO EXCESS CALORIES (HCC): Primary | ICD-10-CM

## 2024-04-17 DIAGNOSIS — C61 MALIGNANT NEOPLASM OF PROSTATE (HCC): ICD-10-CM

## 2024-04-17 DIAGNOSIS — E66.01 MORBID (SEVERE) OBESITY DUE TO EXCESS CALORIES (HCC): ICD-10-CM

## 2024-04-17 DIAGNOSIS — E78.2 MIXED HYPERLIPIDEMIA: ICD-10-CM

## 2024-04-17 DIAGNOSIS — Z00.00 WELL ADULT EXAM: Primary | ICD-10-CM

## 2024-04-17 PROCEDURE — 99396 PREV VISIT EST AGE 40-64: CPT | Performed by: INTERNAL MEDICINE

## 2024-04-17 NOTE — TELEPHONE ENCOUNTER
Called patient to inform him that the prescription was called in to pharmacy and to call for appointment for one month when he picks up the prescription

## 2024-04-17 NOTE — PROGRESS NOTES
Assessment/Plan:  Problem List Items Addressed This Visit          Cardiovascular and Mediastinum    Essential hypertension       Musculoskeletal and Integument    Malignant melanoma of skin (HCC)       Genitourinary    Malignant neoplasm of prostate (HCC)       Behavioral Health    Generalized anxiety disorder       Other    Vitamin D deficiency    Prediabetes    Morbid (severe) obesity due to excess calories (HCC)    Mixed hyperlipidemia     Other Visit Diagnoses       Well adult exam    -  Primary    Encounter for immunization        Relevant Orders    Zoster Vaccine Recombinant IM             Diagnoses and all orders for this visit:    Well adult exam    Encounter for immunization  -     Zoster Vaccine Recombinant IM    Essential hypertension    Malignant melanoma of skin (HCC)    Malignant neoplasm of prostate (HCC)    Generalized anxiety disorder    Vitamin D deficiency    Prediabetes    Mixed hyperlipidemia    Morbid (severe) obesity due to excess calories (HCC)        No problem-specific Assessment & Plan notes found for this encounter.    A/P: Doing well and co-morbidities are stable.  Labs are up to date except ?PSA. Pt see [facility] and instructed to check and see if it is up to date. Will vaccinate for HZV. Discussed wt loss meds and given info. Pt to check with insurance for coverage. . No mental or physical restrictions. No evidence of communicable diseases. Continue current treatment and RTC one year for annual and four months for routine. If gets wt loss med, will need f/u four weeks after starting med..        Subjective:      Patient ID: Gibran Calvillo is a 63 y.o. male.    WM presents for a well exam. Doing ok and no c/o's.  Chronic pain is manageable. Prostate Ca and melanoma are in remission. BRITTANI is controlled. Remains active w/o difficulty and no falls. Denies depresion. No suicidal or violent ideations. Working on intermediate. No recent travel. No fever, chills, or sweats. No unexplained wt  change. Denies CP, SOB, palpitations, edema, orthopnea, or PND. No sz or syncope. No changes in bowel or bladder habits. No trouble swallowing. Appetite and sleep are good. Sees both  a DDS and an eye doctor. No change in PMH, PSH, ALL, OH, SH, or Fhx. Currently due for vaccines and ?PSA .              The following portions of the patient's history were reviewed and updated as appropriate:   He has a past medical history of Arthritis, 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HCC), Cancer (HCC), COVID-19, DVT (deep venous thrombosis) (HCC), Generalized anxiety disorder, Hypertension, Melanoma (HCC), Mobitz (type) I (Wenckebach's) atrioventricular block, Obesity, Primary osteoarthritis of both knees, and Sleep apnea.,  does not have any pertinent problems on file.,   has a past surgical history that includes Knee surgery (Bilateral, 2006, 2008); Knee arthroscopy; Mohs surgery; Tonsillectomy and adenoidectomy; Uvulectomy; Joint replacement (Bilateral); GASTRECTOMY SLEEVE LAPAROSCOPIC (2019); Colonoscopy; and Abdominal surgery.,  family history includes Arthritis in his maternal grandmother; Cancer in his mother; Heart disease in his father, maternal grandmother, other, and paternal grandmother; Hypotension in his mother; Liver cancer in his other and paternal grandfather; Lung cancer in his other; Other in his brother; Prostate cancer in his family and other; Stroke in his maternal grandfather, maternal grandmother, and mother; Thyroid cancer in his mother.,   reports that he has never smoked. He has never been exposed to tobacco smoke. He has never used smokeless tobacco. He reports that he does not currently use alcohol. He reports that he does not use drugs.,  is allergic to penicillin v..  Current Outpatient Medications   Medication Sig Dispense Refill    aspirin (ECOTRIN LOW STRENGTH) 81 mg EC tablet Take 81 mg by mouth daily      Calcium Carbonate-Vitamin D 600-400 MG-UNIT per tablet Take 1 tablet by mouth      escitalopram  "(LEXAPRO) 20 mg tablet take 1 tablet by mouth once daily 90 tablet 1    ketoconazole (NIZORAL) 2 % shampoo        lisinopril (ZESTRIL) 10 mg tablet take 1 tablet by mouth once daily 90 tablet 1    Multiple Vitamins-Minerals (multivitamin with minerals) tablet Take 1 tablet by mouth daily      sildenafil (VIAGRA) 100 mg tablet Take 1 tablet (100 mg total) by mouth daily as needed for erectile dysfunction 6 tablet 0    traZODone (DESYREL) 50 mg tablet take 1 tablet by mouth once daily at bedtime 30 tablet 3    zolpidem (AMBIEN) 10 mg tablet Take 1 tablet (10 mg total) by mouth daily at bedtime as needed for sleep 30 tablet 2     No current facility-administered medications for this visit.       Review of Systems   Constitutional:  Negative for activity change, chills, diaphoresis, fatigue and fever.   HENT: Negative.     Eyes:  Negative for visual disturbance.   Respiratory:  Negative for cough, chest tightness, shortness of breath and wheezing.    Cardiovascular:  Negative for chest pain, palpitations and leg swelling.   Gastrointestinal:  Negative for abdominal pain, constipation, diarrhea, nausea and vomiting.   Endocrine: Negative for cold intolerance and heat intolerance.   Genitourinary:  Negative for difficulty urinating, dysuria and frequency.   Musculoskeletal:  Negative for arthralgias, gait problem and myalgias.   Neurological:  Negative for dizziness, seizures, syncope, weakness, light-headedness and headaches.   Psychiatric/Behavioral:  Negative for confusion, dysphoric mood and sleep disturbance. The patient is not nervous/anxious.        PHQ-2/9 Depression Screening    Little interest or pleasure in doing things: 0 - not at all  Feeling down, depressed, or hopeless: 0 - not at all  PHQ-2 Score: 0  PHQ-2 Interpretation: Negative depression screen        Objective:  Vitals:    04/17/24 0753   BP: 138/84   Pulse: (!) 54   Temp: (!) 96.3 °F (35.7 °C)   SpO2: 99%   Weight: (!) 159 kg (351 lb)   Height: 5' 7\" " (1.702 m)     Body mass index is 54.97 kg/m².     Physical Exam  Vitals and nursing note reviewed.   Constitutional:       General: He is not in acute distress.     Appearance: Normal appearance. He is not ill-appearing.   HENT:      Head: Normocephalic and atraumatic.      Mouth/Throat:      Mouth: Mucous membranes are moist.   Eyes:      Extraocular Movements: Extraocular movements intact.      Conjunctiva/sclera: Conjunctivae normal.      Pupils: Pupils are equal, round, and reactive to light.   Neck:      Vascular: No carotid bruit.   Cardiovascular:      Rate and Rhythm: Normal rate and regular rhythm.      Heart sounds: Normal heart sounds. No murmur heard.  Pulmonary:      Effort: Pulmonary effort is normal. No respiratory distress.      Breath sounds: Normal breath sounds. No wheezing, rhonchi or rales.   Abdominal:      General: Bowel sounds are normal. There is no distension.      Palpations: Abdomen is soft.      Tenderness: There is no abdominal tenderness.   Musculoskeletal:      Cervical back: Neck supple.      Right lower leg: No edema.      Left lower leg: No edema.   Neurological:      General: No focal deficit present.      Mental Status: He is alert and oriented to person, place, and time. Mental status is at baseline.   Psychiatric:         Mood and Affect: Mood normal.         Behavior: Behavior normal.         Thought Content: Thought content normal.         Judgment: Judgment normal.

## 2024-04-17 NOTE — TELEPHONE ENCOUNTER
Patient's insurance will cover the following.  Patient states some are difficult to get.      Contrave.    Qsymia as second choice.     Adipex-P.    [facility] [address]

## 2024-05-15 ENCOUNTER — TELEPHONE (OUTPATIENT)
Age: 64
End: 2024-05-15

## 2024-05-15 NOTE — TELEPHONE ENCOUNTER
Patient asked if someone could please call to schedule a 1 month follow up appointment.  Patient can be reached at [phone].

## 2024-05-15 NOTE — TELEPHONE ENCOUNTER
Patient called requesting refill for zolpidem (AMBIEN) 10 mg . Patient made aware medication was refilled on 4/16/24 for 30 with 2 refills to [facility] pharmacy. Patient instructed to contact the pharmacy to obtain refills of medication. Patient verbalized understanding.

## 2024-06-12 ENCOUNTER — RA CDI HCC (OUTPATIENT)
Dept: OTHER | Facility: HOSPITAL | Age: 64
End: 2024-06-12

## 2024-06-19 ENCOUNTER — OFFICE VISIT (OUTPATIENT)
Dept: INTERNAL MEDICINE CLINIC | Facility: CLINIC | Age: 64
End: 2024-06-19
Payer: COMMERCIAL

## 2024-06-19 VITALS
BODY MASS INDEX: 49.44 KG/M2 | WEIGHT: 315 LBS | HEART RATE: 55 BPM | SYSTOLIC BLOOD PRESSURE: 128 MMHG | TEMPERATURE: 98 F | HEIGHT: 67 IN | OXYGEN SATURATION: 97 % | DIASTOLIC BLOOD PRESSURE: 88 MMHG

## 2024-06-19 DIAGNOSIS — Z23 ENCOUNTER FOR IMMUNIZATION: ICD-10-CM

## 2024-06-19 DIAGNOSIS — E66.01 MORBID (SEVERE) OBESITY DUE TO EXCESS CALORIES (HCC): Primary | ICD-10-CM

## 2024-06-19 DIAGNOSIS — Z23 ENCOUNTER FOR VACCINATION: ICD-10-CM

## 2024-06-19 DIAGNOSIS — G47.9 SLEEP DISTURBANCE: ICD-10-CM

## 2024-06-19 PROCEDURE — 90750 HZV VACC RECOMBINANT IM: CPT

## 2024-06-19 PROCEDURE — 99214 OFFICE O/P EST MOD 30 MIN: CPT | Performed by: INTERNAL MEDICINE

## 2024-06-19 PROCEDURE — 90471 IMMUNIZATION ADMIN: CPT

## 2024-06-19 RX ORDER — ZOLPIDEM TARTRATE 10 MG/1
10 TABLET ORAL
Qty: 30 TABLET | Refills: 2 | Status: SHIPPED | OUTPATIENT
Start: 2024-06-19

## 2024-06-19 NOTE — PROGRESS NOTES
Assessment/Plan:  Problem List Items Addressed This Visit          Other    Morbid (severe) obesity due to excess calories (HCC) - Primary    Relevant Medications    Naltrexone-buPROPion HCl ER 8-90 MG TB12     Other Visit Diagnoses       Sleep disturbance        Relevant Medications    zolpidem (AMBIEN) 10 mg tablet    Encounter for vaccination        Relevant Medications    Zoster Vac Recomb Adjuvanted 50 MCG/0.5ML SUSR             Diagnoses and all orders for this visit:    Morbid (severe) obesity due to excess calories (HCC)  -     Naltrexone-buPROPion HCl ER 8-90 MG TB12; Take 1 tablet by mouth 2 (two) times a day    Sleep disturbance  -     zolpidem (AMBIEN) 10 mg tablet; Take 1 tablet (10 mg total) by mouth daily at bedtime as needed for sleep    Encounter for vaccination  -     Zoster Vac Recomb Adjuvanted 50 MCG/0.5ML SUSR; Inject 1 Dose into a muscle once for 1 dose    Other orders  -     Cancel: Comprehensive metabolic panel; Future  -     Cancel: Hemoglobin A1C; Future  -     Cancel: LDL cholesterol, direct; Future  -     Cancel: Triglycerides; Future  -     Cancel: Albumin / creatinine urine ratio; Future  -     Cancel: Zoster Vaccine Recombinant IM        No problem-specific Assessment & Plan notes found for this encounter.  A/P: Doing ok, but no wt loss, but not taking the med correctly. Will adjust dose. Up date HZV. Continue current treatment and RTC three months for routine.     Subjective:      Patient ID: Gibran Calvillo is a 63 y.o. male.    WM RTC for f/u after starting contrave for wt loss. Reports tolerating the med. No CP, SOB, edema, or palpitations. No new sleep issues. Reports stretching the med and not taking it BID.        The following portions of the patient's history were reviewed and updated as appropriate:   He has a past medical history of Arthritis, 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HCC), Cancer (HCC), COVID-19, DVT (deep venous thrombosis) ([facility]), Generalized anxiety disorder,  Hypertension, Melanoma (HCC), Mobitz (type) I (Wenckebach's) atrioventricular block, Obesity, Primary osteoarthritis of both knees, and Sleep apnea.,  does not have any pertinent problems on file.,   has a past surgical history that includes Knee surgery (Bilateral, 2006, 2008); Knee arthroscopy; Mohs surgery; Tonsillectomy and adenoidectomy; Uvulectomy; Joint replacement (Bilateral); GASTRECTOMY SLEEVE LAPAROSCOPIC (2019); Colonoscopy; and Abdominal surgery.,  family history includes Arthritis in his maternal grandmother; Cancer in his mother; Heart disease in his father, maternal grandmother, other, and paternal grandmother; Hypotension in his mother; Liver cancer in his other and paternal grandfather; Lung cancer in his other; Other in his brother; Prostate cancer in his family and other; Stroke in his maternal grandfather, maternal grandmother, and mother; Thyroid cancer in his mother.,   reports that he has never smoked. He has never been exposed to tobacco smoke. He has never used smokeless tobacco. He reports that he does not currently use alcohol. He reports that he does not use drugs.,  is allergic to penicillin v..  Current Outpatient Medications   Medication Sig Dispense Refill    aspirin (ECOTRIN LOW STRENGTH) 81 mg EC tablet Take 81 mg by mouth daily      Calcium Carbonate-Vitamin D 600-400 MG-UNIT per tablet Take 1 tablet by mouth      escitalopram (LEXAPRO) 20 mg tablet take 1 tablet by mouth once daily 90 tablet 1    ketoconazole (NIZORAL) 2 % shampoo        lisinopril (ZESTRIL) 10 mg tablet take 1 tablet by mouth once daily 90 tablet 1    Multiple Vitamins-Minerals (multivitamin with minerals) tablet Take 1 tablet by mouth daily      Naltrexone-buPROPion HCl ER 8-90 MG TB12 One po q AM x 7 and then one po bid x 7 and then two in the AM and one in the PM x 7, then two po bid. 120 tablet 2    Naltrexone-buPROPion HCl ER 8-90 MG TB12 Take 1 tablet by mouth 2 (two) times a day 180 tablet 1    sildenafil  "(VIAGRA) 100 mg tablet Take 1 tablet (100 mg total) by mouth daily as needed for erectile dysfunction 6 tablet 0    traZODone (DESYREL) 50 mg tablet take 1 tablet by mouth once daily at bedtime 30 tablet 3    zolpidem (AMBIEN) 10 mg tablet Take 1 tablet (10 mg total) by mouth daily at bedtime as needed for sleep 30 tablet 2    Zoster Vac Recomb Adjuvanted 50 MCG/0.5ML SUSR Inject 1 Dose into a muscle once for 1 dose 1 each 1     No current facility-administered medications for this visit.       Review of Systems   Constitutional:  Negative for activity change, chills, diaphoresis, fatigue and fever.   HENT: Negative.     Eyes:  Negative for visual disturbance.   Respiratory:  Negative for cough, chest tightness, shortness of breath and wheezing.    Cardiovascular:  Negative for chest pain, palpitations and leg swelling.   Gastrointestinal:  Negative for abdominal pain, constipation, diarrhea, nausea and vomiting.   Endocrine: Negative for cold intolerance and heat intolerance.   Genitourinary:  Negative for difficulty urinating, dysuria and frequency.   Musculoskeletal:  Negative for arthralgias, gait problem and myalgias.   Neurological:  Negative for dizziness, seizures, syncope, weakness, light-headedness and headaches.   Psychiatric/Behavioral:  Negative for confusion, dysphoric mood and sleep disturbance. The patient is not nervous/anxious.        PHQ-2/9 Depression Screening    Little interest or pleasure in doing things: 0 - not at all  Feeling down, depressed, or hopeless: 0 - not at all  PHQ-2 Score: 0  PHQ-2 Interpretation: Negative depression screen        Objective:  Vitals:    06/19/24 0744   BP: 128/88   Pulse: 55   Temp: 98 °F (36.7 °C)   SpO2: 97%   Weight: (!) 160 kg (351 lb 12.8 oz)   Height: 5' 7\" (1.702 m)     Body mass index is 55.1 kg/m².     Physical Exam  Vitals and nursing note reviewed.   Constitutional:       General: He is not in acute distress.     Appearance: Normal appearance. He is " not ill-appearing.   HENT:      Head: Normocephalic and atraumatic.      Mouth/Throat:      Mouth: Mucous membranes are moist.   Eyes:      Extraocular Movements: Extraocular movements intact.      Conjunctiva/sclera: Conjunctivae normal.      Pupils: Pupils are equal, round, and reactive to light.   Neck:      Vascular: No carotid bruit.   Cardiovascular:      Rate and Rhythm: Normal rate and regular rhythm.      Heart sounds: Normal heart sounds. No murmur heard.  Pulmonary:      Effort: Pulmonary effort is normal. No respiratory distress.      Breath sounds: Normal breath sounds. No wheezing, rhonchi or rales.   Abdominal:      General: Bowel sounds are normal. There is no distension.      Palpations: Abdomen is soft.      Tenderness: There is no abdominal tenderness.   Musculoskeletal:      Cervical back: Neck supple.      Right lower leg: No edema.      Left lower leg: No edema.   Neurological:      General: No focal deficit present.      Mental Status: He is alert and oriented to person, place, and time. Mental status is at baseline.   Psychiatric:         Mood and Affect: Mood normal.         Behavior: Behavior normal.         Thought Content: Thought content normal.         Judgment: Judgment normal.

## 2024-07-19 ENCOUNTER — TELEPHONE (OUTPATIENT)
Dept: INTERNAL MEDICINE CLINIC | Facility: CLINIC | Age: 64
End: 2024-07-19

## 2024-07-19 NOTE — TELEPHONE ENCOUNTER
Patient called for refill on his Ambien - advised him that a Rx was sent over to the pharmacy back on 6/19/24 for 30 days with 2 refills - patient will call pharmacy for remaining refills

## 2024-08-16 ENCOUNTER — OFFICE VISIT (OUTPATIENT)
Dept: URGENT CARE | Facility: CLINIC | Age: 64
End: 2024-08-16
Payer: COMMERCIAL

## 2024-08-16 VITALS
HEART RATE: 68 BPM | WEIGHT: 315 LBS | HEIGHT: 68 IN | BODY MASS INDEX: 47.74 KG/M2 | TEMPERATURE: 97.9 F | DIASTOLIC BLOOD PRESSURE: 78 MMHG | RESPIRATION RATE: 20 BRPM | SYSTOLIC BLOOD PRESSURE: 122 MMHG | OXYGEN SATURATION: 97 %

## 2024-08-16 DIAGNOSIS — L23.7 POISON IVY DERMATITIS: Primary | ICD-10-CM

## 2024-08-16 PROCEDURE — 99213 OFFICE O/P EST LOW 20 MIN: CPT | Performed by: FAMILY MEDICINE

## 2024-08-16 PROCEDURE — 96372 THER/PROPH/DIAG INJ SC/IM: CPT | Performed by: NURSE PRACTITIONER

## 2024-08-16 RX ORDER — DEXAMETHASONE SODIUM PHOSPHATE 10 MG/ML
10 INJECTION, SOLUTION INTRAMUSCULAR; INTRAVENOUS ONCE
Status: COMPLETED | OUTPATIENT
Start: 2024-08-16 | End: 2024-08-16

## 2024-08-16 RX ORDER — PREDNISONE 10 MG/1
TABLET ORAL
Qty: 24 TABLET | Refills: 0 | Status: SHIPPED | OUTPATIENT
Start: 2024-08-16

## 2024-08-16 RX ADMIN — DEXAMETHASONE SODIUM PHOSPHATE 10 MG: 10 INJECTION, SOLUTION INTRAMUSCULAR; INTRAVENOUS at 19:04

## 2024-08-16 NOTE — PROGRESS NOTES
[facility]        NAME: Gibran Calvillo is a 63 y.o. male  : 1960    MRN: [id number]  DATE: 2024  TIME: 7:16 PM    Assessment and Plan   Poison ivy dermatitis [L23.7]  1. Poison ivy dermatitis  dexamethasone (PF) (DECADRON) injection 10 mg    predniSONE 10 mg tablet            Patient Instructions       Follow up with PCP in 3-5 days.  Proceed to  ER if symptoms worsen.    If tests have been performed at [facility], our office will contact you with results if changes need to be made to the care plan discussed with you at the visit.  You can review your full results on [facility].        You have been prescribed prednisone. Take with food. Do NOT take any NSAID products (motrin, ibuprofen, aleve, advil) while taking this medication.  You may take tylenol.     You have been prescribed prednisone.   You have been prescribed prednisone. Take with food. Do NOT take any NSAID products (motrin, ibuprofen, aleve, advil) while taking this medication.  You may take tylenol.     You are to wash with joselyn dish liquid.  Do NOT use loofas, sponges.  Do NOT re wear clothing or re use towels or wash cloths.   Follow up with your PCP in 3-5 days  Go to the ED if symptoms worsen     Start the oral prednisone on 24    Chief Complaint     Chief Complaint   Patient presents with    Poison Ivy     Was cutting plants one week ago, poison progressing from arms to entire lower body. Has been using OTC lotions/ cortisone cream with minimal improvement.         History of Present Illness       This is a 63 year old male who states was working in the yard last week and now has poison in the gluteal fold and arms. He has been using OTC w/o relied. PMH is listed     Poison Ivy        Review of Systems   Review of Systems   Constitutional: Negative.    HENT: Negative.     Eyes: Negative.    Respiratory: Negative.     Cardiovascular: Negative.    Gastrointestinal: Negative.    Endocrine: Negative.     Genitourinary: Negative.    Musculoskeletal: Negative.    Skin:  Positive for rash.   Allergic/Immunologic: Negative.    Neurological: Negative.    Hematological: Negative.    Psychiatric/Behavioral: Negative.           Current Medications       Current Outpatient Medications:     aspirin (ECOTRIN LOW STRENGTH) 81 mg EC tablet, Take 81 mg by mouth daily, Disp: , Rfl:     escitalopram (LEXAPRO) 20 mg tablet, take 1 tablet by mouth once daily, Disp: 90 tablet, Rfl: 1    ketoconazole (NIZORAL) 2 % shampoo,  , Disp: , Rfl:     lisinopril (ZESTRIL) 10 mg tablet, take 1 tablet by mouth once daily, Disp: 90 tablet, Rfl: 1    Multiple Vitamins-Minerals (multivitamin with minerals) tablet, Take 1 tablet by mouth daily, Disp: , Rfl:     Naltrexone-buPROPion HCl ER 8-90 MG TB12, One po q AM x 7 and then one po bid x 7 and then two in the AM and one in the PM x 7, then two po bid., Disp: 120 tablet, Rfl: 2    Naltrexone-buPROPion HCl ER 8-90 MG TB12, Take 1 tablet by mouth 2 (two) times a day, Disp: 180 tablet, Rfl: 1    predniSONE 10 mg tablet, Take 5 tabs po x 2 days; 4 tabs po x 2 days; 3 tabs po x 1 day; 2 tabs po x 1 day. 1 tab po x 1 day., Disp: 24 tablet, Rfl: 0    sildenafil (VIAGRA) 100 mg tablet, Take 1 tablet (100 mg total) by mouth daily as needed for erectile dysfunction, Disp: 6 tablet, Rfl: 0    traZODone (DESYREL) 50 mg tablet, take 1 tablet by mouth once daily at bedtime, Disp: 30 tablet, Rfl: 3    zolpidem (AMBIEN) 10 mg tablet, Take 1 tablet (10 mg total) by mouth daily at bedtime as needed for sleep, Disp: 30 tablet, Rfl: 2    Calcium Carbonate-Vitamin D 600-400 MG-UNIT per tablet, Take 1 tablet by mouth, Disp: , Rfl:   No current facility-administered medications for this visit.    Current Allergies     Allergies as of 08/16/2024 - Reviewed 08/16/2024   Allergen Reaction Noted    Penicillin v Anaphylaxis 10/30/2015    Penicillin g Other (See Comments) 07/30/2024            The following portions of the  "patient's history were reviewed and updated as appropriate: allergies, current medications, past family history, past medical history, past social history, past surgical history and problem list.     Past Medical History:   Diagnosis Date    Arthritis     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HCC)     Cancer (HCC)     of back    COVID-19     DVT (deep venous thrombosis) (HCC)     post TKR    Generalized anxiety disorder     Hypertension     Melanoma (HCC)     Mobitz (type) I (Wenckebach's) atrioventricular block     Obesity     Primary osteoarthritis of both knees     Sleep apnea        Past Surgical History:   Procedure Laterality Date    ABDOMINAL SURGERY      COLONOSCOPY      GASTRECTOMY SLEEVE LAPAROSCOPIC  2019    JOINT REPLACEMENT Bilateral     KNEE ARTHROSCOPY      Therapeutic; Last Assessed: 10/30/2015    KNEE SURGERY Bilateral 2006, 2008    Knee replacement    MOHS SURGERY      Mohs Micrographic Surg Trunk First Stage, up to 5 Specimens    TONSILLECTOMY AND ADENOIDECTOMY      UVULECTOMY      Last Assessed: 10/30/2015       Family History   Problem Relation Age of Onset    Hypotension Mother     Cancer Mother     Thyroid cancer Mother     Stroke Mother         of unknown cause    Heart disease Father         cardiac disorder    Other Brother         Brain tumor    Arthritis Maternal Grandmother     Stroke Maternal Grandmother         of unknown cause    Heart disease Maternal Grandmother         cardiac disorder    Stroke Maternal Grandfather         of unknown cause    Heart disease Paternal Grandmother         cardiac disorder    Liver cancer Paternal Grandfather     Liver cancer Other     Heart disease Other         cardiac disorder    Lung cancer Other     Prostate cancer Other     Prostate cancer Family          Medications have been verified.        Objective   /78   Pulse 68   Temp 97.9 °F (36.6 °C)   Resp 20   Ht 5' 8\" (1.727 m)   Wt (!) 162 kg (358 lb)   SpO2 97%   BMI 54.43 kg/m²   No LMP for " male patient.       Physical Exam     Physical Exam  Vitals and nursing note reviewed.   Constitutional:       General: He is not in acute distress.     Appearance: Normal appearance. He is obese. He is not ill-appearing, toxic-appearing or diaphoretic.   HENT:      Head: Normocephalic and atraumatic.   Eyes:      Extraocular Movements: Extraocular movements intact.   Cardiovascular:      Rate and Rhythm: Normal rate.      Pulses: Normal pulses.   Pulmonary:      Effort: Pulmonary effort is normal.   Musculoskeletal:         General: Normal range of motion.      Cervical back: Normal range of motion.   Skin:     General: Skin is warm and dry.      Capillary Refill: Capillary refill takes less than 2 seconds.      Findings: Rash present.      Comments: Dry scabbed lesions on arms  Gluteal fold assessment declined by pt    Neurological:      General: No focal deficit present.      Mental Status: He is alert and oriented to person, place, and time.   Psychiatric:         Mood and Affect: Mood normal.         Behavior: Behavior normal.         Thought Content: Thought content normal.         Judgment: Judgment normal.

## 2024-08-16 NOTE — PATIENT INSTRUCTIONS
You have been prescribed prednisone. Take with food. Do NOT take any NSAID products (motrin, ibuprofen, aleve, advil) while taking this medication.  You may take tylenol.     You have been prescribed prednisone.   You have been prescribed prednisone. Take with food. Do NOT take any NSAID products (motrin, ibuprofen, aleve, advil) while taking this medication.  You may take tylenol.     You are to wash with joselyn dish liquid.  Do NOT use loofas, sponges.  Do NOT re wear clothing or re use towels or wash cloths.   Follow up with your PCP in 3-5 days  Go to the ED if symptoms worsen     Start the oral prednisone on 8/17/24

## 2024-09-08 DIAGNOSIS — F41.1 GENERALIZED ANXIETY DISORDER: ICD-10-CM

## 2024-09-08 DIAGNOSIS — I10 HYPERTENSION, UNSPECIFIED TYPE: ICD-10-CM

## 2024-09-08 RX ORDER — LISINOPRIL 10 MG/1
10 TABLET ORAL DAILY
Qty: 90 TABLET | Refills: 1 | Status: SHIPPED | OUTPATIENT
Start: 2024-09-08

## 2024-09-08 RX ORDER — ESCITALOPRAM OXALATE 20 MG/1
TABLET ORAL
Qty: 90 TABLET | Refills: 1 | Status: SHIPPED | OUTPATIENT
Start: 2024-09-08

## 2024-09-09 ENCOUNTER — RA CDI HCC (OUTPATIENT)
Dept: OTHER | Facility: HOSPITAL | Age: 64
End: 2024-09-09

## 2024-09-16 DIAGNOSIS — G47.9 SLEEP DISTURBANCE: ICD-10-CM

## 2024-09-16 RX ORDER — ZOLPIDEM TARTRATE 10 MG/1
10 TABLET ORAL
Qty: 30 TABLET | Refills: 2 | Status: SHIPPED | OUTPATIENT
Start: 2024-09-16

## 2024-09-19 ENCOUNTER — APPOINTMENT (OUTPATIENT)
Dept: LAB | Facility: CLINIC | Age: 64
End: 2024-09-19
Payer: COMMERCIAL

## 2024-09-19 ENCOUNTER — APPOINTMENT (OUTPATIENT)
Dept: RADIOLOGY | Facility: CLINIC | Age: 64
End: 2024-09-19
Payer: COMMERCIAL

## 2024-09-19 ENCOUNTER — OFFICE VISIT (OUTPATIENT)
Dept: INTERNAL MEDICINE CLINIC | Facility: CLINIC | Age: 64
End: 2024-09-19
Payer: COMMERCIAL

## 2024-09-19 VITALS
TEMPERATURE: 97.9 F | HEIGHT: 68 IN | BODY MASS INDEX: 47.74 KG/M2 | HEART RATE: 57 BPM | OXYGEN SATURATION: 96 % | SYSTOLIC BLOOD PRESSURE: 128 MMHG | WEIGHT: 315 LBS | DIASTOLIC BLOOD PRESSURE: 86 MMHG

## 2024-09-19 DIAGNOSIS — I10 ESSENTIAL HYPERTENSION: Primary | ICD-10-CM

## 2024-09-19 DIAGNOSIS — R13.10 DYSPHAGIA, UNSPECIFIED TYPE: ICD-10-CM

## 2024-09-19 DIAGNOSIS — Z98.84 S/P GASTRIC BYPASS: ICD-10-CM

## 2024-09-19 DIAGNOSIS — I10 ESSENTIAL HYPERTENSION: ICD-10-CM

## 2024-09-19 DIAGNOSIS — C61 MALIGNANT NEOPLASM OF PROSTATE (HCC): ICD-10-CM

## 2024-09-19 DIAGNOSIS — R11.2 NAUSEA AND VOMITING, UNSPECIFIED VOMITING TYPE: ICD-10-CM

## 2024-09-19 DIAGNOSIS — F41.1 GENERALIZED ANXIETY DISORDER: ICD-10-CM

## 2024-09-19 DIAGNOSIS — K52.9 CHRONIC DIARRHEA: ICD-10-CM

## 2024-09-19 DIAGNOSIS — E78.2 MIXED HYPERLIPIDEMIA: ICD-10-CM

## 2024-09-19 DIAGNOSIS — E66.01 MORBID (SEVERE) OBESITY DUE TO EXCESS CALORIES (HCC): ICD-10-CM

## 2024-09-19 DIAGNOSIS — G47.33 OBSTRUCTIVE SLEEP APNEA: ICD-10-CM

## 2024-09-19 DIAGNOSIS — R73.03 PREDIABETES: ICD-10-CM

## 2024-09-19 DIAGNOSIS — C43.9 MALIGNANT MELANOMA OF SKIN (HCC): ICD-10-CM

## 2024-09-19 LAB
ALBUMIN SERPL BCG-MCNC: 3.9 G/DL (ref 3.5–5)
ALP SERPL-CCNC: 102 U/L (ref 34–104)
ALT SERPL W P-5'-P-CCNC: 21 U/L (ref 7–52)
AMYLASE SERPL-CCNC: 27 IU/L (ref 29–103)
ANION GAP SERPL CALCULATED.3IONS-SCNC: 5 MMOL/L (ref 4–13)
AST SERPL W P-5'-P-CCNC: 14 U/L (ref 13–39)
BASOPHILS # BLD AUTO: 0.08 THOUSANDS/ΜL (ref 0–0.1)
BASOPHILS NFR BLD AUTO: 1 % (ref 0–1)
BILIRUB SERPL-MCNC: 0.77 MG/DL (ref 0.2–1)
BUN SERPL-MCNC: 21 MG/DL (ref 5–25)
CALCIUM SERPL-MCNC: 8.8 MG/DL (ref 8.4–10.2)
CHLORIDE SERPL-SCNC: 107 MMOL/L (ref 96–108)
CO2 SERPL-SCNC: 29 MMOL/L (ref 21–32)
CREAT SERPL-MCNC: 0.7 MG/DL (ref 0.6–1.3)
CREAT UR-MCNC: 150.1 MG/DL
EOSINOPHIL # BLD AUTO: 0.13 THOUSAND/ΜL (ref 0–0.61)
EOSINOPHIL NFR BLD AUTO: 2 % (ref 0–6)
ERYTHROCYTE [DISTWIDTH] IN BLOOD BY AUTOMATED COUNT: 14.4 % (ref 11.6–15.1)
EST. AVERAGE GLUCOSE BLD GHB EST-MCNC: 128 MG/DL
GFR SERPL CREATININE-BSD FRML MDRD: 101 ML/MIN/1.73SQ M
GLUCOSE P FAST SERPL-MCNC: 104 MG/DL (ref 65–99)
HBA1C MFR BLD: 6.1 %
HCT VFR BLD AUTO: 49.1 % (ref 36.5–49.3)
HGB BLD-MCNC: 15.7 G/DL (ref 12–17)
IMM GRANULOCYTES # BLD AUTO: 0.02 THOUSAND/UL (ref 0–0.2)
IMM GRANULOCYTES NFR BLD AUTO: 0 % (ref 0–2)
LDLC SERPL DIRECT ASSAY-MCNC: 118 MG/DL (ref 0–100)
LIPASE SERPL-CCNC: 29 U/L (ref 11–82)
LYMPHOCYTES # BLD AUTO: 1.32 THOUSANDS/ΜL (ref 0.6–4.47)
LYMPHOCYTES NFR BLD AUTO: 21 % (ref 14–44)
MCH RBC QN AUTO: 27.7 PG (ref 26.8–34.3)
MCHC RBC AUTO-ENTMCNC: 32 G/DL (ref 31.4–37.4)
MCV RBC AUTO: 87 FL (ref 82–98)
MICROALBUMIN UR-MCNC: <7 MG/L
MONOCYTES # BLD AUTO: 0.62 THOUSAND/ΜL (ref 0.17–1.22)
MONOCYTES NFR BLD AUTO: 10 % (ref 4–12)
NEUTROPHILS # BLD AUTO: 4.2 THOUSANDS/ΜL (ref 1.85–7.62)
NEUTS SEG NFR BLD AUTO: 66 % (ref 43–75)
NRBC BLD AUTO-RTO: 0 /100 WBCS
PLATELET # BLD AUTO: 178 THOUSANDS/UL (ref 149–390)
PMV BLD AUTO: 10.1 FL (ref 8.9–12.7)
POTASSIUM SERPL-SCNC: 4.3 MMOL/L (ref 3.5–5.3)
PROT SERPL-MCNC: 6.4 G/DL (ref 6.4–8.4)
RBC # BLD AUTO: 5.67 MILLION/UL (ref 3.88–5.62)
SODIUM SERPL-SCNC: 141 MMOL/L (ref 135–147)
TRIGL SERPL-MCNC: 76 MG/DL
WBC # BLD AUTO: 6.37 THOUSAND/UL (ref 4.31–10.16)

## 2024-09-19 PROCEDURE — 82150 ASSAY OF AMYLASE: CPT

## 2024-09-19 PROCEDURE — 83721 ASSAY OF BLOOD LIPOPROTEIN: CPT

## 2024-09-19 PROCEDURE — 83036 HEMOGLOBIN GLYCOSYLATED A1C: CPT

## 2024-09-19 PROCEDURE — 99214 OFFICE O/P EST MOD 30 MIN: CPT | Performed by: INTERNAL MEDICINE

## 2024-09-19 PROCEDURE — 36415 COLL VENOUS BLD VENIPUNCTURE: CPT

## 2024-09-19 PROCEDURE — 84478 ASSAY OF TRIGLYCERIDES: CPT

## 2024-09-19 PROCEDURE — 74022 RADEX COMPL AQT ABD SERIES: CPT

## 2024-09-19 PROCEDURE — 85025 COMPLETE CBC W/AUTO DIFF WBC: CPT

## 2024-09-19 PROCEDURE — 83690 ASSAY OF LIPASE: CPT

## 2024-09-19 PROCEDURE — 80053 COMPREHEN METABOLIC PANEL: CPT

## 2024-09-19 NOTE — ASSESSMENT & PLAN NOTE
Orders:    Comprehensive metabolic panel; Future    LDL cholesterol, direct; Future    Triglycerides; Future

## 2024-09-19 NOTE — ASSESSMENT & PLAN NOTE
Orders:    CBC and differential; Future    Amylase; Future    Lipase; Future    XR abdomen obstruction series; Future    FL barium swallow ROUTINE esophagus; Future    US abdomen complete; Future    omeprazole (PriLOSEC) 20 mg delayed release capsule; Take 1 capsule (20 mg total) by mouth daily before breakfast

## 2024-09-19 NOTE — ASSESSMENT & PLAN NOTE
A/P: Doing ok except for new onset n/v. ??Cause. PE unimpressive for causes. Did have a gastric bypass and notes trouble swallowing as well. NO heartburn. Still has his GB. Will check labs, obs series, US and swallowing study. Start PPI. May need CT or more likely EGD. ?gastric emptying study.. Discussed vaccines Defers.. Continue current treatment and RTC one week for f/u.  Keep f/u with the specialists.

## 2024-09-19 NOTE — PROGRESS NOTES
Ambulatory Visit  Name: Gibran Calvillo      : 1960      MRN: [id number]  Encounter Provider: Omar Montes DO  Encounter Date: 2024   Encounter department: [facility]    Assessment & Plan  Essential hypertension    Orders:    Comprehensive metabolic panel; Future    Obstructive sleep apnea    Orders:    Comprehensive metabolic panel; Future    Malignant melanoma of skin (HCC)         Malignant neoplasm of prostate (HCC)         Generalized anxiety disorder         Mixed hyperlipidemia    Orders:    Comprehensive metabolic panel; Future    LDL cholesterol, direct; Future    Triglycerides; Future    Prediabetes    Orders:    Hemoglobin A1C; Future    Morbid (severe) obesity due to excess calories (HCC)       A/P: Doing ok except for new onset n/v. ??Cause. PE unimpressive for causes. Did have a gastric bypass and notes trouble swallowing as well. NO heartburn. Still has his GB. Will check labs, obs series, US and swallowing study. Start PPI. May need CT or more likely EGD. ?gastric emptying study.. Discussed vaccines Defers.. Continue current treatment and RTC one week for f/u.  Keep f/u with the specialists.   Nausea and vomiting, unspecified vomiting type    Orders:    CBC and differential; Future    Amylase; Future    Lipase; Future    XR abdomen obstruction series; Future    FL barium swallow ROUTINE esophagus; Future    US abdomen complete; Future    omeprazole (PriLOSEC) 20 mg delayed release capsule; Take 1 capsule (20 mg total) by mouth daily before breakfast    S/P gastric bypass    Orders:    CBC and differential; Future    Amylase; Future    Lipase; Future    XR abdomen obstruction series; Future    FL barium swallow ROUTINE esophagus; Future    US abdomen complete; Future    omeprazole (PriLOSEC) 20 mg delayed release capsule; Take 1 capsule (20 mg total) by mouth daily before breakfast    Chronic diarrhea    Orders:    CBC and differential; Future    Amylase; Future     Lipase; Future    XR abdomen obstruction series; Future    FL barium swallow ROUTINE esophagus; Future    US abdomen complete; Future    omeprazole (PriLOSEC) 20 mg delayed release capsule; Take 1 capsule (20 mg total) by mouth daily before breakfast    Dysphagia, unspecified type    Orders:    CBC and differential; Future    Amylase; Future    Lipase; Future    XR abdomen obstruction series; Future    FL barium swallow ROUTINE esophagus; Future    US abdomen complete; Future    omeprazole (PriLOSEC) 20 mg delayed release capsule; Take 1 capsule (20 mg total) by mouth daily before breakfast       History of Present Illness     WM RTC for f/u HTN, HLD, etc. Doing ok, but reports three weeks of daily n/v. Chronic diarrhea. Worse with food. Notes food gets stuck as well, but only solids. NO fever or chills. No new meds or dietary changes.  No changes in stools. No pain except during vomting. No heartburn. NO ETOH or NSAID use. H/o gastric bypass. No change in weight. No change with position. .  Remains active w/o difficulty and no falls. ELIDIA and chronic pain are manageable. BRITTANI is controlled. Melanoma and prostate ca's are in remission. Due for labs and vaccines.           Review of Systems   Constitutional:  Negative for activity change, chills, diaphoresis, fatigue and fever.   HENT:  Positive for trouble swallowing.    Eyes:  Negative for visual disturbance.   Respiratory:  Negative for cough, chest tightness, shortness of breath and wheezing.    Cardiovascular:  Negative for chest pain, palpitations and leg swelling.   Gastrointestinal:  Positive for diarrhea, nausea and vomiting. Negative for abdominal distention, abdominal pain, anal bleeding, blood in stool, constipation and rectal pain.   Endocrine: Negative for cold intolerance and heat intolerance.   Genitourinary:  Negative for difficulty urinating, dysuria and frequency.   Musculoskeletal:  Negative for arthralgias, gait problem and myalgias.  "  Neurological:  Negative for dizziness, seizures, syncope, weakness, light-headedness and headaches.   Psychiatric/Behavioral:  Negative for confusion, dysphoric mood and sleep disturbance. The patient is not nervous/anxious.            Objective     /86   Pulse 57   Temp 97.9 °F (36.6 °C)   Ht 5' 8\" (1.727 m)   Wt (!) 164 kg (360 lb 12.8 oz)   SpO2 96%   BMI 54.86 kg/m²     Physical Exam  Vitals and nursing note reviewed.   Constitutional:       General: He is not in acute distress.     Appearance: Normal appearance. He is obese. He is not ill-appearing.   HENT:      Head: Normocephalic and atraumatic.      Mouth/Throat:      Mouth: Mucous membranes are moist.   Eyes:      Extraocular Movements: Extraocular movements intact.      Conjunctiva/sclera: Conjunctivae normal.      Pupils: Pupils are equal, round, and reactive to light.   Neck:      Vascular: No carotid bruit.   Cardiovascular:      Rate and Rhythm: Normal rate and regular rhythm.      Heart sounds: Normal heart sounds. No murmur heard.  Pulmonary:      Effort: Pulmonary effort is normal. No respiratory distress.      Breath sounds: No wheezing, rhonchi or rales.   Abdominal:      General: Bowel sounds are normal. There is no distension.      Palpations: Abdomen is soft.      Tenderness: There is no abdominal tenderness.   Musculoskeletal:      Cervical back: Neck supple.      Right lower leg: No edema.      Left lower leg: No edema.   Neurological:      General: No focal deficit present.      Mental Status: He is alert and oriented to person, place, and time. Mental status is at baseline.   Psychiatric:         Mood and Affect: Mood normal.         Behavior: Behavior normal.         Thought Content: Thought content normal.         Judgment: Judgment normal.         "

## 2024-09-19 NOTE — PATIENT INSTRUCTIONS
"Patient Education     High blood pressure in adults   The Basics   Written by the doctors and editors at [facility]   What is high blood pressure? -- High blood pressure is a condition that puts you at risk for heart attack, stroke, and kidney disease. It does not usually cause symptoms. But it can be serious.  When your doctor or nurse tells you your blood pressure, they say 2 numbers. For instance, your doctor or nurse might say that your blood pressure is \"130 over 80.\" The top number is the pressure inside your arteries when your heart is kay. The bottom number is the pressure inside your arteries when your heart is relaxed.  \"Elevated blood pressure\" is a term doctors or nurses use as a warning. People with elevated blood pressure do not yet have high blood pressure. But their blood pressure is not as low as it should be for good health.  Many experts define high, elevated, and normal blood pressure as follows:   High - Top number of 130 or above and/or bottom number of 80 or above.   Elevated - Top number between 120 and 129 and bottom number of 79 or below.   Normal - Top number of 119 or below and bottom number of 79 or below.  This information is also in the table (table 1).  How can I lower my blood pressure? -- If your doctor or nurse prescribed blood pressure medicine, the most important thing you can do is to take it. If it causes side effects, do not just stop taking it. Instead, talk to your doctor or nurse about the problems it causes. They might be able to lower your dose or switch you to another medicine. If cost is a problem, mention that, too. They might be able to put you on a less expensive medicine. Taking your blood pressure medicine can keep you from having a heart attack or stroke, and it can save your life!  Can I do anything on my own? -- You have a lot of control over your blood pressure. To lower it:   Lose weight (if you are overweight).   Choose a diet low in fat and rich in " "fruits, vegetables, and low-fat dairy products.   Eat less salt.   Do something active for at least 30 minutes a day on most days of the week.   Drink less alcohol (if you drink more than 2 alcoholic drinks per day).  It's also a good idea to get a home blood pressure meter. People who check their own blood pressure at home do better at keeping it low and can sometimes even reduce the amount of medicine they take.  All topics are updated as new evidence becomes available and our peer review process is complete.  This topic retrieved from [employer] on: Feb 26, 2024.  Topic 88633 Version 23.0  Release: 32.2.4 - C32.56  © 2024 UpToDate, Inc. and/or its affiliates. All rights reserved.  table 1: Definition of normal and high blood pressure  Level  Top number  Bottom number    High 130 or above 80 or above   Elevated 120 to 129 79 or below   Normal 119 or below 79 or below   These definitions are from the American College of Cardiology/American Heart Association. Other expert groups might use slightly different definitions.  \"Elevated blood pressure\" is a term doctor or nurses use as a warning. It means you do not yet have high blood pressure, but your blood pressure is not as low as it should be for good health.  Graphic 18022 Version 6.0  Consumer Information Use and Disclaimer   Disclaimer: This generalized information is a limited summary of diagnosis, treatment, and/or medication information. It is not meant to be comprehensive and should be used as a tool to help the user understand and/or assess potential diagnostic and treatment options. It does NOT include all information about conditions, treatments, medications, side effects, or risks that may apply to a specific patient. It is not intended to be medical advice or a substitute for the medical advice, diagnosis, or treatment of a health care provider based on the health care provider's examination and assessment of a patient's specific and unique circumstances. " Patients must speak with a health care provider for complete information about their health, medical questions, and treatment options, including any risks or benefits regarding use of medications. This information does not endorse any treatments or medications as safe, effective, or approved for treating a specific patient. UpToDate, Inc. and its affiliates disclaim any warranty or liability relating to this information or the use thereof.The use of this information is governed by the Terms of Use, available at https://www.woltersMomentCamuwer.com/en/know/clinical-effectiveness-terms. 2024© UpToDate, Inc. and its affiliates and/or licensors. All rights reserved.  Copyright   © 2024 UpToDate, Inc. and/or its affiliates. All rights reserved.

## 2024-09-25 ENCOUNTER — HOSPITAL ENCOUNTER (OUTPATIENT)
Dept: ULTRASOUND IMAGING | Facility: HOSPITAL | Age: 64
Discharge: HOME | End: 2024-09-25
Attending: INTERNAL MEDICINE
Payer: COMMERCIAL

## 2024-09-25 DIAGNOSIS — R11.2 NAUSEA AND VOMITING, UNSPECIFIED VOMITING TYPE: ICD-10-CM

## 2024-09-25 DIAGNOSIS — K52.9 CHRONIC DIARRHEA: ICD-10-CM

## 2024-09-25 DIAGNOSIS — Z98.84 S/P GASTRIC BYPASS: ICD-10-CM

## 2024-09-25 DIAGNOSIS — R13.10 DYSPHAGIA, UNSPECIFIED TYPE: ICD-10-CM

## 2024-09-25 PROCEDURE — 76700 US EXAM ABDOM COMPLETE: CPT

## 2024-09-26 ENCOUNTER — HOSPITAL ENCOUNTER (OUTPATIENT)
Dept: RADIOLOGY | Facility: HOSPITAL | Age: 64
End: 2024-09-26
Attending: INTERNAL MEDICINE
Payer: COMMERCIAL

## 2024-09-26 DIAGNOSIS — R11.2 NAUSEA AND VOMITING, UNSPECIFIED VOMITING TYPE: ICD-10-CM

## 2024-09-26 DIAGNOSIS — Z98.84 S/P GASTRIC BYPASS: ICD-10-CM

## 2024-09-26 DIAGNOSIS — K52.9 CHRONIC DIARRHEA: ICD-10-CM

## 2024-09-26 DIAGNOSIS — R13.10 DYSPHAGIA, UNSPECIFIED TYPE: ICD-10-CM

## 2024-09-26 PROCEDURE — 74220 X-RAY XM ESOPHAGUS 1CNTRST: CPT

## 2024-09-27 ENCOUNTER — OFFICE VISIT (OUTPATIENT)
Dept: INTERNAL MEDICINE CLINIC | Facility: CLINIC | Age: 64
End: 2024-09-27
Payer: COMMERCIAL

## 2024-09-27 ENCOUNTER — TELEPHONE (OUTPATIENT)
Dept: INTERNAL MEDICINE CLINIC | Facility: CLINIC | Age: 64
End: 2024-09-27

## 2024-09-27 VITALS
OXYGEN SATURATION: 96 % | SYSTOLIC BLOOD PRESSURE: 126 MMHG | HEIGHT: 68 IN | HEART RATE: 57 BPM | DIASTOLIC BLOOD PRESSURE: 86 MMHG | BODY MASS INDEX: 47.74 KG/M2 | WEIGHT: 315 LBS | TEMPERATURE: 97.5 F

## 2024-09-27 DIAGNOSIS — R11.2 NAUSEA AND VOMITING, UNSPECIFIED VOMITING TYPE: Primary | ICD-10-CM

## 2024-09-27 DIAGNOSIS — K21.9 GASTROESOPHAGEAL REFLUX DISEASE WITHOUT ESOPHAGITIS: ICD-10-CM

## 2024-09-27 DIAGNOSIS — R13.10 DYSPHAGIA, UNSPECIFIED TYPE: ICD-10-CM

## 2024-09-27 DIAGNOSIS — K44.9 HIATAL HERNIA: ICD-10-CM

## 2024-09-27 DIAGNOSIS — K80.80 BILIARY CALCULUS OF OTHER SITE WITHOUT OBSTRUCTION: ICD-10-CM

## 2024-09-27 PROBLEM — K80.20 CHOLELITHIASIS: Status: ACTIVE | Noted: 2024-09-27

## 2024-09-27 PROCEDURE — 99213 OFFICE O/P EST LOW 20 MIN: CPT | Performed by: INTERNAL MEDICINE

## 2024-09-27 NOTE — TELEPHONE ENCOUNTER
----- Message from Omar Montes DO sent at 9/27/2024 11:12 AM EDT -----  Call pt, US with gallstones and probable blockage INTERMITTENTLY of the duct. Recommend seeing sx. If agreeable. Refer to Dr. Levi POTTER. K

## 2024-09-27 NOTE — ASSESSMENT & PLAN NOTE
A/P: Stable and discussed labs and imaging. Given gallstones on OBS, ?cause. Await US results. May need HIDA. ?HH contributing as well. May need to see GI and possible EGD. Continue current treatment and RTC one month for routine and f/u.

## 2024-09-27 NOTE — PROGRESS NOTES
Ambulatory Visit  Name: Gibran Calvillo      : 1960      MRN: [id number]  Encounter Provider: Omar Montes DO  Encounter Date: 2024   Encounter department: [facility]    Assessment & Plan  Nausea and vomiting, unspecified vomiting type         Dysphagia, unspecified type         Hiatal hernia         Biliary calculus of other site without obstruction       A/P: Stable and discussed labs and imaging. Given gallstones on OBS, ?cause. Await US results. May need HIDA. ?HH contributing as well. May need to see GI and possible EGD. Continue current treatment and RTC one month for routine and f/u.   Gastroesophageal reflux disease without esophagitis            History of Present Illness     WM RTC for a one week f/u chronic n/v and difficulty swallowing. Pt s/p gastric bypass. Labs ok  and swallowing study normal with HH. OBS series normal, but GB stones noted. US results pending. Started on a PPI. Reports doing a lot better. Only vomited once. PPI seems to be helping. .           Review of Systems   Constitutional:  Negative for activity change, chills, diaphoresis, fatigue and fever.   Respiratory:  Negative for cough, chest tightness, shortness of breath and wheezing.    Cardiovascular:  Negative for chest pain, palpitations and leg swelling.   Gastrointestinal:  Negative for abdominal pain, constipation, diarrhea, nausea and vomiting.   Genitourinary:  Negative for difficulty urinating, dysuria and frequency.   Musculoskeletal:  Negative for arthralgias, gait problem and myalgias.   Neurological:  Negative for dizziness, seizures, syncope, weakness, light-headedness and headaches.   Psychiatric/Behavioral:  Negative for confusion, dysphoric mood and sleep disturbance. The patient is not nervous/anxious.            Objective     There were no vitals taken for this visit.    Physical Exam  Vitals and nursing note reviewed.   Constitutional:       General: He is not in acute distress.      Appearance: Normal appearance. He is not ill-appearing.   HENT:      Head: Normocephalic and atraumatic.      Mouth/Throat:      Mouth: Mucous membranes are moist.   Eyes:      Extraocular Movements: Extraocular movements intact.      Conjunctiva/sclera: Conjunctivae normal.      Pupils: Pupils are equal, round, and reactive to light.   Cardiovascular:      Rate and Rhythm: Normal rate and regular rhythm.      Heart sounds: Normal heart sounds. No murmur heard.  Pulmonary:      Effort: Pulmonary effort is normal. No respiratory distress.      Breath sounds: Normal breath sounds. No wheezing, rhonchi or rales.   Abdominal:      General: Bowel sounds are normal. There is no distension.      Palpations: Abdomen is soft.      Tenderness: There is no abdominal tenderness.   Neurological:      General: No focal deficit present.      Mental Status: He is alert and oriented to person, place, and time. Mental status is at baseline.   Psychiatric:         Mood and Affect: Mood normal.         Behavior: Behavior normal.         Thought Content: Thought content normal.         Judgment: Judgment normal.

## 2024-10-16 ENCOUNTER — CLINICAL SUPPORT (OUTPATIENT)
Dept: INTERNAL MEDICINE CLINIC | Facility: CLINIC | Age: 64
End: 2024-10-16
Payer: COMMERCIAL

## 2024-10-16 DIAGNOSIS — Z11.1 TUBERCULOSIS SCREENING: Primary | ICD-10-CM

## 2024-10-16 PROCEDURE — 86580 TB INTRADERMAL TEST: CPT

## 2024-10-21 ENCOUNTER — CONSULT (OUTPATIENT)
Dept: SURGERY | Facility: CLINIC | Age: 64
End: 2024-10-21
Payer: COMMERCIAL

## 2024-10-21 VITALS
TEMPERATURE: 96.6 F | WEIGHT: 315 LBS | OXYGEN SATURATION: 99 % | BODY MASS INDEX: 46.65 KG/M2 | HEIGHT: 69 IN | DIASTOLIC BLOOD PRESSURE: 84 MMHG | RESPIRATION RATE: 18 BRPM | HEART RATE: 67 BPM | SYSTOLIC BLOOD PRESSURE: 138 MMHG

## 2024-10-21 DIAGNOSIS — K44.9 HIATAL HERNIA: ICD-10-CM

## 2024-10-21 DIAGNOSIS — K80.20 GALLSTONES: ICD-10-CM

## 2024-10-21 DIAGNOSIS — Z98.84 S/P LAPAROSCOPIC SLEEVE GASTRECTOMY: ICD-10-CM

## 2024-10-21 DIAGNOSIS — R11.14 BILIOUS VOMITING WITH NAUSEA: Primary | ICD-10-CM

## 2024-10-21 PROCEDURE — 99204 OFFICE O/P NEW MOD 45 MIN: CPT | Performed by: SURGERY

## 2024-10-21 RX ORDER — ONDANSETRON 4 MG/1
4 TABLET, FILM COATED ORAL EVERY 8 HOURS PRN
Qty: 30 TABLET | Refills: 1 | Status: SHIPPED | OUTPATIENT
Start: 2024-10-21

## 2024-10-21 NOTE — PROGRESS NOTES
Consult - General Surgery   Gibran Calvillo 64 y.o. male MRN: [id number]  Encounter: [id number]    Assessment & Plan     64M morbid obesity BMI 53 s/p lap sleeve gastrectomy in 2019 at [facility], prior prostate cancer s/p prostatectomy, melanoma s/p wide excision, previous BL LE DVT on long term aspirin, here today to discuss 4 months of persistent episode nausea and vomiting, some difficulty swallowing, without associated abdominal pain, symptoms present at random, not only post prandial in nature.    He has a known hiatal hernia seen on recent esophagram/barium swallow. No recent EGD. US showed gallstones without inflammatory change, normal LFTs. Of note, his symptoms have improved on daily PPI therapy since starting that although he still has intermittent nausea/vomiting, at a lower frequency than prior.     I have advised an expedited GI evaluation, consideration for EGD, rule out H.Pylori, gastritis. He may be symptomatic from an upper GI cause related or unrelated to his prior sleeve, possible symptomatic hiatal hernia. I explained more to the patient about symptomatic hiatal hernias and their surgical management. I do not think he has biliary colic or a need to focus on his gallstones at this time. I told him that as he is without abdominal pain or food related symptoms, it is much less likely for him to have a biliary cause of his symptoms than an upper GI/hiatal hernia cause and we will ask GI to see him and pursue that workup. I showed him the UGI images and his hiatal hernia.    If needed, we will refer him to Dr. Tim Frey to be evaluated for hiatal hernia repair, pending GI workup.    I did explain to the patient the more rare biliary dyskinesia diagnosis that can sometimes present with dyspepsia, nausea, vomiting, more atypical symptoms. This is a diagnosis of exclusion and usually found in the absence of gallstones. If the upper GI workup is negative and we do not think as a multidisciplinary group that  his hiatal hernia is contributing, we can consider an outpatient HIDA with [facility] to evaluate for functional gallbladder disease.     He will continue daily PPI, I have added PRN zofran. We have been able to schedule him with GI for tomorrow.     I did recommend that he reestablish care with weight management/bariatric medicine for ongoing support and management. He would like to focus on his current symptoms first but  will let us know after that if he needs assistance getting in to weight management.     History of Present Illness   Chief Complaint   Patient presents with    Consult     Prior prostate cancer and melanoma treated surgically. Prior BL DVT after ortho surgery, no on ASA maintenance. Prior lap sleeve 2019 lost 125 pounds, gained some back, lost to follow up at [facility].    Since June he has noticed nearly daily nausea, occasional vomiting. Was daily, now less often. Not provoked by food, can happen at any time. No abdominal pain. Barium swallow showed a moderate sized hiatal hernia. Denies reflux or heartburn. Has been on PPI from PCP since seen for these symptoms, notes the PPI helped his symptoms greatly, decreased severity and frequency of symptoms but they are still present. Also mentions some difficulty swallowing.     Outpatient US showed gallstones, no inflammation or obstruction, CBD 7mm, no CBD stones.    No recent EGD. Had colonoscopy 2023, due in 2028, history of polyps. Does not follow routinely with GI.          Review of Systems   Constitutional: Negative.    HENT:  Positive for trouble swallowing.    Gastrointestinal:  Positive for nausea and vomiting. Negative for abdominal pain, constipation and diarrhea.   All other systems reviewed and are negative.      Historical Information   Past Medical History:   Diagnosis Date    Arthritis     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HCC)     Cancer (HCC)     of back    COVID-19     DVT (deep venous thrombosis) (HCC)     post TKR    Generalized anxiety disorder      Hypertension     Melanoma (HCC)     Mobitz (type) I (Wenckebach's) atrioventricular block     Obesity     Primary osteoarthritis of both knees     Sleep apnea      Past Surgical History:   Procedure Laterality Date    ABDOMINAL SURGERY      COLONOSCOPY      GASTRECTOMY SLEEVE LAPAROSCOPIC  2019    JOINT REPLACEMENT Bilateral     KNEE ARTHROSCOPY      Therapeutic; Last Assessed: 10/30/2015    KNEE SURGERY Bilateral 2006, 2008    Knee replacement    MOHS SURGERY      [facility]s Micrograph[facility] Surg Trunk First Stage, up to 5 Specimens    TONSILLECTOMY AND ADENOIDECTOMY      UVULECTOMY      Last Assessed: 10/30/2015     Social History   Social History     Substance and Sexual Activity   Alcohol Use Not Currently    Comment: rarely; 1 x year     Social History     Substance and Sexual Activity   Drug Use No     Social History     Tobacco Use   Smoking Status Never    Passive exposure: Never   Smokeless Tobacco Never     Family History   Problem Relation Age of Onset    Hypotension Mother     Cancer Mother     Thyroid cancer Mother     Stroke Mother         of unknown cause    Heart disease Father         cardiac disorder    Other Brother         Brain tumor    Arthritis Maternal Grandmother     Stroke Maternal Grandmother         of unknown cause    Heart disease Maternal Grandmother         cardiac disorder    Stroke Maternal Grandfather         of unknown cause    Heart disease Paternal Grandmother         cardiac disorder    Liver cancer Paternal Grandfather     Liver cancer Other     Heart disease Other         cardiac disorder    Lung cancer Other     Prostate cancer Other     Prostate cancer Family        Meds/Allergies     Current Outpatient Medications:     aspirin (ECOTRIN LOW STRENGTH) 81 mg EC tablet, Take 81 mg by mouth daily, Disp: , Rfl:     escitalopram (LEXAPRO) 20 mg tablet, take 1 tablet by mouth once daily, Disp: 90 tablet, Rfl: 1    ketoconazole (NIZORAL) 2 % shampoo,  , Disp: , Rfl:     lisinopril  "(ZESTRIL) 10 mg tablet, take 1 tablet by mouth once daily, Disp: 90 tablet, Rfl: 1    Multiple Vitamins-Minerals (multivitamin with minerals) tablet, Take 1 tablet by mouth daily, Disp: , Rfl:     omeprazole (PriLOSEC) 20 mg delayed release capsule, Take 1 capsule (20 mg total) by mouth daily before breakfast, Disp: 90 capsule, Rfl: 3    ondansetron (ZOFRAN) 4 mg tablet, Take 1 tablet (4 mg total) by mouth every 8 (eight) hours as needed for nausea or vomiting, Disp: 30 tablet, Rfl: 1    sildenafil (VIAGRA) 100 mg tablet, Take 1 tablet (100 mg total) by mouth daily as needed for erectile dysfunction, Disp: 6 tablet, Rfl: 0    zolpidem (AMBIEN) 10 mg tablet, Take 1 tablet (10 mg total) by mouth daily at bedtime as needed for sleep, Disp: 30 tablet, Rfl: 2    Calcium Carbonate-Vitamin D 600-400 MG-UNIT per tablet, Take 1 tablet by mouth, Disp: , Rfl:     Naltrexone-buPROPion HCl ER 8-90 MG TB12, One po q AM x 7 and then one po bid x 7 and then two in the AM and one in the PM x 7, then two po bid. (Patient not taking: Reported on 9/27/2024), Disp: 120 tablet, Rfl: 2    traZODone (DESYREL) 50 mg tablet, take 1 tablet by mouth once daily at bedtime (Patient not taking: Reported on 10/21/2024), Disp: 30 tablet, Rfl: 3  Allergies   Allergen Reactions    Penicillin V Anaphylaxis    Penicillin G Other (See Comments)       The following portions of the patient's history were reviewed and updated as appropriate: allergies, current medications, past family history, past medical history, past social history, past surgical history, and problem list.    Objective   Current Vitals:   Blood pressure 138/84, pulse 67, temperature (!) 96.6 °F (35.9 °C), temperature source Temporal, resp. rate 18, height 5' 9\" (1.753 m), weight (!) 162 kg (356 lb 12.8 oz), SpO2 99%.    Physical Exam  Vitals reviewed.   Constitutional:       General: He is not in acute distress.     Appearance: He is obese. He is not ill-appearing, toxic-appearing or " diaphoretic.   HENT:      Head: Normocephalic.      Nose: No congestion.      Mouth/Throat:      Mouth: Mucous membranes are moist.   Eyes:      Pupils: Pupils are equal, round, and reactive to light.   Cardiovascular:      Rate and Rhythm: Normal rate.   Pulmonary:      Effort: Pulmonary effort is normal.   Abdominal:      General: There is no distension.      Palpations: Abdomen is soft. There is no mass.      Tenderness: There is no abdominal tenderness. There is no guarding or rebound.      Comments: Incisions well healed   Musculoskeletal:         General: Normal range of motion.      Cervical back: Normal range of motion.   Skin:     General: Skin is warm.      Capillary Refill: Capillary refill takes less than 2 seconds.      Coloration: Skin is not jaundiced.   Neurological:      General: No focal deficit present.      Mental Status: He is alert.   Psychiatric:         Mood and Affect: Mood normal.         Signature:  Kain Adams MD  Date: 10/21/2024 Time: 12:28 PM

## 2024-10-22 ENCOUNTER — CLINICAL SUPPORT (OUTPATIENT)
Dept: INTERNAL MEDICINE CLINIC | Facility: CLINIC | Age: 64
End: 2024-10-22
Payer: COMMERCIAL

## 2024-10-22 ENCOUNTER — OFFICE VISIT (OUTPATIENT)
Age: 64
End: 2024-10-22
Payer: COMMERCIAL

## 2024-10-22 VITALS — TEMPERATURE: 97.7 F | OXYGEN SATURATION: 99 % | WEIGHT: 315 LBS | HEIGHT: 69 IN | BODY MASS INDEX: 46.65 KG/M2

## 2024-10-22 DIAGNOSIS — K21.9 GASTROESOPHAGEAL REFLUX DISEASE WITHOUT ESOPHAGITIS: ICD-10-CM

## 2024-10-22 DIAGNOSIS — Z98.84 S/P LAPAROSCOPIC SLEEVE GASTRECTOMY: ICD-10-CM

## 2024-10-22 DIAGNOSIS — R11.14 BILIOUS VOMITING WITH NAUSEA: Primary | ICD-10-CM

## 2024-10-22 DIAGNOSIS — K59.1 FUNCTIONAL DIARRHEA: ICD-10-CM

## 2024-10-22 DIAGNOSIS — Z11.1 SCREENING FOR TUBERCULOSIS: Primary | ICD-10-CM

## 2024-10-22 PROCEDURE — 99204 OFFICE O/P NEW MOD 45 MIN: CPT | Performed by: STUDENT IN AN ORGANIZED HEALTH CARE EDUCATION/TRAINING PROGRAM

## 2024-10-22 PROCEDURE — 86580 TB INTRADERMAL TEST: CPT

## 2024-10-22 NOTE — ASSESSMENT & PLAN NOTE
Medication reconciliation completed.  Reviewed Medication list and confirmed med allergies with patient; confirmed with Dr. First Medbennett.  The cause for this remains unclear.  While he could certainly be having some degree of reflux given his history of sleeve gastrectomy, this should not cause overt nausea or vomiting.  There could be an underlying motility disorder contributing.  Low suspicion for gastroparesis or biliary pathology.  May be related to the hiatal hernia which was evident on imaging.  Fortunately, he does seem to respond to omeprazole and states that the nausea/vomiting has improved.    Recommend EGD for evaluation  He should continue omeprazole 20 mg daily  He may take Zofran which was prescribed by his surgeon as needed for any nausea or vomiting    I obtained informed consent from the patient.  The risks/benefits/alternatives of the procedure were discussed with the patient.  Risks included, but not limited to, infection, bleeding, perforation, injury to organs in the abdomen, missed lesion and incomplete procedure were discussed.  Patient was agreeable and electronic signature was obtained.     Orders:    EGD; Future

## 2024-10-22 NOTE — PATIENT INSTRUCTIONS
We will schedule you for upper endoscopy  Please continue the omeprazole 20 mg daily  Recommend adding dietary fiber  You can use over-the-counter Metamucil or Benefiber or fiber gummies

## 2024-10-22 NOTE — PROGRESS NOTES
Ambulatory Visit  Name: Gibran Calvillo      : 1960      MRN: [id number]  Encounter Provider: Francisco Javier Moraes DO  Encounter Date: 10/22/2024   Encounter department: [facility]    Assessment & Plan  Bilious vomiting with nausea  The cause for this remains unclear.  While he could certainly be having some degree of reflux given his history of sleeve gastrectomy, this should not cause overt nausea or vomiting.  There could be an underlying motility disorder contributing.  Low suspicion for gastroparesis or biliary pathology.  May be related to the hiatal hernia which was evident on imaging.  Fortunately, he does seem to respond to omeprazole and states that the nausea/vomiting has improved.    Recommend EGD for evaluation  He should continue omeprazole 20 mg daily  He may take Zofran which was prescribed by his surgeon as needed for any nausea or vomiting    I obtained informed consent from the patient.  The risks/benefits/alternatives of the procedure were discussed with the patient.  Risks included, but not limited to, infection, bleeding, perforation, injury to organs in the abdomen, missed lesion and incomplete procedure were discussed.  Patient was agreeable and electronic signature was obtained.     Orders:    EGD; Future    S/P laparoscopic sleeve gastrectomy  Status post sleeve gastrectomy in 2019.  There is evidence of hiatal hernia which may be contributing to reflux and possibly has nausea and vomiting as well.  Fortunately, appears that he has responded well to omeprazole.  He is at increased risk for White's esophagus in the setting of prior sleeve gastrectomy as well as a hiatal hernia.    Plan for upper endoscopy for evaluation as noted above  Continue omeprazole 20 mg daily    Orders:    EGD; Future    Gastroesophageal reflux disease without esophagitis  Likely exacerbated by hiatal hernia as well as sleeve gastrectomy with loss of accommodation.    Plan  for EGD  Continue omeprazole as noted above  Encouraged weight loss which may also help with symptoms  May require hiatal hernia repair depending on endoscopy findings    Orders:    EGD; Future    Functional diarrhea  Reports long history of loose or liquid stools.  Cause for this remains unclear.  Possibly related to IBS with diarrhea versus lower suspicion for microscopic colitis.  He had colonoscopy a little over a year ago and is not yet due for repeat.  That colonoscopy report was reviewed.  Low suspicion for celiac disease, EPI, or IBD.    I have recommended trial of fiber supplementation  We will plan on obtaining duodenal biopsies at time of endoscopy  We will hold off on stool studies for now given the chronicity of his diarrhea but may consider this as well as repeating colonoscopy if diarrhea worsens or persists despite conservative measures           History of Present Illness     Gibran Calvillo is a 64 y.o. male who presents to GI office for evaluation of nausea/vomiting and reflux in the setting of prior sleeve gastrectomy.  Patient has been referred by his surgeon.  Patient reports that over the last 4 months or so, he has been having significant nausea/vomiting and some dysphagia symptoms without associated abdominal pain.  His symptoms appear to be random although he does report that since starting omeprazole, his nausea/vomiting has improved.  He has seen general surgery for evaluation and there is an overall low suspicion for symptomatic gallstone disease.  He does have a known hiatal hernia which was seen on recent esophagram and barium swallow study.  There did not appear to be any significant evidence of reflux on that examination.  Weight remains relatively stable.    He also reports having diarrhea which appears to be chronic.  He reports having 3-4 loose or liquid bowel movements per day.  He denies any associated abdominal pain with this as well.  Denies any urgency.  No hematochezia or  melena.  He underwent colonoscopy a little over a year ago.  Notable for some diverticulosis and a single subcentimeter polyp which was removed.  No other significant abnormalities were noted.  The bowel habits appear to be chronic in nature and does not seem to be affecting his overall quality of life much.    History obtained from : patient  Review of Systems   Constitutional:  Negative for activity change and appetite change.   Respiratory:  Negative for shortness of breath.    Cardiovascular:  Negative for chest pain.   Gastrointestinal:  Positive for diarrhea, nausea and vomiting. Negative for abdominal distention, abdominal pain, blood in stool, constipation and rectal pain.   Skin:  Negative for color change, rash and wound.     Medical History Reviewed by provider this encounter:       Current Outpatient Medications on File Prior to Visit   Medication Sig Dispense Refill    aspirin (ECOTRIN LOW STRENGTH) 81 mg EC tablet Take 81 mg by mouth daily      Calcium Carbonate-Vitamin D 600-400 MG-UNIT per tablet Take 1 tablet by mouth      escitalopram (LEXAPRO) 20 mg tablet take 1 tablet by mouth once daily 90 tablet 1    ketoconazole (NIZORAL) 2 % shampoo        lisinopril (ZESTRIL) 10 mg tablet take 1 tablet by mouth once daily 90 tablet 1    Multiple Vitamins-Minerals (multivitamin with minerals) tablet Take 1 tablet by mouth daily      omeprazole (PriLOSEC) 20 mg delayed release capsule Take 1 capsule (20 mg total) by mouth daily before breakfast 90 capsule 3    ondansetron (ZOFRAN) 4 mg tablet Take 1 tablet (4 mg total) by mouth every 8 (eight) hours as needed for nausea or vomiting 30 tablet 1    sildenafil (VIAGRA) 100 mg tablet Take 1 tablet (100 mg total) by mouth daily as needed for erectile dysfunction 6 tablet 0    zolpidem (AMBIEN) 10 mg tablet Take 1 tablet (10 mg total) by mouth daily at bedtime as needed for sleep 30 tablet 2    Naltrexone-buPROPion HCl ER 8-90 MG TB12 One po q AM x 7 and then one  "po bid x 7 and then two in the AM and one in the PM x 7, then two po bid. (Patient not taking: Reported on 9/27/2024) 120 tablet 2    traZODone (DESYREL) 50 mg tablet take 1 tablet by mouth once daily at bedtime (Patient not taking: Reported on 10/21/2024) 30 tablet 3     No current facility-administered medications on file prior to visit.      Social History     Tobacco Use    Smoking status: Never     Passive exposure: Never    Smokeless tobacco: Never   Vaping Use    Vaping status: Never Used   Substance and Sexual Activity    Alcohol use: Not Currently     Comment: rarely; 1 x year    Drug use: No    Sexual activity: Yes     Partners: Female         Objective     Temp 97.7 °F (36.5 °C) (Temporal)   Ht 5' 9\" (1.753 m)   Wt (!) 161 kg (356 lb)   SpO2 99%   BMI 52.57 kg/m²     Physical Exam  Vitals reviewed.   Constitutional:       Appearance: Normal appearance.   HENT:      Head: Normocephalic and atraumatic.      Nose: Nose normal.   Eyes:      Extraocular Movements: Extraocular movements intact.   Cardiovascular:      Rate and Rhythm: Normal rate and regular rhythm.   Pulmonary:      Effort: Pulmonary effort is normal.      Breath sounds: Normal breath sounds.   Abdominal:      General: Bowel sounds are normal. There is no distension.      Palpations: Abdomen is soft. There is no mass.      Tenderness: There is no abdominal tenderness. There is no guarding or rebound.   Neurological:      Mental Status: He is alert. Mental status is at baseline.   Psychiatric:         Mood and Affect: Mood normal.         Behavior: Behavior normal.       Administrative Statements   Topics discussed with the patient / family include symptom assessment and management, medication review, and medication adjustment.  "

## 2024-10-22 NOTE — ASSESSMENT & PLAN NOTE
Status post sleeve gastrectomy in 2019.  There is evidence of hiatal hernia which may be contributing to reflux and possibly has nausea and vomiting as well.  Fortunately, appears that he has responded well to omeprazole.  He is at increased risk for White's esophagus in the setting of prior sleeve gastrectomy as well as a hiatal hernia.    Plan for upper endoscopy for evaluation as noted above  Continue omeprazole 20 mg daily    Orders:    EGD; Future

## 2024-10-22 NOTE — ASSESSMENT & PLAN NOTE
Likely exacerbated by hiatal hernia as well as sleeve gastrectomy with loss of accommodation.    Plan for EGD  Continue omeprazole as noted above  Encouraged weight loss which may also help with symptoms  May require hiatal hernia repair depending on endoscopy findings    Orders:    EGD; Future

## 2024-10-22 NOTE — ASSESSMENT & PLAN NOTE
Reports long history of loose or liquid stools.  Cause for this remains unclear.  Possibly related to IBS with diarrhea versus lower suspicion for microscopic colitis.  He had colonoscopy a little over a year ago and is not yet due for repeat.  That colonoscopy report was reviewed.  Low suspicion for celiac disease, EPI, or IBD.    I have recommended trial of fiber supplementation  We will plan on obtaining duodenal biopsies at time of endoscopy  We will hold off on stool studies for now given the chronicity of his diarrhea but may consider this as well as repeating colonoscopy if diarrhea worsens or persists despite conservative measures

## 2024-10-24 LAB
INDURATION: 0 MM
TB SKIN TEST: NEGATIVE

## 2024-10-30 ENCOUNTER — OFFICE VISIT (OUTPATIENT)
Dept: INTERNAL MEDICINE CLINIC | Facility: CLINIC | Age: 64
End: 2024-10-30
Payer: COMMERCIAL

## 2024-10-30 ENCOUNTER — APPOINTMENT (OUTPATIENT)
Dept: LAB | Facility: CLINIC | Age: 64
End: 2024-10-30
Payer: COMMERCIAL

## 2024-10-30 VITALS
TEMPERATURE: 97.5 F | DIASTOLIC BLOOD PRESSURE: 88 MMHG | WEIGHT: 315 LBS | OXYGEN SATURATION: 99 % | HEART RATE: 65 BPM | BODY MASS INDEX: 46.65 KG/M2 | SYSTOLIC BLOOD PRESSURE: 142 MMHG | HEIGHT: 69 IN

## 2024-10-30 DIAGNOSIS — Z12.5 SCREENING FOR PROSTATE CANCER: ICD-10-CM

## 2024-10-30 DIAGNOSIS — M54.50 ACUTE LEFT-SIDED LOW BACK PAIN WITHOUT SCIATICA: ICD-10-CM

## 2024-10-30 DIAGNOSIS — C43.9 MALIGNANT MELANOMA OF SKIN (HCC): ICD-10-CM

## 2024-10-30 DIAGNOSIS — R73.03 PREDIABETES: ICD-10-CM

## 2024-10-30 DIAGNOSIS — C61 MALIGNANT NEOPLASM OF PROSTATE (HCC): ICD-10-CM

## 2024-10-30 DIAGNOSIS — G47.33 OBSTRUCTIVE SLEEP APNEA: ICD-10-CM

## 2024-10-30 DIAGNOSIS — E66.01 MORBID (SEVERE) OBESITY DUE TO EXCESS CALORIES (HCC): ICD-10-CM

## 2024-10-30 DIAGNOSIS — K44.9 HIATAL HERNIA: ICD-10-CM

## 2024-10-30 DIAGNOSIS — E78.2 MIXED HYPERLIPIDEMIA: ICD-10-CM

## 2024-10-30 DIAGNOSIS — Z23 ENCOUNTER FOR IMMUNIZATION: ICD-10-CM

## 2024-10-30 DIAGNOSIS — F41.1 GENERALIZED ANXIETY DISORDER: ICD-10-CM

## 2024-10-30 DIAGNOSIS — K21.9 GASTROESOPHAGEAL REFLUX DISEASE WITHOUT ESOPHAGITIS: ICD-10-CM

## 2024-10-30 DIAGNOSIS — E55.9 VITAMIN D DEFICIENCY: ICD-10-CM

## 2024-10-30 DIAGNOSIS — K80.20 CALCULUS OF GALLBLADDER WITHOUT CHOLECYSTITIS WITHOUT OBSTRUCTION: ICD-10-CM

## 2024-10-30 DIAGNOSIS — I10 ESSENTIAL HYPERTENSION: Primary | ICD-10-CM

## 2024-10-30 LAB — PSA SERPL-MCNC: 0.07 NG/ML (ref 0–4)

## 2024-10-30 PROCEDURE — 99214 OFFICE O/P EST MOD 30 MIN: CPT | Performed by: INTERNAL MEDICINE

## 2024-10-30 PROCEDURE — G0103 PSA SCREENING: HCPCS

## 2024-10-30 PROCEDURE — 36415 COLL VENOUS BLD VENIPUNCTURE: CPT

## 2024-10-30 PROCEDURE — 90471 IMMUNIZATION ADMIN: CPT

## 2024-10-30 PROCEDURE — 90673 RIV3 VACCINE NO PRESERV IM: CPT

## 2024-10-30 RX ORDER — LIDOCAINE HYDROCHLORIDE 10 MG/ML
0.5 INJECTION, SOLUTION EPIDURAL; INFILTRATION; INTRACAUDAL; PERINEURAL ONCE AS NEEDED
Status: CANCELLED | OUTPATIENT
Start: 2024-10-30

## 2024-10-30 RX ORDER — SODIUM CHLORIDE, SODIUM LACTATE, POTASSIUM CHLORIDE, CALCIUM CHLORIDE 600; 310; 30; 20 MG/100ML; MG/100ML; MG/100ML; MG/100ML
125 INJECTION, SOLUTION INTRAVENOUS CONTINUOUS
Status: CANCELLED | OUTPATIENT
Start: 2024-10-30

## 2024-10-30 RX ORDER — METHOCARBAMOL 500 MG/1
500 TABLET, FILM COATED ORAL 4 TIMES DAILY
Qty: 90 TABLET | Refills: 0 | Status: SHIPPED | OUTPATIENT
Start: 2024-10-30

## 2024-10-30 NOTE — PROGRESS NOTES
Ambulatory Visit  Name: Gibran Calvillo      : 1960      MRN: [id number]  Encounter Provider: Omar Montes DO  Encounter Date: 10/30/2024   Encounter department: [facility]    Assessment & Plan  Encounter for immunization    Orders:    influenza vaccine, recombinant, PF, 0.5 mL IM (Flublok)    Hiatal hernia         Gastroesophageal reflux disease without esophagitis         Essential hypertension         Obstructive sleep apnea         Calculus of gallbladder without cholecystitis without obstruction         Malignant melanoma of skin (HCC)         Malignant neoplasm of prostate (HCC)         Generalized anxiety disorder         Vitamin D deficiency         Prediabetes         Mixed hyperlipidemia         Morbid (severe) obesity due to excess calories (HCC)           Screening for prostate cancer    Orders:    PSA, Total Screen; Future  A/P: Doing ok and will check PSA. Appreciate general sx input. Suspect strain/sprain of the LS spine. Unable to use NSAID for now due to GI s/s. Will start tylenol and muscle relaxant. Discussed vaccines and will update his flu vaccines. . Keep f/u with GI. Continue current treatment and RTC four months for routine. Will call in one week about his back.      History of Present Illness     WM RTC for f/u HTN, GERD, etc. Doing ok and no new issues except for a one week h/o LBP w/o radiation. No trauma.. N/V and abdominal discomfort are improving with PPI,but not gone. . F/u US with gallstones and UGI with haital hernia. Seen by general sx and feel pt needs an EGD. Awaiting GI eval. Remains active w/o difficulty and no falls. ELIDIA and chronic pain is manageable. Prostate ca and melanoma are in remission. BRITTANI is controlled. Due for labs and vaccines.           Review of Systems   Constitutional:  Negative for activity change, chills, diaphoresis, fatigue and fever.   HENT: Negative.     Eyes:  Negative for visual disturbance.   Respiratory:  Negative for  cough, chest tightness, shortness of breath and wheezing.    Cardiovascular:  Negative for chest pain, palpitations and leg swelling.   Gastrointestinal:  Negative for abdominal pain, constipation, diarrhea, nausea and vomiting.   Endocrine: Negative for cold intolerance and heat intolerance.   Genitourinary:  Negative for difficulty urinating, dysuria and frequency.   Musculoskeletal:  Positive for back pain. Negative for arthralgias, gait problem and myalgias.   Neurological:  Negative for dizziness, seizures, syncope, weakness, light-headedness and headaches.   Psychiatric/Behavioral:  Negative for confusion, dysphoric mood and sleep disturbance. The patient is not nervous/anxious.            Objective     There were no vitals taken for this visit.    Physical Exam  Vitals and nursing note reviewed.   Constitutional:       General: He is not in acute distress.     Appearance: Normal appearance. He is obese. He is not ill-appearing.   HENT:      Head: Normocephalic and atraumatic.      Mouth/Throat:      Mouth: Mucous membranes are moist.   Eyes:      Extraocular Movements: Extraocular movements intact.      Conjunctiva/sclera: Conjunctivae normal.      Pupils: Pupils are equal, round, and reactive to light.   Neck:      Vascular: No carotid bruit.   Cardiovascular:      Rate and Rhythm: Normal rate and regular rhythm.      Heart sounds: Normal heart sounds. No murmur heard.  Pulmonary:      Effort: Pulmonary effort is normal. No respiratory distress.      Breath sounds: Normal breath sounds. No wheezing, rhonchi or rales.   Abdominal:      General: Bowel sounds are normal. There is no distension.      Palpations: Abdomen is soft.      Tenderness: There is no abdominal tenderness.   Musculoskeletal:         General: Tenderness present.      Cervical back: Neck supple.      Right lower leg: No edema.      Left lower leg: No edema.      Comments: LS Spine w/o gross deformities, increase temp, erythema, swelling, or  lesions. Tenderness left of L2-L4. . ROM wnl. Spasms noted. LE strength 5/5 with tone/ROM WNL. DTR 2/4.  No gait abnormalities(toe/heel walking).       Neurological:      General: No focal deficit present.      Mental Status: He is alert and oriented to person, place, and time. Mental status is at baseline.   Psychiatric:         Mood and Affect: Mood normal.         Behavior: Behavior normal.         Thought Content: Thought content normal.         Judgment: Judgment normal.

## 2024-10-31 ENCOUNTER — HOSPITAL ENCOUNTER (OUTPATIENT)
Dept: GASTROENTEROLOGY | Facility: HOSPITAL | Age: 64
Setting detail: OUTPATIENT SURGERY
End: 2024-10-31
Attending: STUDENT IN AN ORGANIZED HEALTH CARE EDUCATION/TRAINING PROGRAM
Payer: COMMERCIAL

## 2024-10-31 ENCOUNTER — ANESTHESIA EVENT (OUTPATIENT)
Dept: GASTROENTEROLOGY | Facility: HOSPITAL | Age: 64
End: 2024-10-31
Payer: COMMERCIAL

## 2024-10-31 ENCOUNTER — ANESTHESIA (OUTPATIENT)
Dept: GASTROENTEROLOGY | Facility: HOSPITAL | Age: 64
End: 2024-10-31
Payer: COMMERCIAL

## 2024-10-31 VITALS
SYSTOLIC BLOOD PRESSURE: 138 MMHG | TEMPERATURE: 98.5 F | HEART RATE: 56 BPM | DIASTOLIC BLOOD PRESSURE: 79 MMHG | OXYGEN SATURATION: 92 % | RESPIRATION RATE: 18 BRPM

## 2024-10-31 DIAGNOSIS — K21.9 GASTROESOPHAGEAL REFLUX DISEASE WITHOUT ESOPHAGITIS: ICD-10-CM

## 2024-10-31 DIAGNOSIS — R11.14 BILIOUS VOMITING WITH NAUSEA: ICD-10-CM

## 2024-10-31 DIAGNOSIS — Z98.84 S/P LAPAROSCOPIC SLEEVE GASTRECTOMY: ICD-10-CM

## 2024-10-31 PROCEDURE — 43239 EGD BIOPSY SINGLE/MULTIPLE: CPT | Performed by: STUDENT IN AN ORGANIZED HEALTH CARE EDUCATION/TRAINING PROGRAM

## 2024-10-31 PROCEDURE — 88305 TISSUE EXAM BY PATHOLOGIST: CPT | Performed by: STUDENT IN AN ORGANIZED HEALTH CARE EDUCATION/TRAINING PROGRAM

## 2024-10-31 RX ORDER — SODIUM CHLORIDE, SODIUM LACTATE, POTASSIUM CHLORIDE, CALCIUM CHLORIDE 600; 310; 30; 20 MG/100ML; MG/100ML; MG/100ML; MG/100ML
125 INJECTION, SOLUTION INTRAVENOUS CONTINUOUS
Status: DISCONTINUED | OUTPATIENT
Start: 2024-10-31 | End: 2024-11-04 | Stop reason: HOSPADM

## 2024-10-31 RX ORDER — LIDOCAINE HYDROCHLORIDE 20 MG/ML
INJECTION, SOLUTION EPIDURAL; INFILTRATION; INTRACAUDAL; PERINEURAL AS NEEDED
Status: DISCONTINUED | OUTPATIENT
Start: 2024-10-31 | End: 2024-10-31

## 2024-10-31 RX ORDER — KETAMINE HYDROCHLORIDE 50 MG/ML
INJECTION, SOLUTION INTRAMUSCULAR; INTRAVENOUS AS NEEDED
Status: DISCONTINUED | OUTPATIENT
Start: 2024-10-31 | End: 2024-10-31

## 2024-10-31 RX ORDER — PROPOFOL 10 MG/ML
INJECTION, EMULSION INTRAVENOUS AS NEEDED
Status: DISCONTINUED | OUTPATIENT
Start: 2024-10-31 | End: 2024-10-31

## 2024-10-31 RX ORDER — LIDOCAINE HYDROCHLORIDE 10 MG/ML
0.5 INJECTION, SOLUTION EPIDURAL; INFILTRATION; INTRACAUDAL; PERINEURAL ONCE AS NEEDED
Status: DISCONTINUED | OUTPATIENT
Start: 2024-10-31 | End: 2024-11-04 | Stop reason: HOSPADM

## 2024-10-31 RX ADMIN — KETAMINE HYDROCHLORIDE 20 MG: 50 INJECTION, SOLUTION INTRAMUSCULAR; INTRAVENOUS at 10:12

## 2024-10-31 RX ADMIN — SODIUM CHLORIDE, SODIUM LACTATE, POTASSIUM CHLORIDE, AND CALCIUM CHLORIDE 125 ML/HR: .6; .31; .03; .02 INJECTION, SOLUTION INTRAVENOUS at 09:39

## 2024-10-31 RX ADMIN — PROPOFOL 130 MG: 10 INJECTION, EMULSION INTRAVENOUS at 10:12

## 2024-10-31 RX ADMIN — PROPOFOL 50 MG: 10 INJECTION, EMULSION INTRAVENOUS at 10:17

## 2024-10-31 RX ADMIN — LIDOCAINE HYDROCHLORIDE 100 MG: 20 INJECTION, SOLUTION EPIDURAL; INFILTRATION; INTRACAUDAL; PERINEURAL at 10:12

## 2024-10-31 NOTE — ANESTHESIA PREPROCEDURE EVALUATION
Procedure:  EGD    Relevant Problems   CARDIO   (+) Essential hypertension   (+) First degree AV block   (+) Mixed hyperlipidemia   (+) Mobitz (type) I (Wenckebach's) atrioventricular block      GI/HEPATIC   (+) Gastroesophageal reflux disease without esophagitis   (+) Hiatal hernia      /RENAL   (+) BPH without obstruction/lower urinary tract symptoms   (+) Malignant neoplasm of prostate (HCC)      MUSCULOSKELETAL   (+) Hiatal hernia   (+) Primary osteoarthritis of both knees      NEURO/PSYCH   (+) Generalized anxiety disorder      PULMONARY   (+) Obstructive sleep apnea        Physical Exam    Airway    Mallampati score: III  TM Distance: >3 FB  Neck ROM: full     Dental   No notable dental hx     Cardiovascular  Rhythm: regular, Rate: normal, Cardiovascular exam normal    Pulmonary  Pulmonary exam normal Breath sounds clear to auscultation    Other Findings        Anesthesia Plan  ASA Score- 4     Anesthesia Type- IV sedation with anesthesia with ASA Monitors.         Additional Monitors:     Airway Plan:     Comment: Discussed risks/benefits, including medication reactions, awareness, aspiration, and serious/life threatening complications. Plan to maintain native airway with IVGA, monitored with EtCO2.       Plan Factors-Exercise tolerance (METS): >4 METS.    Chart reviewed.    Patient summary reviewed.      Patient instructed to abstain from smoking on day of procedure. Patient did not smoke on day of surgery.            Induction- intravenous.    Postoperative Plan-         Informed Consent- Anesthetic plan and risks discussed with patient.  I personally reviewed this patient with the CRNA. Discussed and agreed on the Anesthesia Plan with the CRNA..

## 2024-10-31 NOTE — ANESTHESIA POSTPROCEDURE EVALUATION
Post-Op Assessment Note    CV Status:  Stable  Pain Score: 0    Pain management: adequate       Mental Status:  Alert and awake   Hydration Status:  Euvolemic   PONV Controlled:  Controlled   Airway Patency:  Patent     Post Op Vitals Reviewed: Yes    No anethesia notable event occurred.    Staff: CRNA         Last Filed PACU Vitals:  Vitals Value Taken Time   Temp 98    Pulse 56 10/31/24 1030   /79 10/31/24 1030   Resp 18 10/31/24 1030   SpO2 92 % 10/31/24 1030       Modified [place]:  Activity: 2 (10/31/2024 10:30 AM)  Respiration: 2 (10/31/2024 10:30 AM)  Circulation: 2 (10/31/2024 10:30 AM)  Consciousness: 2 (10/31/2024 10:30 AM)  Oxygen Saturation: 2 (10/31/2024 10:30 AM)  Modified [place] Score: 10 (10/31/2024 10:30 AM)

## 2024-10-31 NOTE — H&P
[facility]  History & Physical     PATIENT INFO     Name: Gibran Calvillo  YOB: 1960   Age: 64 y.o.   Sex: male   MRN: [id number]     HISTORY OF PRESENT ILLNESS     Gibran Calvillo is a 64 y.o. year old male who presents for EGD for GERD, history of sleeve gastrectomy, nausea/vomiting. No antithrombotics and anticoagulants.     REVIEW OF SYSTEMS     Per the HPI, and otherwise unremarkable.    Historical Information   Past Medical History:   Diagnosis Date    Arthritis     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HCC)     Cancer (HCC)     of back    COVID-19     DVT (deep venous thrombosis) (HCC)     post TKR    Generalized anxiety disorder     Hypertension     Melanoma (HCC)     Mobitz (type) I (Wenckebach's) atrioventricular block     Obesity     Primary osteoarthritis of both knees     Prostate cancer (HCC)     Sleep apnea      Past Surgical History:   Procedure Laterality Date    ABDOMINAL SURGERY      COLONOSCOPY      GASTRECTOMY SLEEVE LAPAROSCOPIC  2019    JOINT REPLACEMENT Bilateral     KNEE ARTHROSCOPY      Therapeutic; Last Assessed: 10/30/2015    KNEE SURGERY Bilateral 2006, 2008    Knee replacement    MOHS SURGERY      Mohs Micrographic Surg Trunk First Stage, up to 5 Specimens    PROSTATECTOMY      TONSILLECTOMY AND ADENOIDECTOMY      UVULECTOMY      Last Assessed: 10/30/2015     Social History   Social History     Substance and Sexual Activity   Alcohol Use Not Currently    Comment: rarely; 1 x year     Social History     Substance and Sexual Activity   Drug Use No     Social History     Tobacco Use   Smoking Status Never    Passive exposure: Never   Smokeless Tobacco Never     Family History   Problem Relation Age of Onset    Hypotension Mother     Cancer Mother     Thyroid cancer Mother     Stroke Mother         of unknown cause    Heart disease Father         cardiac disorder    Other Brother         Brain tumor    Arthritis Maternal Grandmother     Stroke Maternal  Grandmother         of unknown cause    Heart disease Maternal Grandmother         cardiac disorder    Stroke Maternal Grandfather         of unknown cause    Heart disease Paternal Grandmother         cardiac disorder    Liver cancer Paternal Grandfather     Liver cancer Other     Heart disease Other         cardiac disorder    Lung cancer Other     Prostate cancer Other     Prostate cancer Family         MEDICATIONS & ALLERGIES     Current Outpatient Medications   Medication Instructions    aspirin (ECOTRIN LOW STRENGTH) 81 mg, Oral, Daily    Calcium Carbonate-Vitamin D 600-400 MG-UNIT per tablet 1 tablet, Oral    escitalopram (LEXAPRO) 20 mg tablet take 1 tablet by mouth once daily    ketoconazole (NIZORAL) 2 % shampoo      lisinopril (ZESTRIL) 10 mg, Oral, Daily    methocarbamol (ROBAXIN) 500 mg, Oral, 4 times daily    Multiple Vitamins-Minerals (multivitamin with minerals) tablet 1 tablet, Oral, Daily    omeprazole (PRILOSEC) 20 mg, Oral, Daily before breakfast    ondansetron (ZOFRAN) 4 mg, Oral, Every 8 hours PRN    sildenafil (VIAGRA) 100 mg, Oral, Daily PRN    traZODone (DESYREL) 50 mg tablet take 1 tablet by mouth once daily at bedtime    zolpidem (AMBIEN) 10 mg, Oral, Daily at bedtime PRN     Allergies   Allergen Reactions    Penicillin V Anaphylaxis    Penicillin G Other (See Comments)        PHYSICAL EXAM      Objective   Blood pressure (!) 171/104, pulse 55, temperature 98.5 °F (36.9 °C), temperature source Temporal, resp. rate 18, SpO2 97%. There is no height or weight on file to calculate BMI.    General Appearance:   Alert, cooperative, no distress   Lungs:   Equal chest rise, respirations unlabored    Heart:   Regular rate and rhythm   Abdomen:   Soft, non-tender, non-distended; normal bowel sounds; no masses, no organomegaly    Extremities:   No edema       ASSESSMENT & PLAN     This is a 64 y.o. year old male here for EGD, and he is stable and optimized for his procedure.      Francisco Javier Moraes D.O.    [facility]  Division of Gastroenterology & Hepatology  Available on [employer]  [email or web address]    ** Please Note: This note is constructed using a voice recognition dictation system. **

## 2024-11-01 ENCOUNTER — TELEPHONE (OUTPATIENT)
Dept: SURGERY | Facility: CLINIC | Age: 64
End: 2024-11-01

## 2024-11-01 DIAGNOSIS — K44.9 HIATAL HERNIA: Primary | ICD-10-CM

## 2024-11-01 DIAGNOSIS — Z98.84 S/P LAPAROSCOPIC SLEEVE GASTRECTOMY: ICD-10-CM

## 2024-11-01 NOTE — TELEPHONE ENCOUNTER
Suzanne please reach out to the patient and advise him that Dr. Moraes and I feel that he would benefit from an outpatient consult with Dr. Frey, general and foregut, minimally invasive surgery. Based on his symptoms and his workup so far, we would like him to undergo evaluation for hiatal hernia repair.    Tim - he originally saw me for possible biliary colic but his clinical symptoms and story sound more like symptomatic hiatal hernia. He just had an EGD with GI. We would love to hear your thoughts. I put a referral in directed to you. Thank you.

## 2024-11-05 PROCEDURE — 88305 TISSUE EXAM BY PATHOLOGIST: CPT | Performed by: STUDENT IN AN ORGANIZED HEALTH CARE EDUCATION/TRAINING PROGRAM

## 2024-11-06 ENCOUNTER — TELEPHONE (OUTPATIENT)
Dept: INTERNAL MEDICINE CLINIC | Facility: CLINIC | Age: 64
End: 2024-11-06

## 2024-11-06 NOTE — TELEPHONE ENCOUNTER
----- Message from Omar Montes DO sent at 10/30/2024  7:48 AM EDT -----  Call pt in one week and see how the back is doing.

## 2024-11-08 ENCOUNTER — HOSPITAL ENCOUNTER (OUTPATIENT)
Dept: NON INVASIVE DIAGNOSTICS | Facility: HOSPITAL | Age: 64
Discharge: HOME | End: 2024-11-08
Attending: SURGERY
Payer: COMMERCIAL

## 2024-11-08 ENCOUNTER — TELEPHONE (OUTPATIENT)
Age: 64
End: 2024-11-08

## 2024-11-08 ENCOUNTER — TELEPHONE (OUTPATIENT)
Dept: INTERNAL MEDICINE CLINIC | Facility: CLINIC | Age: 64
End: 2024-11-08

## 2024-11-08 ENCOUNTER — CONSULT (OUTPATIENT)
Dept: SURGERY | Facility: CLINIC | Age: 64
End: 2024-11-08
Payer: COMMERCIAL

## 2024-11-08 VITALS
SYSTOLIC BLOOD PRESSURE: 132 MMHG | WEIGHT: 315 LBS | HEIGHT: 69 IN | RESPIRATION RATE: 14 BRPM | BODY MASS INDEX: 46.65 KG/M2 | OXYGEN SATURATION: 98 % | TEMPERATURE: 97.7 F | HEART RATE: 84 BPM | DIASTOLIC BLOOD PRESSURE: 82 MMHG

## 2024-11-08 DIAGNOSIS — K44.9 HIATAL HERNIA: Primary | ICD-10-CM

## 2024-11-08 DIAGNOSIS — Z98.84 S/P LAPAROSCOPIC SLEEVE GASTRECTOMY: ICD-10-CM

## 2024-11-08 DIAGNOSIS — K44.9 HIATAL HERNIA: ICD-10-CM

## 2024-11-08 DIAGNOSIS — M79.661 RIGHT CALF PAIN: ICD-10-CM

## 2024-11-08 DIAGNOSIS — I82.4Y9 ACUTE DEEP VEIN THROMBOSIS (DVT) OF PROXIMAL VEIN OF LOWER EXTREMITY, UNSPECIFIED LATERALITY (HCC): Primary | ICD-10-CM

## 2024-11-08 PROCEDURE — 93970 EXTREMITY STUDY: CPT | Performed by: SURGERY

## 2024-11-08 PROCEDURE — 99204 OFFICE O/P NEW MOD 45 MIN: CPT | Performed by: SURGERY

## 2024-11-08 PROCEDURE — 93970 EXTREMITY STUDY: CPT

## 2024-11-08 RX ORDER — ENOXAPARIN SODIUM 150 MG/ML
120 INJECTION SUBCUTANEOUS EVERY 12 HOURS
Qty: 4.8 ML | Refills: 0 | Status: SHIPPED | OUTPATIENT
Start: 2024-11-08 | End: 2024-11-11

## 2024-11-08 NOTE — TELEPHONE ENCOUNTER
Patient called in to make appt with Dr Walker from referral given by Dr Frey. Patient has been dealing with a lot of nausea and vomiting and is hoping he might be able to be seen sooner for a consult. Patient currently scheduled in first available for 12/6/24 and is on wait list. If possible to get patient in sooner he can be reached at [phone]

## 2024-11-08 NOTE — PROGRESS NOTES
Ambulatory Visit  Name: Gibran Calvillo      : 1960      MRN: [id number]  Encounter Provider: Tim Frey MD  Encounter Date: 2024   Encounter department: [facility] [place]    Assessment & Plan  S/P laparoscopic sleeve gastrectomy    Orders:    Esophageal manometry; Future     VAS VENOUS DUPLEX - LOWER LIMB BILATERAL; Future    Ambulatory Referral to Weight Management; Future    Ambulatory Referral to General Surgery    Hiatal hernia  64-year-old male status post laparoscopic sleeve gastrectomy in 2019 with 6 months of of regurgitation and vomiting.    Plan:  I discussed with him at length the pathophysiology of hiatal hernias as well as reflux disease after sleeve gastrectomy.  Given his dysphagia as well as the unclear etiology of his vomiting, I do think it is worthwhile obtaining esophageal manometry.  I do think this falls within the spectrum of GERD, he just does not have heartburn like symptomatology.  Either way I do think evaluation by my bariatric surgery colleagues bariatric surgery would be appropriate given his prior sleeve, as any surgical intervention would likely be revisional bariatric surgery.    We will obtain esophageal manometry and have placed a referral to bariatric surgery.    Additionally, given his significant DVT history as well as his intermittent right calf pain I would like to obtain a stat venous duplex ultrasound to evaluate for DVT.    Orders:    Esophageal manometry; Future     VAS VENOUS DUPLEX - LOWER LIMB BILATERAL; Future    Ambulatory Referral to Weight Management; Future    Ambulatory Referral to General Surgery    Right calf pain           History of Present Illness     Gibran Calvillo is a 64 y.o. male who presents with regurgitation for the past 5 to 6 months.  He has a surgical history noted for a lap sleeve gastrectomy in 2019 at [facility] with some initial significant weight loss but has had weight regain over the years.  He last followed  "up with bariatric surgery at [facility] in 2021.  Over the past 6 months or so he has noticed significant out of the blue regurgitation.  This is sometimes been immediately after eating, and has sometimes been randomly throughout the day of undigested food or bile.  Denies any preceding nausea or fullness.  No fevers, chills, chest pain, or shortness of breath.  Denies any heartburn.  He does complain of some dysphagia that happens approximately once per week to both liquids and solids.  He has been worked up with an upper GI series which I reviewed from PACS on 9/26/2024 which shows a moderate size hiatal hernia without evidence of reflux.  He also underwent upper endoscopy on 10/31/2024 which shows evidence of a prior sleeve gastrectomy and a 3 cm hiatal hernia.  Additionally per the patient he is complaining of some intermittent right calf pain for the last couple of days.  He states that he has a prior history of bilateral DVTs.      Review of Systems   Constitutional:  Negative for appetite change, chills, diaphoresis and fever.   HENT:  Negative for nosebleeds and trouble swallowing.    Eyes: Negative.    Respiratory:  Negative for cough, shortness of breath and wheezing.    Cardiovascular:  Negative for chest pain, palpitations and leg swelling.   Gastrointestinal:  Positive for vomiting. Negative for abdominal distention, abdominal pain and nausea.   Genitourinary:  Negative for difficulty urinating, flank pain and frequency.   Musculoskeletal:  Negative for arthralgias, joint swelling and myalgias.        Right calf pain.   Skin:  Negative for pallor and rash.   Neurological:  Negative for dizziness, facial asymmetry and speech difficulty.   Hematological:  Does not bruise/bleed easily.   Psychiatric/Behavioral:  Negative for agitation and confusion.    All other systems reviewed and are negative.          Objective     /82   Pulse 84   Temp 97.7 °F (36.5 °C) (Temporal)   Resp 14   Ht 5' 9\" (1.753 m)   " Wt (!) 164 kg (360 lb 9.6 oz)   SpO2 98%   BMI 53.25 kg/m²     Physical Exam  Vitals and nursing note reviewed.   Constitutional:       General: He is not in acute distress.     Appearance: Normal appearance. He is not ill-appearing.   HENT:      Head: Normocephalic and atraumatic.      Mouth/Throat:      Mouth: Mucous membranes are moist.      Pharynx: Oropharynx is clear.   Eyes:      Extraocular Movements: Extraocular movements intact.   Cardiovascular:      Rate and Rhythm: Normal rate and regular rhythm.   Pulmonary:      Effort: Pulmonary effort is normal. No respiratory distress.      Breath sounds: No stridor. No wheezing.   Abdominal:      General: There is no distension.      Palpations: Abdomen is soft. There is no mass.      Tenderness: There is no abdominal tenderness.      Hernia: No hernia is present.   Musculoskeletal:         General: Swelling present. No tenderness. Normal range of motion.      Cervical back: Neck supple.      Right lower leg: Edema present.      Left lower leg: Edema present.      Comments: Bilateral lower extremity nonpitting edema with some tenderness in the popliteal fossa on the right.   Skin:     General: Skin is warm and dry.   Neurological:      General: No focal deficit present.      Mental Status: He is alert and oriented to person, place, and time. Mental status is at baseline.   Psychiatric:         Mood and Affect: Mood normal.         Behavior: Behavior normal.

## 2024-11-08 NOTE — ASSESSMENT & PLAN NOTE
64-year-old male status post laparoscopic sleeve gastrectomy in 2019 with 6 months of of regurgitation and vomiting.    Plan:  I discussed with him at length the pathophysiology of hiatal hernias as well as reflux disease after sleeve gastrectomy.  Given his dysphagia as well as the unclear etiology of his vomiting, I do think it is worthwhile obtaining esophageal manometry.  I do think this falls within the spectrum of GERD, he just does not have heartburn like symptomatology.  Either way I do think evaluation by my bariatric surgery colleagues bariatric surgery would be appropriate given his prior sleeve, as any surgical intervention would likely be revisional bariatric surgery.    We will obtain esophageal manometry and have placed a referral to bariatric surgery.    Additionally, given his significant DVT history as well as his intermittent right calf pain I would like to obtain a stat venous duplex ultrasound to evaluate for DVT.    Orders:    Esophageal manometry; Future     VAS VENOUS DUPLEX - LOWER LIMB BILATERAL; Future    Ambulatory Referral to Weight Management; Future    Ambulatory Referral to General Surgery

## 2024-11-08 NOTE — ASSESSMENT & PLAN NOTE
Orders:    Esophageal manometry; Future     VAS VENOUS DUPLEX - LOWER LIMB BILATERAL; Future    Ambulatory Referral to Weight Management; Future    Ambulatory Referral to General Surgery

## 2024-11-12 ENCOUNTER — TELEPHONE (OUTPATIENT)
Dept: GASTROENTEROLOGY | Facility: HOSPITAL | Age: 64
End: 2024-11-12

## 2024-11-13 DIAGNOSIS — M54.50 ACUTE LEFT-SIDED LOW BACK PAIN WITHOUT SCIATICA: ICD-10-CM

## 2024-11-14 ENCOUNTER — HOSPITAL ENCOUNTER (OUTPATIENT)
Dept: GASTROENTEROLOGY | Facility: HOSPITAL | Age: 64
End: 2024-11-14
Attending: SURGERY
Payer: COMMERCIAL

## 2024-11-14 VITALS
TEMPERATURE: 98.4 F | SYSTOLIC BLOOD PRESSURE: 172 MMHG | OXYGEN SATURATION: 95 % | DIASTOLIC BLOOD PRESSURE: 83 MMHG | HEART RATE: 52 BPM | RESPIRATION RATE: 16 BRPM

## 2024-11-14 DIAGNOSIS — Z98.84 S/P LAPAROSCOPIC SLEEVE GASTRECTOMY: ICD-10-CM

## 2024-11-14 DIAGNOSIS — K44.9 HIATAL HERNIA: ICD-10-CM

## 2024-11-14 PROCEDURE — 91010 ESOPHAGUS MOTILITY STUDY: CPT

## 2024-11-14 RX ORDER — METHOCARBAMOL 500 MG/1
500 TABLET, FILM COATED ORAL 4 TIMES DAILY
Qty: 90 TABLET | Refills: 0 | Status: SHIPPED | OUTPATIENT
Start: 2024-11-14

## 2024-11-14 NOTE — PERIOPERATIVE NURSING NOTE
Patient brought in the room and explained the esophageal manometry procedure. After the confirmation of allergies, lidocaine 2 % viscous given via nostrils and  a transnasal insertion of the High Resolution esophageal manometry catheter was inserted via left nostril. Patient given water to drink during the insertion and once the catheter inserted pressure bands of both Upper esophageal sphincter  (UES) and Lower esophageal sphincter ( LES) were observed on the color contour. Patient instructed to take a deep breath to verify placement of the catheter, diaphragmatic pinch noted on inspiration. Catheter was secured to left cheek. Patient was assisted to supine position .Patient was instructed to relax  while acclimating the catheter for about 5 minutes. A 30 second baseline resting pressure was obtained to identify the UES and LES followed by a series of 10 liquid swallows using 5 cc room temperature normal saline to assess esophageal motility and bolus transit. Patient administered 10 viscous swallows using 5 cc viscous solution, 1 multiple rapid drink swallow using 2 cc room temperature normal saline given a total of 5 drinks. Patient instructed to sit up at the edge of the stretcher and given 5 upright liquid swallows using 5 cc room temperature normal saline and 1 rapid drink challenge using 200 cc room temperature water. At the end of the procedure the high resolution esophageal manometry catheter was removed from the nostril intact. Patient given discharge instructions and patient left in stable condition.

## 2024-11-15 PROCEDURE — 91010 ESOPHAGUS MOTILITY STUDY: CPT | Performed by: INTERNAL MEDICINE

## 2024-11-20 ENCOUNTER — TELEPHONE (OUTPATIENT)
Dept: INTERNAL MEDICINE CLINIC | Facility: CLINIC | Age: 64
End: 2024-11-20

## 2024-11-20 NOTE — TELEPHONE ENCOUNTER
Patient called requesting refill for Zolpidem. Patient made aware medication was refilled on 09/16/24 for 30 with 2 refills to [facility] pharmacy. Patient instructed to contact the pharmacy to obtain refills of medication. Patient verbalized understanding.

## 2024-12-06 ENCOUNTER — TELEPHONE (OUTPATIENT)
Dept: GASTROENTEROLOGY | Facility: HOSPITAL | Age: 64
End: 2024-12-06

## 2024-12-06 ENCOUNTER — CONSULT (OUTPATIENT)
Dept: BARIATRICS | Facility: CLINIC | Age: 64
End: 2024-12-06
Payer: COMMERCIAL

## 2024-12-06 VITALS
WEIGHT: 315 LBS | HEIGHT: 67 IN | HEART RATE: 66 BPM | RESPIRATION RATE: 14 BRPM | SYSTOLIC BLOOD PRESSURE: 162 MMHG | DIASTOLIC BLOOD PRESSURE: 80 MMHG | BODY MASS INDEX: 49.44 KG/M2

## 2024-12-06 DIAGNOSIS — K21.9 GERD (GASTROESOPHAGEAL REFLUX DISEASE): ICD-10-CM

## 2024-12-06 DIAGNOSIS — Z98.84 S/P LAPAROSCOPIC SLEEVE GASTRECTOMY: ICD-10-CM

## 2024-12-06 DIAGNOSIS — K91.2 POSTSURGICAL MALABSORPTION: Primary | ICD-10-CM

## 2024-12-06 DIAGNOSIS — K44.9 HIATAL HERNIA: ICD-10-CM

## 2024-12-06 DIAGNOSIS — Z01.818 ENCOUNTER FOR OTHER PREPROCEDURAL EXAMINATION: ICD-10-CM

## 2024-12-06 PROCEDURE — 99204 OFFICE O/P NEW MOD 45 MIN: CPT | Performed by: SURGERY

## 2024-12-06 NOTE — ASSESSMENT & PLAN NOTE
History of sleeve gastrectomy 2019 with a several month history of regurgitation and vomiting    Manometry and barium swallow demonstrate a 4 cm hiatal hernia with normal motility  EGD demonstrates an irregular Z-line    Given his symptoms of regurgitation and vomiting undigested food/bile with normal esophageal motility patient will be a great candidate for a laparoscopic revision with conversion of sleeve gastrectomy to Marshal-en-Y gastric bypass and hiatal hernia repair    Level 3 revision pathway  Obtain cardiac restratification  Obtain 24-hour pH study

## 2024-12-06 NOTE — PROGRESS NOTES
H&P Exam - Bariatric Surgery   Gibran Calvillo 64 y.o. male MRN: [id number]  Unit/Bed#:  Encounter: [id number]    Assessment & Plan       Hiatal hernia  History of sleeve gastrectomy 2019 with a several month history of regurgitation and vomiting    Manometry and barium swallow demonstrate a 4 cm hiatal hernia with normal motility  EGD demonstrates an irregular Z-line    Given his symptoms of regurgitation and vomiting undigested food/bile with normal esophageal motility patient will be a great candidate for a laparoscopic revision with conversion of sleeve gastrectomy to Marshal-en-Y gastric bypass and hiatal hernia repair    Level 3 revision pathway  Obtain cardiac restratification  Obtain 24-hour pH study      Postsurgical malabsorption  Obtain metabolic panel    GERD (gastroesophageal reflux disease)  See hiatal hernia    S/P laparoscopic sleeve gastrectomy  Mindful eating, stress reduction, sleep hygiene   Exercise  MVI/minerals  Obtain metabolic panel       Encounter for other preprocedural examination  Obtain cardiac restratification        History of Present Illness     HPI:  Gibran Calvillo is a 64 y.o. male who presents with nausea, regurgitation and dysphagia status post sleeve gastrectomy in 2019.  Regurgitation may be random and may also be immediately after eating and includes undigested food and bile. He complains of regurgitation and sometimes food just sitting in his throat. Patient initially weighed 428 and he got down to 270 pounds.  Over the past few months he has been complaining of nausea and small amounts of bilious emesis.    He states these symptoms are impacting his quality of life tremendously and he wishes to have some sort of resolution soon.  He now and also endorses back pain.  Patient had a barium swallow that demonstrated a small to moderate-sized hiatal hernia that did not demonstrate GERD.  Patient had a manometry that demonstrated normal esophageal motility with a 4 cm hiatal hernia.   "Patient had an endoscopy that demonstrated an irregular Z-line.  Patient is at his wits end.  Medications are not working.    Review of Systems   Gastrointestinal:  Positive for vomiting.        Regurgitation   Musculoskeletal:  Positive for back pain.   All other systems reviewed and are negative.      Historical Information   Past Medical History:   Diagnosis Date    Arthritis     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HCC)     Cancer (HCC)     of back    COVID-19     DVT (deep venous thrombosis) (HCC)     post TKR    Generalized anxiety disorder     Hypertension     Melanoma (HCC)     Mobitz (type) I (Wenckebach's) atrioventricular block     Obesity     Primary osteoarthritis of both knees     Prostate cancer (HCC)     Sleep apnea      Past Surgical History:   Procedure Laterality Date    ABDOMINAL SURGERY      COLONOSCOPY      GASTRECTOMY SLEEVE LAPAROSCOPIC  2019    JOINT REPLACEMENT Bilateral     KNEE ARTHROSCOPY      Therapeutic; Last Assessed: 10/30/2015    KNEE SURGERY Bilateral 2006, 2008    Knee replacement    MOHS SURGERY      Mohs Micrographic Surg Trunk First Stage, up to 5 Specimens    PROSTATECTOMY      TONSILLECTOMY AND ADENOIDECTOMY      UVULECTOMY      Last Assessed: 10/30/2015     Social History   Social History     Substance and Sexual Activity   Alcohol Use Not Currently    Comment: rarely; 1 x year     Social History     Substance and Sexual Activity   Drug Use No     Social History     Tobacco Use   Smoking Status Never    Passive exposure: Never   Smokeless Tobacco Never     Family History: Family history non-contributory    Meds/Allergies   all medications and allergies reviewed  Allergies   Allergen Reactions    Penicillin V Anaphylaxis    Penicillin G Other (See Comments)       Objective     Current Vitals:   Blood Pressure: 162/80 (12/06/24 0822)  Pulse: 66 (12/06/24 0822)  Respirations: 14 (12/06/24 0822)  Height: 5' 7.25\" (170.8 cm) (12/06/24 0822)  Weight - Scale: (!) 161 kg (354 lb) (12/06/24 " 0822)      Physical Exam  Constitutional:       Appearance: Normal appearance.   HENT:      Head: Atraumatic.      Nose: No rhinorrhea.   Eyes:      Extraocular Movements: Extraocular movements intact.   Cardiovascular:      Rate and Rhythm: Normal rate.   Pulmonary:      Effort: Pulmonary effort is normal. No respiratory distress.   Abdominal:      General: Abdomen is flat. There is no distension.   Musculoskeletal:         General: Normal range of motion.      Cervical back: Normal range of motion.   Skin:     General: Skin is warm and dry.   Neurological:      General: No focal deficit present.      Mental Status: He is alert and oriented to person, place, and time.   Psychiatric:         Mood and Affect: Mood normal.         Behavior: Behavior normal.         Lab Results: I have personally reviewed pertinent lab results.    Imaging: Results Review Statement: I reviewed radiology reports from this admission including: Barium swallow-small to moderate sized hiatal hernia.  EKG, Pathology, and Other Studies: Results Review Statement: I reviewed radiology reports from this admission including: Manometry-normal esophageal motility with 4 cm hiatal hernia.      Counseling / Coordination of Care  Total time spent today 45 minutes.  Greater than 50% of total time was spent with the patient, counseling and coordination of care.

## 2024-12-06 NOTE — ASSESSMENT & PLAN NOTE
Mindful eating, stress reduction, sleep hygiene   Exercise  MVI/minerals  Obtain metabolic panel

## 2024-12-10 ENCOUNTER — CLINICAL SUPPORT (OUTPATIENT)
Dept: BARIATRICS | Facility: CLINIC | Age: 64
End: 2024-12-10

## 2024-12-10 DIAGNOSIS — Z98.84 BARIATRIC SURGERY STATUS: Primary | ICD-10-CM

## 2024-12-10 PROCEDURE — RECHECK

## 2024-12-10 NOTE — PROGRESS NOTES
PT USED TO HAVE A C-PAP. BUT HAS NOT USED FOR YEARS. HIS PCP TOLD HIM NOT TO USE IT BECAUSE IT WAS SO OLD .I DON'T KNOW IF IT WORKS OR NOT. I BELIEVE HE SHOULD HAVE  A TOP/BANG DONE AGAIN AT HIS EVAL.

## 2024-12-16 ENCOUNTER — HOSPITAL ENCOUNTER (OUTPATIENT)
Dept: GASTROENTEROLOGY | Facility: HOSPITAL | Age: 64
Discharge: HOME | End: 2024-12-16
Attending: SURGERY
Payer: COMMERCIAL

## 2024-12-16 VITALS
SYSTOLIC BLOOD PRESSURE: 161 MMHG | TEMPERATURE: 97.9 F | HEART RATE: 58 BPM | RESPIRATION RATE: 16 BRPM | OXYGEN SATURATION: 96 % | DIASTOLIC BLOOD PRESSURE: 85 MMHG

## 2024-12-16 DIAGNOSIS — K44.9 HIATAL HERNIA: ICD-10-CM

## 2024-12-16 DIAGNOSIS — K21.9 GERD (GASTROESOPHAGEAL REFLUX DISEASE): ICD-10-CM

## 2024-12-16 PROCEDURE — 91038 ESOPH IMPED FUNCT TEST > 1HR: CPT

## 2024-12-16 NOTE — PERIOPERATIVE NURSING NOTE
Patient brought in room and educated on procedure. Lidocaine 2% topical solution inserted via left/right nostril and PH 44 probe inserted via left nostril and secured.Patient had a manometry study 11/14/2024. Measurement of the LES reviewed and the catheter was placed 5 cm above the proximal border of the LES. Placement was 37 cm.  Zephr monitor teach back done and patient verbalized understanding. Discharg instructions given to patient and instructed to return the next day to have the probe remove. Patient left in stable condition.

## 2024-12-19 ENCOUNTER — RESULTS FOLLOW-UP (OUTPATIENT)
Dept: BARIATRICS | Facility: CLINIC | Age: 64
End: 2024-12-19

## 2024-12-19 PROCEDURE — 91038 ESOPH IMPED FUNCT TEST > 1HR: CPT | Performed by: INTERNAL MEDICINE

## 2024-12-19 NOTE — RESULT ENCOUNTER NOTE
pH study positive for pathologic GERD - worse in the recumbent position which is likely r/t his HH. Continue revision process thank you

## 2024-12-20 NOTE — PROGRESS NOTES
Bariatric Behavioral Health Evaluation    Presenting Problem: 64 year old male ( 10/7/) here for behavioral health evaluation for Level 3 Revision. S/P Sleeve Gastrectomy at [facility] in 2019. Patient had initial consult with Dr. Walker 24.    Is the patient seeking Bariatric Surgery Eval? Yes      Realizes Post- Op Requirements? Yes, but would benefit from more education.     Pre-morbid level of function and history of present illness: Diagnosis of hypertension, ELIDIA, GERD; prior to bariatric surgery, patient reports struggling with his weight since childhood, more recently started to regain about a year after surgery.     Psychiatric/Psychological Treatment Diagnosis: Diagnosis of BRITTANI, prescribed Lexapro by his PCP. Patient educated on the benefits of outpatient therapy to develop positive coping skills and habits for success long term, patient will reach out for support if needed in the future.    Outpatient Counselor No     Psychiatrist No     Have you had Inpatient Treatment? No    Family Constellation: Patient currently lives alone with his english claudy Jaimes.    Trauma/Abuse History:  adult trauma     Additional comments/stressors related to family/relationships/peer support: Patient identifies his siblings and his best friend as his support. Patient identifies his parent's health as well as his health as current stressors.    Physical/Psychological Assessment:     Appearance: appropriate  Sociability: average  Affect: appropriate  Mood: calm  Thought Process: coherent  Speech: normal  Content: no impairment  Orientation: person  Yes , place  Yes , time  Yes , normal attention span  Yes , normal memory  Yes  , and normal judgement  Yes   Insight: emotional  good    Risk Assessment:     none    Recommendations: Recommended for surgery  yes    Risk of Harm to Self or Others: Patient denies SI or HI     Observation:     Interviews: This interview only.    Based on the previous information, the client presents  "the following risk of harm to self or others: low     Note: Patient here for behavioral health evaluation. Diagnosis of BRITTANI, prescribed Lexapro by his PCP. Patient educated on the benefits of outpatient therapy to develop positive coping skills and habits for success long term, patient will reach out for support if needed in the future. Patient denies any current substance use. Denies tobacco use. Alcohol use 1x per year. Patient educated on the impact of nicotine and alcohol on the post bariatric patient. Patient meets criteria for surgery at this program and will follow up with LCSW next month.  Patient currently lives alone with his english claudy Jaimes. 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 Patient is a retired . His dad is suffering from stage 4 lung cancer and his mom just got out of the hospital and rehab for heart issues. Patient lives next door to them, so has to help them with day to day tasks. Will get help from his siblings as well. Patient lost his brother 17 years ago- his brother was 47 at the time- had a brain tumor that led to an infection. Patient reports that he is always the \"fixer\" in the family. Patient reports that coaching football and wrestling are a \"release for him. 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 Patient reports skipping meals due to the stress of his parent's health. Patient drinks 16 oz water, 20 oz gatorade, 8-16 oz juice, 10-20 oz cups coffee, occasionally soda. Patient gets about 6 hours of sleep, trouble falling asleep.  Goals discussed:   Be mindful not to skip meals  Increase water intake  Be mindful of emotional eating  4. Continue to increase positive coping skills- be mindful of avoidance  Workflow reviewed:   Psych and/or D+A Clearance: N/A  Support Group: No longer required, strongly encouraged  Surgeon Appt: 12/6/24  Manometry: completed 11/14/24  24Hph: completed 12/16/24  EGD: " completed 10/31/24  Cardiac Risk Assessment: Needs to schedule  Sleep Studies: SB done today 6/8- needs to schedule consult  Blood work: RD to see what is needed  Nicotine test: N/A  Weight loss meds: N/A  Required weight checks: Patient to follow up monthly until surgery  Weight Loss: Not required, encouraged positive lifestyle changes.  AMI MgW

## 2024-12-20 NOTE — PROGRESS NOTES
"Bariatric Nutrition Assessment Note    Revision Level 3   S/[facility] [place] with a several month history of regurgitation and vomiting     GI Work-up Completed   Manometry: completed 11/14/24  24Hph: completed 12/16/24  EGD: completed 10/31/24  Consult with Dr. Porfirio osborne 12/6/2024  for nausea, regurgitation and dysphagia post sleeve.  Symptoms impacting his quality of life  . Plan for laparoscopic revision with conversion of sleeve gastrectomy to Marshal-en-Y gastric bypass and hiatal hernia repair     GI Work-up Completed   Barium swallow that demonstrated a small to moderate-sized hiatal hernia that did not demonstrate GERD.   EGD: completed 10/31/24 demonstrated an irregular Z-line.   Manometry: completed 11/14/24 demonstrated normal esophageal motility with a 4 cm hiatal hernia.   24 hr pH: completed 12/16/24  suggestive of pathologic GERD in the recumbent position.  Additionally, patient with a significant amount of recumbent reflux which is likely a result of hiatal hernia.     Nutrition Assessment   Gibran Calvillo  64 y.o.  male  Ht 5' 7.2\" (1.707 m)   Wt (!) 160 kg (353 lb)   BMI 54.96 kg/m²     Height: 5'7.2\"    Current Weight: 353#   BMI: 55  Weight Prior to Weight Loss Surgery: 428  Weight in (lb) to have BMI = 25: 160.5  EVIE:270  Regain: 80-85#    Grady- St. Jeor Equation: BMR = 2358   Estimated calories for weight maintenance:    2800  ( sedentary  )   Estimated calories for weight loss 8909-4544 ( 1-2# per wk wt loss - sedentary )  Estimated protein needs  grams (1.0-1.5 gms/kg IBW )   Estimated fluid needs 73-85 oz (30-35 ml/kg IBW )          Review of History and Medications   Past Medical History:   Diagnosis Date    Arthritis     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HCC)     Cancer (HCC)     of back    COVID-19     DVT (deep venous thrombosis) (HCC)     post TKR    Generalized anxiety disorder     Hypertension     Melanoma (HCC)     Mobitz (type) I (Wenckebach's) " atrioventricular block     Obesity     Primary osteoarthritis of both knees     Prostate cancer (HCC)     Sleep apnea      Past Surgical History:   Procedure Laterality Date    ABDOMINAL SURGERY      COLONOSCOPY      GASTRECTOMY SLEEVE LAPAROSCOPIC  2019    JOINT REPLACEMENT Bilateral     KNEE ARTHROSCOPY      Therapeutic; Last Assessed: 10/30/2015    KNEE SURGERY Bilateral 2006, 2008    Knee replacement    MOHS SURGERY      Mohs Micrographic Surg Trunk First Stage, up to 5 Specimens    PROSTATECTOMY      TONSILLECTOMY AND ADENOIDECTOMY      UVULECTOMY      Last Assessed: 10/30/2015     Social History     Socioeconomic History    Marital status: Single     Spouse name: Not on file    Number of children: Not on file    Years of education: Not on file    Highest education level: Not on file   Occupational History    Occupation:    Tobacco Use    Smoking status: Never     Passive exposure: Never    Smokeless tobacco: Never   Vaping Use    Vaping status: Never Used   Substance and Sexual Activity    Alcohol use: Not Currently     Comment: rarely; 1 x year    Drug use: No    Sexual activity: Yes     Partners: Female   Other Topics Concern    Not on file   Social History Narrative    Full time employment     Social Drivers of Health     Financial Resource Strain: Not on file   Food Insecurity: Not on file   Transportation Needs: Not on file   Physical Activity: Not on file   Stress: Not on file   Social Connections: Not on file   Intimate Partner Violence: Not on file   Housing Stability: Not on file       Current Outpatient Medications:     aspirin (ECOTRIN LOW STRENGTH) 81 mg EC tablet, Take 81 mg by mouth daily, Disp: , Rfl:     Calcium Carbonate-Vitamin D 600-400 MG-UNIT per tablet, Take 1 tablet by mouth, Disp: , Rfl:     enoxaparin (LOVENOX) 120 mg/0.8 mL, Inject 0.8 mL (120 mg total) under the skin every 12 (twelve) hours for 3 days, Disp: 4.8 mL, Rfl: 0    escitalopram (LEXAPRO) 20 mg tablet,  take 1 tablet by mouth once daily, Disp: 90 tablet, Rfl: 1    ketoconazole (NIZORAL) 2 % shampoo,  , Disp: , Rfl:     lisinopril (ZESTRIL) 10 mg tablet, take 1 tablet by mouth once daily, Disp: 90 tablet, Rfl: 1    methocarbamol (ROBAXIN) 500 mg tablet, take 1 tablet by mouth four times a day, Disp: 90 tablet, Rfl: 0    Multiple Vitamins-Minerals (multivitamin with minerals) tablet, Take 1 tablet by mouth daily, Disp: , Rfl:     omeprazole (PriLOSEC) 20 mg delayed release capsule, Take 1 capsule (20 mg total) by mouth daily before breakfast, Disp: 90 capsule, Rfl: 3    ondansetron (ZOFRAN) 4 mg tablet, Take 1 tablet (4 mg total) by mouth every 8 (eight) hours as needed for nausea or vomiting, Disp: 30 tablet, Rfl: 1    rivaroxaban (XARELTO) 15 mg tablet, Take 1 tablet (15 mg total) by mouth 2 (two) times a day for 21 days, Disp: 42 tablet, Rfl: 0    rivaroxaban (XARELTO) 20 mg tablet, Take 1 tablet (20 mg total) by mouth daily with breakfast, Disp: 90 tablet, Rfl: 3    sildenafil (VIAGRA) 100 mg tablet, Take 1 tablet (100 mg total) by mouth daily as needed for erectile dysfunction, Disp: 6 tablet, Rfl: 0    traZODone (DESYREL) 50 mg tablet, take 1 tablet by mouth once daily at bedtime, Disp: 30 tablet, Rfl: 3    zolpidem (AMBIEN) 10 mg tablet, Take 1 tablet (10 mg total) by mouth daily at bedtime as needed for sleep, Disp: 30 tablet, Rfl: 2    Food Intake and Lifestyle Assessment   Food Intake Assessment completed via usual diet recall / Care for parents - Coaches dariel  Breakfast: Has coffee in morning  - grabs burger   - having sweets refined carbs tolerated better  Beverage intake: water 16-32 oz  2% Fairlife  Milk 16 oz , juice 8 oz , coffee 1-2 cups,Crystal Light - 4 oz   Diet texture/stage: regular  Protein supplement: Not didn't   Estimated protein intake per day: varied intake - Est averages 60-65 grams  Estimated fluid intake per day: 60 oz   Meals eaten away from home: Several  Typical meal pattern:  "No pattern  Eating Behaviors: Appropriate diet advancement, Appropriate portion sizes, Consumption of high calorie/ high fat foods, Consumption of high calorie beverages, Mindless eating, Emotional eating, and Craves sweet foods  Food allergies or intolerances: None  Cultural or Alevism considerations: None  Vitamin and Mineral regimen One a day Multi vitamin and Calcium with Vitamin D - Advised tart of Bariatric formula and gave sample bottle to try.     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  Types of exercise: Walking  Current physical limitations: Has knee replacements- can't run anymore    Psychosocial Assessment   Support systems: Best friend and  Siblings (Living Brother & sister - One brother passed at 46yo from Brain Cancer-) lSocioeconomic factors: Retired teacher   Lives next to elderly parents  86 yo Mom with heart issues and 92 yo dad with Stage 4 Lung CA  Has 1 yo Bulldog - Gwyn      Nutrition Diagnosis  Diagnosis: Overweight / Obesity (NC-3.3)  Related to: Limited adherence to nutrition-related recommendations and Altered GI function (sleeve)   As Evidenced by: Unintentional weight gain d/t inabilty to keep to post op diet per reflux symptoms    Nutrition Prescription: Recommend the following diet  Low fat, Low sugar, and Regular      Interventions and Teaching   Patient educated on post-op nutrition guidelines.       Patient educated and handouts provided.  Surgical changes to stomach / GI  Capacity of post-surgery stomach  Diet progression  Adequate hydration  Sugar and fat restriction to decrease \"dumping syndrome\"  Expected weight loss  Weight loss plateaus/ possibility of weight regain  Exercise  Suggestions for pre-op diet  Nutrition considerations after surgery  Protein supplements  Meal planning and preparation  Appropriate carbohydrate, protein, and fat intake, and food/fluid " choices to maximize safe weight loss, nutrient intake, and tolerance   Dietary and lifestyle changes  Possible problems with poor eating habits  Intuitive eating  Techniques for self monitoring and keeping daily food journal  Potential for food intolerance after surgery, and ways to deal with them including: lactose intolerance, nausea, reflux, vomiting, diarrhea, food intolerance, appetite changes, gas  Vitamin / Mineral supplementation of Multivitamin with minerals and Calcium    Patient is not currently pregnant and doesn't desire to become pregnant a minimum of one year post-op    Education provided to: patient    Barriers to learning: No barriers identified    Readiness to change: preparation    Comprehension: verbalizes understanding     Expected Compliance: good    Evaluation/Monitoring   Eating pattern as discussed Tolerance of nutrition prescription Body weight Lab values Physical activity Bowel pattern    Goals  Eliminate sugar sweetened beverages, Food journal, Exercise 30 minutes 5 times per week, Complete lession plans 1-6, Eat 3 meals per day, and Eliminate mindless snacking  Keep to post op guidelines as able   Increase protein (80-85 grams)  Increase hydration ( 64 oz )  Avoid grazing - add protein to snacks   Volume at 1.25 c per meals - Macro goals as discussed  Keep to vitamin regimen as advised ( Corbin Multi with 45 mg Iron and 1500 mg Calcium)   Increase physical activity and include exercise as able  F/U bariatric providers per Revision pathway       Time Spent:   1 Hour

## 2024-12-23 ENCOUNTER — CLINICAL SUPPORT (OUTPATIENT)
Dept: BARIATRICS | Facility: CLINIC | Age: 64
End: 2024-12-23

## 2024-12-23 VITALS — HEIGHT: 67 IN | BODY MASS INDEX: 49.44 KG/M2 | WEIGHT: 315 LBS

## 2024-12-23 DIAGNOSIS — Z71.89 ENCOUNTER FOR PRE-BARIATRIC SURGERY COUNSELING AND EDUCATION: Primary | ICD-10-CM

## 2024-12-23 DIAGNOSIS — E66.01 MORBID (SEVERE) OBESITY DUE TO EXCESS CALORIES (HCC): ICD-10-CM

## 2024-12-23 DIAGNOSIS — Z01.818 PREOP TESTING: Primary | ICD-10-CM

## 2024-12-23 PROCEDURE — RECHECK

## 2024-12-24 DIAGNOSIS — G47.9 SLEEP DISTURBANCE: ICD-10-CM

## 2024-12-24 RX ORDER — ZOLPIDEM TARTRATE 10 MG/1
10 TABLET ORAL
Qty: 30 TABLET | Refills: 2 | Status: SHIPPED | OUTPATIENT
Start: 2024-12-24

## 2024-12-24 NOTE — TELEPHONE ENCOUNTER
Patient request refill of Zolepidem 10 mg.  Please fill with [facility] #[id number].    Requested Prescriptions     Pending Prescriptions Disp Refills    zolpidem (AMBIEN) 10 mg tablet 30 tablet 2     Sig: Take 1 tablet (10 mg total) by mouth daily at bedtime as needed for sleep

## 2024-12-26 RX ORDER — ZOLPIDEM TARTRATE 10 MG/1
10 TABLET ORAL
Qty: 90 TABLET | Refills: 0 | Status: SHIPPED | OUTPATIENT
Start: 2024-12-26

## 2025-01-15 ENCOUNTER — OFFICE VISIT (OUTPATIENT)
Dept: SLEEP CENTER | Facility: CLINIC | Age: 65
End: 2025-01-15
Payer: COMMERCIAL

## 2025-01-15 VITALS
HEIGHT: 67 IN | BODY MASS INDEX: 49.44 KG/M2 | OXYGEN SATURATION: 95 % | HEART RATE: 58 BPM | WEIGHT: 315 LBS | DIASTOLIC BLOOD PRESSURE: 101 MMHG | SYSTOLIC BLOOD PRESSURE: 175 MMHG

## 2025-01-15 DIAGNOSIS — R73.03 PREDIABETES: ICD-10-CM

## 2025-01-15 DIAGNOSIS — F51.01 PRIMARY INSOMNIA: ICD-10-CM

## 2025-01-15 DIAGNOSIS — E66.01 MORBID (SEVERE) OBESITY DUE TO EXCESS CALORIES (HCC): ICD-10-CM

## 2025-01-15 DIAGNOSIS — F41.1 GENERALIZED ANXIETY DISORDER: ICD-10-CM

## 2025-01-15 DIAGNOSIS — R06.83 SNORING: Primary | ICD-10-CM

## 2025-01-15 DIAGNOSIS — I10 ESSENTIAL HYPERTENSION: ICD-10-CM

## 2025-01-15 PROCEDURE — 99204 OFFICE O/P NEW MOD 45 MIN: CPT | Performed by: STUDENT IN AN ORGANIZED HEALTH CARE EDUCATION/TRAINING PROGRAM

## 2025-01-15 NOTE — PATIENT INSTRUCTIONS
"- A sleep study was ordered for you. It can be an in lab sleep study or a portable at home sleep study. It depends on what insurance approves.  Some insurances will only cover home sleep studies unless you have significant medical conditions like stroke or heart attack within the last 6 months, severe COPD, or the use of supplemental oxygen.    - The order goes electronically to the sleep center staff.    - The sleep center will get approval from your insurance and then will call you to schedule the sleep study.    - If you do not hear from the sleep center in 1 week, please call us to check the status of your order.    - There are different locations to get sleep study done.    - Please make sure you know what is the copay associated with your sleep study. Approval does not mean coverage.     - Once your sleep study is done, it can take up to 2 weeks to get results (depending on the volume).    - A follow up appointment to discuss sleep study results is required in most cases. On that visit, we will discuss results and treatment options.    - If your sleep study shows severe sleep apnea please expect contact from the sleep center. We will try to expedite your follow up appointment.    - The best way to communicate with us in through [facility] My [facility]. Make sure your account is active.    - Once you start CPAP therapy, it is mandatory by insurance, to have a follow up appointment with us within 31-90 days of getting CPAP.     Patient Education     Sleep apnea in adults   The Basics   Written by the doctors and editors at [facility]   What is sleep apnea? -- Sleep apnea is a condition that makes you stop breathing for short periods while you are asleep. There are 2 types of sleep apnea. One is called \"obstructive sleep apnea.\" The other is called \"central sleep apnea.\"  In obstructive sleep apnea, you stop breathing because your throat narrows or closes (figure 1). In central sleep apnea, you stop breathing because your " "brain does not send the right signals to your muscles to make you breathe. When people talk about sleep apnea, they are usually referring to obstructive sleep apnea, which is what this article is about.  People with sleep apnea do not know that they stop breathing when they are asleep. But they do sometimes wake up startled or gasping for breath. They also often hear from loved ones that they snore.  What are the symptoms of sleep apnea? -- The main symptoms of sleep apnea are loud snoring, tiredness, and daytime sleepiness. Other symptoms can include:   Restless sleep   Waking up choking or gasping   Morning headaches, dry mouth, or sore throat   Waking up often to urinate   Waking up feeling unrested or groggy   Trouble thinking clearly or remembering things  Some people with sleep apnea don't have symptoms, or don't realize that they have them.  Should I see a doctor or nurse? -- Yes. If you think that you might have sleep apnea, see your doctor.  Is there a test for sleep apnea? -- Yes. First, your doctor or nurse will ask about your symptoms. If you have a bed partner, they might also ask that person if you snore or gasp in your sleep. If the doctor or nurse suspects that you have sleep apnea, they might send you for a \"sleep study.\"  Sleep studies can sometimes be done at home, but they are usually done in a sleep lab. For the study, you spend the night in the lab, and you are hooked up to different machines that monitor your heart rate, breathing, and other body functions. The results of the test tell your doctor or nurse if you have the disorder.  Is there anything I can do on my own to help my sleep apnea? -- Yes. Some things that might help:   Try to avoid sleeping on your back, if possible. This might help some people.   Lose weight, if you have excess body weight.   Get regular physical activity. This might help you lose weight. But even if it doesn't, being active is good for your health.   Avoid " "alcohol, especially in the evening. Alcohol can make sleep apnea worse.  How is sleep apnea treated? -- Treatment can include:   Weight loss - As mentioned above, weight loss can help if you have excess weight or obesity. But losing weight can be challenging, and it takes time to lose enough weight to help with your sleep apnea. Most people need other treatment while they work on losing weight.   CPAP - The most effective treatment for sleep apnea is a device that keeps your airway open while you sleep. Treatment with this device is called \"continuous positive airway pressure\" (\"CPAP\"). People getting CPAP wear a face mask at night that keeps them breathing (figure 2).  If your doctor or nurse recommends a CPAP machine, be patient about using it. The mask might seem uncomfortable to wear at first, and the machine might seem noisy, but using the machine can really help you. People with sleep apnea who use a CPAP machine feel more rested and generally feel better.  If CPAP does not work, your doctor might suggest other treatment. Options might include:   An oral device - This is a device that you wear in your mouth. It is called an \"oral appliance\" or \"mandibular advancement device.\" It helps keep your airway open while you sleep.   Hypoglossal nerve stimulation - This involves a procedure to implant a small device into your chest. The device has a wire that connects to the nerve under your tongue. While you are sleeping, it sends an electrical signal that causes the tongue to push forward. This helps open up your airway.   Surgery to widen your airway - This might involve removing your tonsils or other tissue that blocks the airway.  Is sleep apnea dangerous? -- It can be. Risks include:   Accidents - People with sleep apnea do not get good-quality sleep, so they are often tired and not alert. This puts them at risk for car accidents and other types of accidents.   Other health problems - Studies show that people " with sleep apnea are more likely than others to have high blood pressure, heart attacks, and other serious heart problems. Some people also have mood changes or depression.  In people with severe sleep apnea, getting treated (for example, with CPAP) can help lower these risks.  All topics are updated as new evidence becomes available and our peer review process is complete.  This topic retrieved from [employer] on: Feb 28, 2024.  Topic 40416 Version 18.0  Release: 32.2.4 - C32.58  © 2024 UpToDate, Inc. and/or its affiliates. All rights reserved.  figure 1: Airway in a person with sleep apnea     Normally, when a person sleeps, the airway remains open, and air can pass from the nose and mouth to the lungs. In a person with sleep apnea, parts of the throat and mouth drop into the airway and block off the flow of air. This can cause loud snoring and interrupt breathing for short periods.  Graphic 74362 Version 6.0  figure 2: Continuous positive airway pressure (CPAP) for sleep apnea     The CPAP mask gently blows air into your nose while you sleep. It puts just enough pressure on your airway to keep it from closing. The mask in this picture fits over just the nose. Other CPAP devices have masks that fit over the nose and mouth.  Graphic 94620 Version 5.0  Consumer Information Use and Disclaimer   Disclaimer: This generalized information is a limited summary of diagnosis, treatment, and/or medication information. It is not meant to be comprehensive and should be used as a tool to help the user understand and/or assess potential diagnostic and treatment options. It does NOT include all information about conditions, treatments, medications, side effects, or risks that may apply to a specific patient. It is not intended to be medical advice or a substitute for the medical advice, diagnosis, or treatment of a health care provider based on the health care provider's examination and assessment of a patient's specific and unique  circumstances. Patients must speak with a health care provider for complete information about their health, medical questions, and treatment options, including any risks or benefits regarding use of medications. This information does not endorse any treatments or medications as safe, effective, or approved for treating a specific patient. UpToDate, Inc. and its affiliates disclaim any warranty or liability relating to this information or the use thereof.The use of this information is governed by the Terms of Use, available at https://www.woltersSelectable Mediauwer.com/en/know/clinical-effectiveness-terms. 2024© UpToDate, Inc. and its affiliates and/or licensors. All rights reserved.  Copyright   © 2024 UpToDate, Inc. and/or its affiliates. All rights reserved.

## 2025-01-15 NOTE — PROGRESS NOTES
Name: Gibran Calvillo      : 1960      MRN: [id number]  Encounter Provider: Costa Angulo MD  Encounter Date: 1/15/2025   Encounter department: [facility] SLEEP MEDICINE [facility]  :  Assessment & Plan  Snoring  Obstructive Sleep Apnea - Discussed pathophysiology of ELIDIA, consequences of untreated ELIDIA and treatment options including PAP therapy, mandibular advancement device, positional therapy, and surgical referral. - Discussed risks of sleepy driving and mitigation strategies (napping). Patient agrees to not drive if tired or sleepy. - Advised avoidance of alcohol and centrally acting medications as these can worsen ELIDIA.  -Patient with prior diagnosed history of obstructive sleep apnea and CPAP use.  He reports residual snoring significant fragmented sleep, unrefreshing sleep quality, and insomnia symptoms.  -I placed an order for a split-night study to expedite possible surgical planning for him with his bariatric surgeon.  We spent extensive time reviewing and discussing that it would be important for him to be compliant on CPAP therapy before possible bariatric surgery.  Orders:    Split Study; Future    Primary insomnia  Patient with reported symptoms of more sleep onset then sleep maintenance insomnia.  He reports being on Ambien for close to 10 years.  He reports that he has always had difficulty initially falling asleep.  We reviewed that some of his sleep symptoms may be secondary to undiagnosed and untreated sleep disordered breathing.  For sleep testing as above.  Orders:    Split Study; Future    Morbid (severe) obesity due to excess calories (HCC)  Obesity - We reviewed the association of obesity on obstructive sleep apnea and sleep disordered breathing. Encouraged patient to follow healthy diet, regular exercise regimen, and pursue weight loss to manage symptoms.   -Patient is undergoing potential evaluation for repeat bariatric surgery.  I reviewed with the patient that if his sleep  study revealed sleep disordered breathing or sleep apnea at that he would have to be compliant on CPAP therapy prior to undergoing surgery.  Patient verbalized understanding.  Orders:    Ambulatory referral to Sleep Medicine    Split Study; Future    Prediabetes  Lab Results   Component Value Date    HGBA1C 6.1 (H) 09/19/2024     Patient with history of prediabetes, most recent A1c is 6.1 from 9/19/2024.  There is an association between sleep and glucose control and insulin sensitivity.  I have encouraged patient to aim for at least 7 hours of sleep nightly for overall health and wellness.  Orders:    Split Study; Future    Generalized anxiety disorder  We reviewed the association between sleep, mood, and coexisting psychiatric disorders. We discussed the importance of obtaining adequate sleep of at least 7 hours nightly. Patient was encouraged to continue current prescribed medications and to continue follow up with their PCP/psychiatrist for further management.     Orders:    Split Study; Future    Essential hypertension  Hypertension - We reviewed the association between untreated obstructive sleep apnea and the increased risk for hypertension. Patient to continue on prescribed anti-hypertensive therapy and follow up with PCP for continuity of care.     Orders:    Split Study; Future        History of Present Illness       Sitting and reading: (Patient-Rptd) Would never doze  Watching TV: (Patient-Rptd) Slight chance of dozing  Sitting, inactive in a public place (e.g. a theatre or a meeting): (Patient-Rptd) Would never doze  As a passenger in a car for an hour without a break: (Patient-Rptd) Would never doze  Lying down to rest in the afternoon when circumstances permit: (Patient-Rptd) Slight chance of dozing  Sitting and talking to someone: (Patient-Rptd) Would never doze  Sitting quietly after a lunch without alcohol: (Patient-Rptd) Slight chance of dozing  In a car, while stopped for a few minutes in traffic:  "(Patient-Rptd) Would never doze  Total score: (Patient-Rptd) 3       Pertinent positives/negatives included in HPI and also as noted:     Objective   BP (!) 175/101   Pulse 58   Ht 5' 7.2\" (1.707 m)   Wt (!) 161 kg (354 lb)   SpO2 95%   BMI 55.11 kg/m²     [facility] New Patient Evaluation    Mr. Calvillo is a 64 y.o. male with a PMH of morbid obesity BMI 54, prior sleeve gastrectomy in 2019, hypertension, GERD, generalized anxiety disorder, who presents as a new patient for evaluation of sleep disordered breathing.     I have reviewed the bariatrics office note from 12/6/2024 with Tamie Monroy MD.    I have reviewed the general surgery office note from 11/8/2024 with Tim Frey MD.    History of Presenting Illness:    The patient snores. There are no choking and gasping episodes. There are no witnessed apneas. 1-2 episode(s) of nocturia occur per night. The patient sleeps on his chest. He sleeps with one pillow. There are no reports of nocturnal behaviors.     The patient's [place] sleepiness scale score is 3/24 (<=10 is normal). He has not been sleepy while driving. He has not had a fall-asleep motor vehicle accident. There are no reports of hypnagogic hallucinations, cataplexy or sleep paralysis.    In terms of the patient's sleep/wake cycle symptoms:  Bedtime: 11:30 PM  SOL: 15-30 minutes  Nocturnal awakenings: 1-2 X, unsure  Wakeup time: 6:30 AM  Naps: Denied, but would have given the opportunity    Total sleep time estimate: Approximately 5 hours.     Weekends are approximately similar to week days.    He has tried Ambien for poor sleep in the past.    His legs do not bother him on trying to initiate sleep.    Had a sleep study done approximately 10 years ago at [facility]. Used CPAP for some time but then was told that didn't have to continue on CPAP therapy.     No AM headaches. No regular nasal sinus congestion. Occasional wakes with dry mouth and dry throat on waking. "     Prior history of UPPP.     Past Medical History:   Diagnosis Date    Arthritis     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HCC)     Cancer (HCC)     of back    COVID-19     DVT (deep venous thrombosis) (HCC)     post TKR    Generalized anxiety disorder     Hypertension     Melanoma (HCC)     Mobitz (type) I (Wenckebach's) atrioventricular block     Obesity     Primary osteoarthritis of both knees     Prostate cancer (HCC)     Sleep apnea         Past Surgical History:   Procedure Laterality Date    ABDOMINAL SURGERY      COLONOSCOPY      GASTRECTOMY SLEEVE LAPAROSCOPIC  2019    JOINT REPLACEMENT Bilateral     KNEE ARTHROSCOPY      Therapeutic; Last Assessed: 10/30/2015    KNEE SURGERY Bilateral 2006, 2008    Knee replacement    MOHS SURGERY      Mohs Micrographic Surg Trunk First Stage, up to 5 Specimens    PROSTATECTOMY      TONSILLECTOMY AND ADENOIDECTOMY      UVULECTOMY      Last Assessed: 10/30/2015      Prior UPPP and nose surgery when he was a child.     Allergies   Allergen Reactions    Penicillin V Anaphylaxis    Penicillin G Other (See Comments)        Current Outpatient Medications on File Prior to Visit   Medication Sig Dispense Refill    aspirin (ECOTRIN LOW STRENGTH) 81 mg EC tablet Take 81 mg by mouth daily      escitalopram (LEXAPRO) 20 mg tablet take 1 tablet by mouth once daily 90 tablet 1    ketoconazole (NIZORAL) 2 % shampoo        lisinopril (ZESTRIL) 10 mg tablet take 1 tablet by mouth once daily 90 tablet 1    methocarbamol (ROBAXIN) 500 mg tablet take 1 tablet by mouth four times a day 90 tablet 0    Multiple Vitamins-Minerals (multivitamin with minerals) tablet Take 1 tablet by mouth daily      omeprazole (PriLOSEC) 20 mg delayed release capsule Take 1 capsule (20 mg total) by mouth daily before breakfast 90 capsule 3    ondansetron (ZOFRAN) 4 mg tablet Take 1 tablet (4 mg total) by mouth every 8 (eight) hours as needed for nausea or vomiting 30 tablet 1    rivaroxaban (XARELTO) 20 mg tablet Take 1  tablet (20 mg total) by mouth daily with breakfast 90 tablet 3    sildenafil (VIAGRA) 100 mg tablet Take 1 tablet (100 mg total) by mouth daily as needed for erectile dysfunction 6 tablet 0    traZODone (DESYREL) 50 mg tablet take 1 tablet by mouth once daily at bedtime 30 tablet 3    zolpidem (AMBIEN) 10 mg tablet Take 1 tablet (10 mg total) by mouth daily at bedtime as needed for sleep 30 tablet 2    zolpidem (AMBIEN) 10 mg tablet TAKE 1 TABLET BY MOUTH DAILY AT BEDTIME AS NEEDED FOR SLEEP. 90 tablet 0    Calcium Carbonate-Vitamin D 600-400 MG-UNIT per tablet Take 1 tablet by mouth      enoxaparin (LOVENOX) 120 mg/0.8 mL Inject 0.8 mL (120 mg total) under the skin every 12 (twelve) hours for 3 days 4.8 mL 0    rivaroxaban (XARELTO) 15 mg tablet Take 1 tablet (15 mg total) by mouth 2 (two) times a day for 21 days 42 tablet 0     No current facility-administered medications on file prior to visit.       Family History: His family history includes Arthritis in his maternal grandmother; Cancer in his mother; Heart disease in his father, maternal grandmother, paternal grandmother, and another family member; Hypotension in his mother; Liver cancer in his paternal grandfather and another family member; Lung cancer in an other family member; Other in his brother; Prostate cancer in his family and another family member; Stroke in his maternal grandfather, maternal grandmother, and mother; Thyroid cancer in his mother.    No family history of sleep disorders.     Social History:   Job: Retired  Caffeine: 1-2 Cups of Coffee, Occasional Soda   Alcohol: Denied  Drugs: Denied  Tobacco: Denied  Vape: Denied    Patient's medications, allergies, past medical, surgical, social and family histories were reviewed in the electronic medical record and updated as appropriate.    Review of Systems: On review of systems, the patient reports: No AM headaches. No regular nasal sinus congestion. Occasional wakes with dry mouth and dry throat  on waking.     Vitals:    01/15/25 0756   BP: (!) 175/101   Pulse: 58   SpO2: 95%       Physical Examination:  Gen: No acute distress, not visibly anxious, speaking comfortably  H&N: MM IV; s/p UPPP,  no retro/micrognathia; macroglossia; no visible thyromegaly  Neuro: Alert and oriented x3, interactive  Psych: Pleasant, normal affect  Skin: No visible rashes  Respiratory: No accessory muscle use, breathing comfortably  Cardiac: Trace visible edema of extremities  Abdomen: Soft, NT, Distended  Musculoskeletal: Normal ROM Intact of Extremities    Study Results:  I reviewed the following labs:    Lab Results   Component Value Date    FERRITIN 34 12/14/2023       Lab Results   Component Value Date    WBC 6.37 09/19/2024    HGB 15.7 09/19/2024    HCT 49.1 09/19/2024    MCV 87 09/19/2024     09/19/2024       This SmartLink has not been configured with any valid records.       Lab Results   Component Value Date    SODIUM 141 09/19/2024    K 4.3 09/19/2024     09/19/2024    CO2 29 09/19/2024    BUN 21 09/19/2024    CREATININE 0.70 09/19/2024    GLUC 103 (H) 05/24/2022    CALCIUM 8.8 09/19/2024       This SmartLink has not been configured with any valid records.       Lab Results   Component Value Date    QOX3NGWBTTXD 1.406 12/14/2023          Results/Data:  I have reviewed the results and report from the 11/8/2024 lower extremity ultrasound.    Independent findings reviewed: Acute nonocclusive DVT in paired posterior tibial and peroneal veins.    I have reviewed the results and report from the 9/25/2024 abdominal ultrasound.    Independent findings reviewed: Mild hepatomegaly and mild hepatic steatosis.  Cholelithiasis.    I answered the patient's questions to the best of my ability. We will continue with longitudinal follow-up for evaluation of sleep disordered breathing. Follow-up will be after sleep testing is completed.    Costa Angulo MD  Sleep Medicine and Internal Medicine  [facility]  [facility]  01/15/25

## 2025-01-15 NOTE — ASSESSMENT & PLAN NOTE
Hypertension - We reviewed the association between untreated obstructive sleep apnea and the increased risk for hypertension. Patient to continue on prescribed anti-hypertensive therapy and follow up with PCP for continuity of care.     Orders:    Split Study; Future

## 2025-01-15 NOTE — ASSESSMENT & PLAN NOTE
Obesity - We reviewed the association of obesity on obstructive sleep apnea and sleep disordered breathing. Encouraged patient to follow healthy diet, regular exercise regimen, and pursue weight loss to manage symptoms.   -Patient is undergoing potential evaluation for repeat bariatric surgery.  I reviewed with the patient that if his sleep study revealed sleep disordered breathing or sleep apnea at that he would have to be compliant on CPAP therapy prior to undergoing surgery.  Patient verbalized understanding.  Orders:    Ambulatory referral to Sleep Medicine    Split Study; Future

## 2025-01-15 NOTE — ASSESSMENT & PLAN NOTE
Lab Results   Component Value Date    HGBA1C 6.1 (H) 09/19/2024     Patient with history of prediabetes, most recent A1c is 6.1 from 9/19/2024.  There is an association between sleep and glucose control and insulin sensitivity.  I have encouraged patient to aim for at least 7 hours of sleep nightly for overall health and wellness.  Orders:    Split Study; Future

## 2025-01-15 NOTE — ASSESSMENT & PLAN NOTE
We reviewed the association between sleep, mood, and coexisting psychiatric disorders. We discussed the importance of obtaining adequate sleep of at least 7 hours nightly. Patient was encouraged to continue current prescribed medications and to continue follow up with their PCP/psychiatrist for further management.     Orders:    Split Study; Future

## 2025-01-21 NOTE — PROGRESS NOTES
Pre op. Level 3 Revision. Patient had a sleep study scheduled 1/19 and it was canceled due to the snow. Patient upset because it was rescheduled to June. LCSW to reach out to sleep to try to get it moved up. His dad passed away 12/29- had stage 4 lung cancer and didn't tell anyone and found out at his doctor appointment about a week before he passed away- he ended up getting pneumonia. Still not having his meals regularly. Has been caring for his mom. Drinking 32 oz water, 20 oz gatorade, 8-16 oz juice, 10-20 oz coffee. Has been eating late due to not wanting to eat before wrestling practice due to fear of vomiting at practice.  Workflow reviewed:   Psych and/or D+A Clearance: N/A  Support Group: No longer required, strongly encouraged  Surgeon Appt: 12/6/24  Manometry: completed 11/14/24  24Hph: completed 12/16/24  EGD: completed 10/31/24  Cardiac Risk Assessment: scheduled 2/5/25  Sleep Studies: SB done today 6/8- consult completed 1/15/25, sleep study scheduled 6/18- LCSW to reach out to try to move up  Blood work: RD to see what is needed  Nicotine test: N/A  Weight loss meds: N/A  Required weight checks: Patient to follow up monthly until surgery  Weight Loss: Not required, encouraged positive lifestyle changes.  Yolanda Lowry LCSW

## 2025-01-22 ENCOUNTER — CLINICAL SUPPORT (OUTPATIENT)
Dept: BARIATRICS | Facility: CLINIC | Age: 65
End: 2025-01-22

## 2025-01-22 VITALS — BODY MASS INDEX: 54.83 KG/M2 | WEIGHT: 315 LBS

## 2025-01-22 DIAGNOSIS — Z71.89 ENCOUNTER FOR PRE-BARIATRIC SURGERY COUNSELING AND EDUCATION: Primary | ICD-10-CM

## 2025-01-22 PROCEDURE — RECHECK

## 2025-01-28 ENCOUNTER — HOSPITAL ENCOUNTER (OUTPATIENT)
Dept: SLEEP CENTER | Facility: CLINIC | Age: 65
Discharge: HOME | End: 2025-01-28
Payer: COMMERCIAL

## 2025-01-28 DIAGNOSIS — R73.03 PREDIABETES: ICD-10-CM

## 2025-01-28 DIAGNOSIS — F51.01 PRIMARY INSOMNIA: ICD-10-CM

## 2025-01-28 DIAGNOSIS — I10 ESSENTIAL HYPERTENSION: ICD-10-CM

## 2025-01-28 DIAGNOSIS — R06.83 SNORING: ICD-10-CM

## 2025-01-28 DIAGNOSIS — F41.1 GENERALIZED ANXIETY DISORDER: ICD-10-CM

## 2025-01-28 DIAGNOSIS — E66.01 MORBID (SEVERE) OBESITY DUE TO EXCESS CALORIES (HCC): ICD-10-CM

## 2025-01-28 PROCEDURE — 95811 POLYSOM 6/>YRS CPAP 4/> PARM: CPT

## 2025-01-28 PROCEDURE — 95811 POLYSOM 6/>YRS CPAP 4/> PARM: CPT | Performed by: STUDENT IN AN ORGANIZED HEALTH CARE EDUCATION/TRAINING PROGRAM

## 2025-01-29 NOTE — PROGRESS NOTES
Sleep Study Documentation    Pre-Sleep Study       Sleep testing procedure explained to patient:YES    Patient napped prior to study:YES- less than 30 minutes. Napped after 2PM: no    Caffeine:Dayshift worker after 12PM.  Caffeine use:YES- soda  12 ounces    Alcohol:Dayshift workers after 5PM: Alcohol use:NO    Typical day for patient:YES       Study Documentation    Sleep Study Indications: Snoring [R06.83 (ICD-10-CM)]; Primary insomnia [F51.01 (ICD-10-CM)]; Morbid (severe) obesity due to excess calories (HCC) [E66.01 (ICD-10-CM)]; Prediabetes [R73.03 (ICD-10-CM)]; Generalized anxiety disorder [F41.1 (ICD-10-CM)]; Essential hypertension [I10 (ICD-10-CM)]    Sleep Study: Split Optimal PAP pressure: 14 cmH2O  Leak:Small  Snore:Eliminated  REM Obtained:yes  Supplemental O2: no    Minimum SaO2 81%  Baseline SaO2 93%  PAP mask tried (list all) medium Resmed Airtouch full face  PAP mask choice (final) medium Resmed Airtouch full face  PAP mask type:full face  PAP pressure at which snoring was eliminated 14 cmH2O  Minimum SaO2 at final PAP pressure 91%  Mode of Therapy:CPAP  ETCO2:No  CPAP changed to BiPAP:No    EKG abnormalities: no     EEG abnormalities: no    Were abnormal behaviors in sleep observed:NO    Is Total Sleep Study Recording Time < 2 hours: N/A    Is Total Sleep Study Recording Time > 2 hours but study is incomplete: N/A    Is Total Sleep Study Recording Time 6 hours or more but sleep was not obtained: NO    Patient classification:        Post-Sleep Study    Medication used at bedtime or during sleep study:YES prescription sleep aid    Patient reports time it took to fall asleep:greater than 60 minutes    Patient reports waking up during study:3 or more times.  Patient reports returning to sleep in 10 to 30 minutes.    Patient reports sleeping 2 to 4 hours without dreaming.    Does the Patient feel this is a typical night of sleep:worse than usual    Patient rated sleepiness: Not sleepy or tired    PAP  treatment:yes: Post PAP treatment patient reports feeling unchanged and would wear PAP mask at home.

## 2025-01-30 ENCOUNTER — DOCUMENTATION (OUTPATIENT)
Dept: SLEEP CENTER | Facility: CLINIC | Age: 65
End: 2025-01-30

## 2025-01-30 DIAGNOSIS — F51.01 PRIMARY INSOMNIA: ICD-10-CM

## 2025-01-30 DIAGNOSIS — F41.1 GENERALIZED ANXIETY DISORDER: ICD-10-CM

## 2025-01-30 DIAGNOSIS — I10 ESSENTIAL HYPERTENSION: ICD-10-CM

## 2025-01-30 DIAGNOSIS — R73.03 PREDIABETES: ICD-10-CM

## 2025-01-30 DIAGNOSIS — E66.01 MORBID (SEVERE) OBESITY DUE TO EXCESS CALORIES (HCC): ICD-10-CM

## 2025-01-30 DIAGNOSIS — G47.33 OSA (OBSTRUCTIVE SLEEP APNEA): Primary | ICD-10-CM

## 2025-01-30 NOTE — PROGRESS NOTES
Patient with recent split-night sleep study.    Sleep study revealed moderate obstructive sleep apnea with an overall AHI of 21.8 events per hour.    CPAP was titrated from an initial pressure of 4 cm of water to a final pressure of 14 cm of water with resolution of respiratory events.    I have placed an order for auto CPAP 14-20 cm of water with a ResMed AirTouch size medium fullface mask.    I have messaged the clinical sleep pool to contact the patient regarding study results and let him know of plan of care.    Patient will need follow-up with me within 3 months for initial CPAP compliance visit.

## 2025-02-05 ENCOUNTER — OFFICE VISIT (OUTPATIENT)
Dept: CARDIOLOGY CLINIC | Facility: CLINIC | Age: 65
End: 2025-02-05
Payer: COMMERCIAL

## 2025-02-05 ENCOUNTER — HOSPITAL ENCOUNTER (OUTPATIENT)
Dept: NON INVASIVE DIAGNOSTICS | Facility: CLINIC | Age: 65
Discharge: HOME | End: 2025-02-05

## 2025-02-05 VITALS
DIASTOLIC BLOOD PRESSURE: 82 MMHG | WEIGHT: 315 LBS | BODY MASS INDEX: 49.44 KG/M2 | SYSTOLIC BLOOD PRESSURE: 140 MMHG | HEIGHT: 67 IN | HEART RATE: 54 BPM

## 2025-02-05 DIAGNOSIS — I48.91 ATRIAL FIBRILLATION, UNSPECIFIED TYPE (HCC): Primary | ICD-10-CM

## 2025-02-05 DIAGNOSIS — G47.33 OBSTRUCTIVE SLEEP APNEA: ICD-10-CM

## 2025-02-05 DIAGNOSIS — I44.1 MOBITZ (TYPE) I (WENCKEBACH'S) ATRIOVENTRICULAR BLOCK: ICD-10-CM

## 2025-02-05 DIAGNOSIS — Z01.810 PREPROCEDURAL CARDIOVASCULAR EXAMINATION: ICD-10-CM

## 2025-02-05 DIAGNOSIS — I48.91 ATRIAL FIBRILLATION, UNSPECIFIED TYPE (HCC): ICD-10-CM

## 2025-02-05 DIAGNOSIS — Z98.84 BARIATRIC SURGERY STATUS: ICD-10-CM

## 2025-02-05 DIAGNOSIS — I10 ESSENTIAL HYPERTENSION: ICD-10-CM

## 2025-02-05 DIAGNOSIS — Z98.84 S/P LAPAROSCOPIC SLEEVE GASTRECTOMY: ICD-10-CM

## 2025-02-05 DIAGNOSIS — M17.0 PRIMARY OSTEOARTHRITIS OF BOTH KNEES: ICD-10-CM

## 2025-02-05 PROCEDURE — 99204 OFFICE O/P NEW MOD 45 MIN: CPT | Performed by: INTERNAL MEDICINE

## 2025-02-05 PROCEDURE — 93000 ELECTROCARDIOGRAM COMPLETE: CPT | Performed by: INTERNAL MEDICINE

## 2025-02-05 NOTE — PATIENT INSTRUCTIONS
"Patient Education     Atrial fibrillation   The Basics   Written by the doctors and editors at [facility]   What is atrial fibrillation? -- Atrial fibrillation is the most common heart rhythm problem (figure 1). The condition can put you at risk of stroke and other problems, as well as death. Atrial fibrillation is sometimes called \"A-fib.\"  The top 2 chambers of your heart are called the \"atria.\" They pump blood into the larger bottom chambers, which pump blood to your lungs and the rest of your body. In A-fib, your heart beats abnormally and the top chambers stop pumping blood as strongly as normal.  In some people, A-fib never goes away. In others, A-fib can come and go, even with treatment. If you had A-fib in the past, but have a normal heart rhythm now, ask your doctor what you can do to keep A-fib from coming back.  You might be able to lower your chances of having A-fib again if you:   Control your blood pressure   Avoid or limit alcohol   Cut down on caffeine   Get treatment for an overactive thyroid gland   Get regular exercise   Lose weight (if you are overweight)   Reduce stress  What are the symptoms of A-fib? -- Some people with A-fib have no symptoms. When symptoms do happen, they can include:   Feeling as though your heart is racing, skipping beats, or beating out of sync   Mild chest \"tightness\" or pain   Feeling lightheaded, dizzy, or like you might pass out   Having trouble breathing, especially with exercise  Is there a test for A-fib? -- Yes. If your doctor or nurse thinks that you might have A-fib, they will probably do a test called an electrocardiogram (\"ECG\"). It records the electrical activity in your heart.  How is A-fib treated? -- In some cases, A-fib goes away on its own, even without treatment. But many people do need treatment.  Treatment can include 1 or more of the following:   Medicines to control the speed or rhythm of the heartbeat   Medicines to keep clots from forming   " "\"Cardioversion\" - This involves applying an electrical current to the heart to fix its rhythm.   \"Ablation\" - These treatments involve destroying the small part of the heart that is sending abnormal electrical signals. This can be done using heat (\"radiofrequency ablation\") or cold (\"cryoablation\").   Pacemaker - This is a device that is implanted in the body and sends electrical signals to the heart to control the heartbeat.  What will my life be like? -- Most people with A-fib are able to live normal lives. Still, it is important to take the medicines that your doctor prescribes every day. Taking your medicines as directed can help reduce the chances that your A-fib will cause a stroke. It's also a good idea to learn what the signs and symptoms of a stroke are (figure 2).  When should I call the doctor? -- Call for an ambulance (in the [place] and [place], call 9-1-1) if:   You have severe trouble breathing, or you pass out.   You have signs of a heart attack, which might include:   Severe chest pain, pressure, or discomfort with:   Breathing trouble, sweating, upset stomach, or cold and clammy skin   Pain in your arms, back, or jaw   Pain that gets worse with activity like walking up stairs   You have signs of a stroke, which might include:   Numbness or weakness of the face, arm, or leg, especially on 1 side of the body   Confusion, or trouble speaking or understanding   Trouble seeing in 1 or both eyes   Trouble walking, dizziness, or loss of balance or coordination   Severe headache with no known cause  Call your doctor for advice if:   You have trouble breathing when talking or sitting still.   You feel your heart racing, and it does not stop after a while (for example, 1 hour).   You are lightheaded or more tired than normal.  All topics are updated as new evidence becomes available and our peer review process is complete.  This topic retrieved from [employer] on: Feb 26, 2024.  Topic 00791 Version 25.0  Release: " "32.2.4 - C32.56  © 2024 UpToDate, Inc. and/or its affiliates. All rights reserved.  figure 1: Atrial fibrillation     This drawing shows where the heart is located in the chest. In atrial fibrillation (\"A-fib\"), the electrical signals that control the heartbeat are abnormal. As a result, the top 2 chambers of the heart (see arrows) stop pumping effectively, and blood that should move out of these chambers gets left behind.  Graphic 46277 Version 6.0  figure 2: BE FAST to help remember stroke symptoms     One way to help remember stroke symptoms is to think of the words \"BE FAST.\" If a person shows any of these signs, call for an ambulance right away (in the [place] and [place], call 9-1-1).  Graphic 40992 Version 10.0  Consumer Information Use and Disclaimer   Disclaimer: This generalized information is a limited summary of diagnosis, treatment, and/or medication information. It is not meant to be comprehensive and should be used as a tool to help the user understand and/or assess potential diagnostic and treatment options. It does NOT include all information about conditions, treatments, medications, side effects, or risks that may apply to a specific patient. It is not intended to be medical advice or a substitute for the medical advice, diagnosis, or treatment of a health care provider based on the health care provider's examination and assessment of a patient's specific and unique circumstances. Patients must speak with a health care provider for complete information about their health, medical questions, and treatment options, including any risks or benefits regarding use of medications. This information does not endorse any treatments or medications as safe, effective, or approved for treating a specific patient. UpToDate, Inc. and its affiliates disclaim any warranty or liability relating to this information or the use thereof.The use of this information is governed by the Terms of Use, available at " https://www.woltersNirvahauwer.com/en/know/clinical-effectiveness-terms. 2024© [employer] and its affiliates and/or licensors. All rights reserved.  Copyright   © 2024 [employer] and/or its affiliates. All rights reserved.

## 2025-02-05 NOTE — ASSESSMENT & PLAN NOTE
New, asymptomatic, of unknown duration with slow rate on no negative chronotropes with h/o second degree AV block on chart-suggesting SSS.    Will check Holter and Echo  Not routinely achieving 4+ METS, with surgery planned and Afib needs ischemic BOYCE as well-LNST ordered    Would avoid negative chronotropic agents    Briefly discussed he will likely be looking at a PPM in his future, not indicated at this time (several family members have PPMs)    Low to intermediate stroke risk score  Discussed AC and how it appears Asa plays little role in stroke prevention  Low risk (0.6% per year) CVA, mutually decided against full AC, aware he will require it when he turns 66    At his age, and no symptoms, with treated ELIDIA and obesity, favor RC/AC strategy vs rhythm control strategy.  Briefly discussed roles of DCCV and Ablation and that I dont recommend either at this time.

## 2025-02-05 NOTE — ASSESSMENT & PLAN NOTE
See comments under Afib  Clearance pending further testing  Discussed briefly with Dr. Walker and Jyoti via secure chat

## 2025-02-05 NOTE — PROGRESS NOTES
Patient ID: Gibran Calvillo is a 64 y.o. male.        Plan:      Atrial fibrillation (HCC)  New, asymptomatic, of unknown duration with slow rate on no negative chronotropes with h/o second degree AV block on chart-suggesting SSS.    Will check Holter and Echo  Not routinely achieving 4+ METS, with surgery planned and Afib needs ischemic BOYCE as well-LNST ordered    Would avoid negative chronotropic agents    Briefly discussed he will likely be looking at a PPM in his future, not indicated at this time (several family members have PPMs)    Low to intermediate stroke risk score  Discussed AC and how it appears Asa plays little role in stroke prevention  Low risk (0.6% per year) CVA, mutually decided against full AC, aware he will require it when he turns 66    At his age, and no symptoms, with treated ELIDIA and obesity, favor RC/AC strategy vs rhythm control strategy.  Briefly discussed roles of DCCV and Ablation and that I dont recommend either at this time.      Essential hypertension  Per PCP on ACEI    Preprocedural cardiovascular examination  See comments under Afib  Clearance pending further testing  Discussed briefly with Dr. Walker and Jyoti via secure chat       Follow up Plan/Other summary comments:  Return in about 4 weeks (around 3/5/2025).    HPI: Gibran is here for surgical clearance.  Planned-laparoscopic revision with conversion of sleeve gastrectomy to Marshal-en-Y gastric bypass and hiatal hernia repair.  Gibran denies any prior cardiac history.  No MI, CHF, arrhythmia.  No h/o Cath/stent, no h/o rheumatic fever.  Denies any CP SOB concerns, palpitations, (pre)syncope TIA or quyen sx.  No yoko sx on extensive focused ROS questions.  Has a h/o DVT, no h/o PE.  DVT sounds like it was after orthopedic surgery.  No PND nor orthopnea.  On chart it states he has ah/o second degree AV ev Type I.  Gibran is not familiar with this.  He doesn't recall ever seeing a cardiologist before.  He has treated ELIDIA.  No  "known thyroid disease.      Results for orders placed or performed in visit on 02/05/25   POCT ECG    Impression    Afib with slow vent response @ 54 bpm     [id number] score is 1 based on HTN    Most recent or relevant cardiac/vascular testing:    NA      Past Surgical History:   Procedure Laterality Date    ABDOMINAL SURGERY      COLONOSCOPY      GASTRECTOMY SLEEVE LAPAROSCOPIC  2019    JOINT REPLACEMENT Bilateral     KNEE ARTHROSCOPY      Therapeutic; Last Assessed: 10/30/2015    KNEE SURGERY Bilateral 2006, 2008    Knee replacement    MOHS SURGERY      Mohs Micrographic Surg Trunk First Stage, up to 5 Specimens    PROSTATECTOMY      TONSILLECTOMY AND ADENOIDECTOMY      UVULECTOMY      Last Assessed: 10/30/2015       Lipid Profile: Reviewed      Review of Systems   10  point ROS  was otherwise non pertinent or negative except as per HPI or as below.         Objective:     /82   Pulse (!) 54   Ht 5' 7\" (1.702 m)   Wt (!) 160 kg (353 lb)   BMI 55.29 kg/m²     PHYSICAL EXAM:    General:  Normal appearance in no distress.  Eyes:  Anicteric.  Oral mucosa:  Moist.  Neck:  No JVD. Carotid upstrokes are brisk without bruits.  No masses.  Chest:  Clear to auscultation.  Cardiac:  Regular No palpable PMI.  Normal S1 and S2.  No murmur gallop or rub.  Abdomen:  Soft and nontender. No palpable organomegaly or aortic enlargement.  Extremities:  No peripheral edema.  Musculoskeletal:  Symmetric.   Vascular:  Pedal pulses are intact.  Neuro:  Grossly symmetric.  Psych:  Alert and oriented x3.      Meds reviewed.    Current Outpatient Medications:     aspirin (ECOTRIN LOW STRENGTH) 81 mg EC tablet, Take 81 mg by mouth daily, Disp: , Rfl:     escitalopram (LEXAPRO) 20 mg tablet, take 1 tablet by mouth once daily, Disp: 90 tablet, Rfl: 1    lisinopril (ZESTRIL) 10 mg tablet, take 1 tablet by mouth once daily, Disp: 90 tablet, Rfl: 1    Multiple Vitamins-Minerals (multivitamin with minerals) tablet, Take 1 tablet by mouth " daily, Disp: , Rfl:     zolpidem (AMBIEN) 10 mg tablet, Take 1 tablet (10 mg total) by mouth daily at bedtime as needed for sleep, Disp: 30 tablet, Rfl: 2    Calcium Carbonate-Vitamin D 600-400 MG-UNIT per tablet, Take 1 tablet by mouth, Disp: , Rfl:     enoxaparin (LOVENOX) 120 mg/0.8 mL, Inject 0.8 mL (120 mg total) under the skin every 12 (twelve) hours for 3 days, Disp: 4.8 mL, Rfl: 0    ketoconazole (NIZORAL) 2 % shampoo,  , Disp: , Rfl:     methocarbamol (ROBAXIN) 500 mg tablet, take 1 tablet by mouth four times a day, Disp: 90 tablet, Rfl: 0    omeprazole (PriLOSEC) 20 mg delayed release capsule, Take 1 capsule (20 mg total) by mouth daily before breakfast, Disp: 90 capsule, Rfl: 3    ondansetron (ZOFRAN) 4 mg tablet, Take 1 tablet (4 mg total) by mouth every 8 (eight) hours as needed for nausea or vomiting, Disp: 30 tablet, Rfl: 1    rivaroxaban (XARELTO) 15 mg tablet, Take 1 tablet (15 mg total) by mouth 2 (two) times a day for 21 days, Disp: 42 tablet, Rfl: 0    rivaroxaban (XARELTO) 20 mg tablet, Take 1 tablet (20 mg total) by mouth daily with breakfast, Disp: 90 tablet, Rfl: 3    sildenafil (VIAGRA) 100 mg tablet, Take 1 tablet (100 mg total) by mouth daily as needed for erectile dysfunction, Disp: 6 tablet, Rfl: 0    traZODone (DESYREL) 50 mg tablet, take 1 tablet by mouth once daily at bedtime, Disp: 30 tablet, Rfl: 3    zolpidem (AMBIEN) 10 mg tablet, TAKE 1 TABLET BY MOUTH DAILY AT BEDTIME AS NEEDED FOR SLEEP. (Patient not taking: Reported on 2/5/2025), Disp: 90 tablet, Rfl: 0     Past Medical History:   Diagnosis Date    Arthritis     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HCC)     Cancer (HCC)     of back    COVID-19     DVT (deep venous thrombosis) (HCC)     post TKR    Generalized anxiety disorder     Hypertension     Melanoma (HCC)     Mobitz (type) I (Wenckebach's) atrioventricular block     Obesity     Primary osteoarthritis of both knees     Prostate cancer (HCC)     Sleep apnea            Social  History     Tobacco Use   Smoking Status Never    Passive exposure: Never   Smokeless Tobacco Never